# Patient Record
Sex: FEMALE | Race: BLACK OR AFRICAN AMERICAN | NOT HISPANIC OR LATINO | Employment: FULL TIME | URBAN - METROPOLITAN AREA
[De-identification: names, ages, dates, MRNs, and addresses within clinical notes are randomized per-mention and may not be internally consistent; named-entity substitution may affect disease eponyms.]

---

## 2017-01-13 ENCOUNTER — OFFICE VISIT (OUTPATIENT)
Dept: INTERNAL MEDICINE | Facility: CLINIC | Age: 52
End: 2017-01-13
Payer: COMMERCIAL

## 2017-01-13 VITALS
WEIGHT: 181 LBS | OXYGEN SATURATION: 99 % | BODY MASS INDEX: 34.17 KG/M2 | DIASTOLIC BLOOD PRESSURE: 74 MMHG | HEIGHT: 61 IN | SYSTOLIC BLOOD PRESSURE: 144 MMHG | TEMPERATURE: 98 F | HEART RATE: 69 BPM

## 2017-01-13 DIAGNOSIS — E55.9 UNSPECIFIED VITAMIN D DEFICIENCY: ICD-10-CM

## 2017-01-13 DIAGNOSIS — E78.5 HYPERLIPIDEMIA, UNSPECIFIED HYPERLIPIDEMIA TYPE: ICD-10-CM

## 2017-01-13 DIAGNOSIS — E11.9 TYPE 2 DIABETES MELLITUS WITHOUT COMPLICATION, WITHOUT LONG-TERM CURRENT USE OF INSULIN: Primary | Chronic | ICD-10-CM

## 2017-01-13 DIAGNOSIS — I10 ESSENTIAL HYPERTENSION: Chronic | ICD-10-CM

## 2017-01-13 DIAGNOSIS — E66.9 NON MORBID OBESITY, UNSPECIFIED OBESITY TYPE: ICD-10-CM

## 2017-01-13 PROCEDURE — 99214 OFFICE O/P EST MOD 30 MIN: CPT | Mod: S$GLB,,, | Performed by: INTERNAL MEDICINE

## 2017-01-13 PROCEDURE — 4010F ACE/ARB THERAPY RXD/TAKEN: CPT | Mod: S$GLB,,, | Performed by: INTERNAL MEDICINE

## 2017-01-13 PROCEDURE — 2022F DILAT RTA XM EVC RTNOPTHY: CPT | Mod: S$GLB,,, | Performed by: INTERNAL MEDICINE

## 2017-01-13 PROCEDURE — 3078F DIAST BP <80 MM HG: CPT | Mod: S$GLB,,, | Performed by: INTERNAL MEDICINE

## 2017-01-13 PROCEDURE — 3046F HEMOGLOBIN A1C LEVEL >9.0%: CPT | Mod: S$GLB,,, | Performed by: INTERNAL MEDICINE

## 2017-01-13 PROCEDURE — 3074F SYST BP LT 130 MM HG: CPT | Mod: S$GLB,,, | Performed by: INTERNAL MEDICINE

## 2017-01-13 PROCEDURE — 1159F MED LIST DOCD IN RCRD: CPT | Mod: S$GLB,,, | Performed by: INTERNAL MEDICINE

## 2017-01-13 PROCEDURE — 99999 PR PBB SHADOW E&M-EST. PATIENT-LVL III: CPT | Mod: PBBFAC,,, | Performed by: INTERNAL MEDICINE

## 2017-01-13 RX ORDER — METFORMIN HYDROCHLORIDE 500 MG/1
500 TABLET ORAL
Qty: 90 TABLET | Refills: 3 | Status: SHIPPED | OUTPATIENT
Start: 2017-01-13 | End: 2017-10-26 | Stop reason: DRUGHIGH

## 2017-01-13 RX ORDER — LOSARTAN POTASSIUM AND HYDROCHLOROTHIAZIDE 12.5; 1 MG/1; MG/1
1 TABLET ORAL DAILY
Qty: 90 TABLET | Refills: 3 | Status: SHIPPED | OUTPATIENT
Start: 2017-01-13 | End: 2019-02-15 | Stop reason: SDUPTHER

## 2017-01-13 RX ORDER — PRAVASTATIN SODIUM 40 MG/1
40 TABLET ORAL NIGHTLY
Qty: 90 TABLET | Refills: 3 | Status: SHIPPED | OUTPATIENT
Start: 2017-01-13 | End: 2019-02-15 | Stop reason: SDUPTHER

## 2017-01-18 NOTE — PROGRESS NOTES
Subjective:       Patient ID: Sharron Redmond is a 51 y.o. female.    Chief Complaint: Follow-up (4 month DM check up)    HPI   The patient presents for follow-up of hypertension and diabetes therapy.  She has been feeling well.  She relates that her diet is erratic.  Her fasting blood sugars have ranged from 109-115.  She denies experiencing any hypoglycemic episodes.  No lower extremity numbness or tingling is noted.  Her vision is stable.  She has vitamin D deficiency and is using liquid vitamin D3 and a dosage of a drops per day sublingually.  She has hypertension and her blood pressures have ranged from 135-144/84-88.    Review of Systems   Constitutional: Negative for activity change, appetite change and unexpected weight change.   Eyes: Negative for visual disturbance.   Respiratory: Negative for shortness of breath.    Cardiovascular: Negative for chest pain, palpitations and leg swelling.   Gastrointestinal: Negative for abdominal pain, blood in stool and diarrhea.   Genitourinary: Negative for dysuria, frequency, hematuria and urgency.   Neurological: Negative for weakness, numbness and headaches.   Psychiatric/Behavioral: Negative for sleep disturbance.       Objective:      Physical Exam   Constitutional: She is oriented to person, place, and time. She appears well-developed and well-nourished. No distress.   HENT:   Head: Normocephalic and atraumatic.   Eyes: Conjunctivae and EOM are normal. Pupils are equal, round, and reactive to light. No scleral icterus.   Neck: Normal range of motion. Neck supple. No JVD present. No thyromegaly present.   Cardiovascular: Normal rate, regular rhythm, normal heart sounds and intact distal pulses.  Exam reveals no gallop and no friction rub.    No murmur heard.  Pulmonary/Chest: Effort normal and breath sounds normal. No respiratory distress. She has no wheezes. She has no rales.   Abdominal: Soft. Bowel sounds are normal. She exhibits no mass. There is no tenderness.    Musculoskeletal: Normal range of motion. She exhibits no edema or tenderness.   Lymphadenopathy:     She has no cervical adenopathy.   Neurological: She is alert and oriented to person, place, and time. No cranial nerve deficit.   Sensory exam is intact in both feet on monofilament and vibration testing.   Skin: Skin is warm and dry. No rash noted.   No foot lesions are present.   Psychiatric: She has a normal mood and affect. Her behavior is normal.   Nursing note and vitals reviewed.      Results for orders placed or performed in visit on 09/06/16   Comprehensive metabolic panel   Result Value Ref Range    Glucose 119 (H) 65 - 99 mg/dL    BUN, Bld 13 7 - 25 mg/dL    Creatinine 0.75 0.50 - 1.05 mg/dL    eGFR if non  92 > OR = 60 mL/min/1.73m2    eGFR if African American 106 > OR = 60 mL/min/1.73m2    BUN/Creatinine Ratio NOT APPLICABLE 6 - 22 (calc)    Sodium 140 135 - 146 mmol/L    Potassium 4.2 3.5 - 5.3 mmol/L    Chloride 105 98 - 110 mmol/L    CO2 29 20 - 31 mmol/L    Calcium 9.1 8.6 - 10.4 mg/dL    Total Protein 6.6 6.1 - 8.1 g/dL    Albumin 3.7 3.6 - 5.1 g/dL    Globulin, Total 2.9 1.9 - 3.7 g/dL (calc)    Albumin/Globulin Ratio 1.3 1.0 - 2.5 (calc)    Total Bilirubin 0.5 0.2 - 1.2 mg/dL    Alkaline Phosphatase 96 33 - 130 U/L    AST 15 10 - 35 U/L    ALT 13 6 - 29 U/L   Lipid panel   Result Value Ref Range    Cholesterol 217 (H) 125 - 200 mg/dL    HDL 92 > OR = 46 mg/dL    Triglycerides 68 <150 mg/dL    LDL Cholesterol 111 <130 mg/dL (calc)    HDL/Chol Ratio 2.4 < OR = 5.0 (calc)    Non HDL Chol. (LDL+VLDL) 125 mg/dL (calc)   Hemoglobin A1c   Result Value Ref Range    A1c 7.2 (H) <5.7 % of total Hgb       Assessment:       1. Type 2 diabetes mellitus without complication, without long-term current use of insulin    2. Essential hypertension    3. Hyperlipidemia, unspecified hyperlipidemia type    4. Non morbid obesity, unspecified obesity type    5. Unspecified vitamin D deficiency         Plan:           Sharron was seen today for follow-up.  Diet therapy was discussed.  Blood tests and a follow-up visit will be obtained in 4 months.  Current medication will be continued.    Diagnoses and all orders for this visit:    Type 2 diabetes mellitus without complication, without long-term current use of insulin    Essential hypertension    Hyperlipidemia, unspecified hyperlipidemia type    Non morbid obesity, unspecified obesity type    Unspecified vitamin D deficiency  -     Vitamin D; Future    Other orders  -     losartan-hydrochlorothiazide 100-12.5 mg (HYZAAR) 100-12.5 mg Tab; Take 1 tablet by mouth once daily.  -     metformin (GLUCOPHAGE) 500 MG tablet; Take 1 tablet (500 mg total) by mouth before dinner.  -     pravastatin (PRAVACHOL) 40 MG tablet; Take 1 tablet (40 mg total) by mouth nightly. For cholesterol control.

## 2017-05-16 ENCOUNTER — TELEPHONE (OUTPATIENT)
Dept: INTERNAL MEDICINE | Facility: CLINIC | Age: 52
End: 2017-05-16

## 2017-05-16 DIAGNOSIS — E11.9 TYPE 2 DIABETES MELLITUS WITHOUT COMPLICATION, WITHOUT LONG-TERM CURRENT USE OF INSULIN: Primary | Chronic | ICD-10-CM

## 2017-05-16 DIAGNOSIS — E11.9 DIABETES MELLITUS WITHOUT COMPLICATION: Primary | ICD-10-CM

## 2017-05-16 DIAGNOSIS — E78.5 HYPERLIPIDEMIA, UNSPECIFIED HYPERLIPIDEMIA TYPE: ICD-10-CM

## 2017-05-16 DIAGNOSIS — I10 HYPERTENSION, ESSENTIAL: ICD-10-CM

## 2017-05-16 NOTE — TELEPHONE ENCOUNTER
----- Message from Nasreen Fox sent at 5/16/2017  1:21 PM CDT -----  Contact: call pt at [phone]  Doctor appointment and lab have been scheduled.  Please link lab orders to the lab appointment.  Date of doctor appointment:  05/19/2017  Physical or EP:  4 month follow up   Date of lab appointment:  Not scheduled   Comments:patient needs lab orders sent to [facility] in [facility]

## 2017-05-16 NOTE — TELEPHONE ENCOUNTER
----- Message from Nasreen Fox sent at 5/16/2017  1:21 PM CDT -----  Contact: call pt at [phone]  Doctor appointment and lab have been scheduled.  Please link lab orders to the lab appointment.  Date of doctor appointment:  05/19/2017  Physical or EP:  4 month follow up   Date of lab appointment:  Not scheduled   Comments:patient needs lab orders sent to [facility] in [facility]

## 2017-05-18 LAB
ALBUMIN SERPL-MCNC: 3.6 G/DL (ref 3.6–5.1)
ALBUMIN/GLOB SERPL: 1.3 (CALC) (ref 1–2.5)
ALP SERPL-CCNC: 93 U/L (ref 33–130)
ALT SERPL-CCNC: 12 U/L (ref 6–29)
AST SERPL-CCNC: 15 U/L (ref 10–35)
BASOPHILS # BLD AUTO: 18 CELLS/UL (ref 0–200)
BASOPHILS NFR BLD AUTO: 0.4 %
BILIRUB SERPL-MCNC: 0.4 MG/DL (ref 0.2–1.2)
BUN SERPL-MCNC: 11 MG/DL (ref 7–25)
BUN/CREAT SERPL: ABNORMAL (CALC) (ref 6–22)
CALCIUM SERPL-MCNC: 8.8 MG/DL (ref 8.6–10.4)
CHLORIDE SERPL-SCNC: 107 MMOL/L (ref 98–110)
CO2 SERPL-SCNC: 29 MMOL/L (ref 20–31)
CREAT SERPL-MCNC: 0.83 MG/DL (ref 0.5–1.05)
EOSINOPHIL # BLD AUTO: 162 CELLS/UL (ref 15–500)
EOSINOPHIL NFR BLD AUTO: 3.6 %
ERYTHROCYTE [DISTWIDTH] IN BLOOD BY AUTOMATED COUNT: 16.7 % (ref 11–15)
GFR SERPL CREATININE-BSD FRML MDRD: 81 ML/MIN/1.73M2
GLOBULIN SER CALC-MCNC: 2.8 G/DL (CALC) (ref 1.9–3.7)
GLUCOSE SERPL-MCNC: 102 MG/DL (ref 65–99)
HBA1C MFR BLD: 6.5 % OF TOTAL HGB
HCT VFR BLD AUTO: 35.2 % (ref 35–45)
HGB BLD-MCNC: 11.3 G/DL (ref 11.7–15.5)
LYMPHOCYTES # BLD AUTO: 2453 CELLS/UL (ref 850–3900)
LYMPHOCYTES NFR BLD AUTO: 54.5 %
MCH RBC QN AUTO: 26.4 PG (ref 27–33)
MCHC RBC AUTO-ENTMCNC: 32 G/DL (ref 32–36)
MCV RBC AUTO: 82.5 FL (ref 80–100)
MONOCYTES # BLD AUTO: 284 CELLS/UL (ref 200–950)
MONOCYTES NFR BLD AUTO: 6.3 %
NEUTROPHILS # BLD AUTO: 1584 CELLS/UL (ref 1500–7800)
NEUTROPHILS NFR BLD AUTO: 35.2 %
PLATELET # BLD AUTO: 297 THOUSAND/UL (ref 140–400)
PMV BLD REES-ECKER: 8.6 FL (ref 7.5–12.5)
POTASSIUM SERPL-SCNC: 3.9 MMOL/L (ref 3.5–5.3)
PROT SERPL-MCNC: 6.4 G/DL (ref 6.1–8.1)
RBC # BLD AUTO: 4.27 MILLION/UL (ref 3.8–5.1)
SODIUM SERPL-SCNC: 141 MMOL/L (ref 135–146)
WBC # BLD AUTO: 4.5 THOUSAND/UL (ref 3.8–10.8)

## 2017-05-19 ENCOUNTER — OFFICE VISIT (OUTPATIENT)
Dept: INTERNAL MEDICINE | Facility: CLINIC | Age: 52
End: 2017-05-19
Payer: COMMERCIAL

## 2017-05-19 VITALS
WEIGHT: 174.38 LBS | SYSTOLIC BLOOD PRESSURE: 132 MMHG | TEMPERATURE: 99 F | DIASTOLIC BLOOD PRESSURE: 78 MMHG | BODY MASS INDEX: 32.95 KG/M2 | HEART RATE: 60 BPM | RESPIRATION RATE: 16 BRPM

## 2017-05-19 DIAGNOSIS — E78.5 HYPERLIPIDEMIA, UNSPECIFIED HYPERLIPIDEMIA TYPE: ICD-10-CM

## 2017-05-19 DIAGNOSIS — I10 ESSENTIAL HYPERTENSION: Chronic | ICD-10-CM

## 2017-05-19 DIAGNOSIS — E11.9 TYPE 2 DIABETES MELLITUS WITHOUT COMPLICATION, WITHOUT LONG-TERM CURRENT USE OF INSULIN: Primary | Chronic | ICD-10-CM

## 2017-05-19 DIAGNOSIS — E66.9 NON MORBID OBESITY, UNSPECIFIED OBESITY TYPE: ICD-10-CM

## 2017-05-19 PROCEDURE — 99214 OFFICE O/P EST MOD 30 MIN: CPT | Mod: 25,S$GLB,, | Performed by: INTERNAL MEDICINE

## 2017-05-19 PROCEDURE — 3044F HG A1C LEVEL LT 7.0%: CPT | Mod: S$GLB,,, | Performed by: INTERNAL MEDICINE

## 2017-05-19 PROCEDURE — 99999 PR PBB SHADOW E&M-EST. PATIENT-LVL III: CPT | Mod: PBBFAC,,, | Performed by: INTERNAL MEDICINE

## 2017-05-19 PROCEDURE — 4010F ACE/ARB THERAPY RXD/TAKEN: CPT | Mod: S$GLB,,, | Performed by: INTERNAL MEDICINE

## 2017-05-19 NOTE — PROGRESS NOTES
Subjective:       Patient ID: Sharron Redmond is a 51 y.o. female.    Chief Complaint: Follow-up and Diabetes    HPI   Patient presents for follow-up of medical conditions.  She is exercising at least 3 days a week.  Her blood sugars have been stable.  Her vision is good.  She has not experienced any tingling or numbness in the lower extremities.  The patient also has hypertension and hyperlipidemia.  She is tolerating her medications well without experiencing any side effects.  Review of Systems   Constitutional: Negative for activity change, appetite change and unexpected weight change.   Eyes: Negative for visual disturbance.   Respiratory: Negative for shortness of breath.    Cardiovascular: Negative for chest pain, palpitations and leg swelling.   Gastrointestinal: Negative for abdominal pain, blood in stool and diarrhea.   Genitourinary: Negative for dysuria, frequency, hematuria and urgency.   Musculoskeletal: Positive for arthralgias (knee joint pain noted.).   Neurological: Negative for weakness, numbness and headaches.   Psychiatric/Behavioral: Negative for sleep disturbance.       Objective:      Physical Exam   Constitutional: She is oriented to person, place, and time. She appears well-developed and well-nourished. No distress.   HENT:   Head: Normocephalic and atraumatic.   Eyes: Conjunctivae and EOM are normal. Pupils are equal, round, and reactive to light. No scleral icterus.   Neck: Normal range of motion. Neck supple. No JVD present. No thyromegaly present.   Cardiovascular: Normal rate, regular rhythm, normal heart sounds and intact distal pulses.  Exam reveals no gallop and no friction rub.    No murmur heard.  Pulmonary/Chest: Effort normal and breath sounds normal. No respiratory distress. She has no wheezes. She has no rales.   Abdominal: Soft. Bowel sounds are normal. She exhibits no mass. There is no tenderness.   Musculoskeletal: Normal range of motion. She exhibits no edema or tenderness.    Lymphadenopathy:     She has no cervical adenopathy.   Neurological: She is alert and oriented to person, place, and time. No cranial nerve deficit.   Sensory exam is intact in both feet on monofilament and vibration testing.   Skin: Skin is warm and dry. No rash noted.   No foot lesions are present.   Psychiatric: She has a normal mood and affect. Her behavior is normal.   Nursing note and vitals reviewed.      Results for orders placed or performed in visit on 05/16/17   Comprehensive metabolic panel   Result Value Ref Range    Glucose 102 (H) 65 - 99 mg/dL    BUN, Bld 11 7 - 25 mg/dL    Creatinine 0.83 0.50 - 1.05 mg/dL    eGFR if non  81 > OR = 60 mL/min/1.73m2    eGFR if African American 94 > OR = 60 mL/min/1.73m2    BUN/Creatinine Ratio NOT APPLICABLE 6 - 22 (calc)    Sodium 141 135 - 146 mmol/L    Potassium 3.9 3.5 - 5.3 mmol/L    Chloride 107 98 - 110 mmol/L    CO2 29 20 - 31 mmol/L    Calcium 8.8 8.6 - 10.4 mg/dL    Total Protein 6.4 6.1 - 8.1 g/dL    Albumin 3.6 3.6 - 5.1 g/dL    Globulin, Total 2.8 1.9 - 3.7 g/dL (calc)    Albumin/Globulin Ratio 1.3 1.0 - 2.5 (calc)    Total Bilirubin 0.4 0.2 - 1.2 mg/dL    Alkaline Phosphatase 93 33 - 130 U/L    AST 15 10 - 35 U/L    ALT 12 6 - 29 U/L   Hemoglobin A1c   Result Value Ref Range    A1c 6.5 (H) <5.7 % of total Hgb   CBC auto differential   Result Value Ref Range    WBC 4.5 3.8 - 10.8 Thousand/uL    RBC 4.27 3.80 - 5.10 Million/uL    Hemoglobin 11.3 (L) 11.7 - 15.5 g/dL    Hematocrit 35.2 35.0 - 45.0 %    MCV 82.5 80.0 - 100.0 fL    MCH 26.4 (L) 27.0 - 33.0 pg    MCHC 32.0 32.0 - 36.0 g/dL    RDW 16.7 (H) 11.0 - 15.0 %    Platelets 297 140 - 400 Thousand/uL    MPV 8.6 7.5 - 12.5 fL    Neutrophils Absolute 1,584 1,500 - 7,800 cells/uL    Lymph # 2,453 850 - 3,900 cells/uL    Mono # 284 200 - 950 cells/uL    Eos # 162 15 - 500 cells/uL    Baso # 18 0 - 200 cells/uL    Neutrophils Relative 35.2 %    Lymph% 54.5 %    Mono% 6.3 %    Eosinophil%  3.6 %    Basophil% 0.4 %       Assessment:       1. Type 2 diabetes mellitus without complication, without long-term current use of insulin    2. Essential hypertension    3. Hyperlipidemia, unspecified hyperlipidemia type    4. Non morbid obesity, unspecified obesity type        Plan:       Sharron was seen today for follow-up and diabetes.  The patient has been encouraged to lose weight.  Laboratory studies will be repeated in 4 months at [facility] as requested.  The patient is to return to clinic in 4 months.  Current medications will be continued.    Diagnoses and all orders for this visit:    Type 2 diabetes mellitus without complication, without long-term current use of insulin    Essential hypertension    Hyperlipidemia, unspecified hyperlipidemia type    Non morbid obesity, unspecified obesity type

## 2017-10-15 ENCOUNTER — HOSPITAL ENCOUNTER (EMERGENCY)
Facility: HOSPITAL | Age: 52
Discharge: HOME | End: 2017-10-16
Attending: EMERGENCY MEDICINE
Payer: COMMERCIAL

## 2017-10-15 DIAGNOSIS — M79.606 LEG PAIN: ICD-10-CM

## 2017-10-15 DIAGNOSIS — M54.30 SCIATICA, UNSPECIFIED LATERALITY: Primary | ICD-10-CM

## 2017-10-15 PROCEDURE — 99284 EMERGENCY DEPT VISIT MOD MDM: CPT

## 2017-10-16 VITALS
SYSTOLIC BLOOD PRESSURE: 172 MMHG | DIASTOLIC BLOOD PRESSURE: 72 MMHG | BODY MASS INDEX: 33.13 KG/M2 | RESPIRATION RATE: 18 BRPM | OXYGEN SATURATION: 100 % | WEIGHT: 180 LBS | TEMPERATURE: 99 F | HEART RATE: 72 BPM | HEIGHT: 62 IN

## 2017-10-16 RX ORDER — PREDNISONE 20 MG/1
40 TABLET ORAL DAILY
Qty: 10 TABLET | Refills: 0 | Status: SHIPPED | OUTPATIENT
Start: 2017-10-16 | End: 2017-10-21

## 2017-10-16 NOTE — ED TRIAGE NOTES
Pt states tripped and fell one month ago has suffered with mild pain to the right leg, tripped and fell again recently and now the pain in her right leg is worse, reports a nerve pain radiating down her right leg

## 2017-10-16 NOTE — ED PROVIDER NOTES
"Encounter Date: 10/15/2017    SCRIBE #1 NOTE: I, Yannick Jacobochrystal, am scribing for, and in the presence of, María Villarrela NP. Other sections scribed: HPI, ROS.       History     Chief Complaint   Patient presents with    Leg Pain     fall in August 2017; lower back pain and pain radiates down right leg; in past week, worse now     CC: Back Pain, Leg Pain  HPI: This 52 y.o. obese female with Hx of HTN, HLD, DM II, gastric bypass, SUKUMAR BSO presents to the ED c/o R low back pain radiating down her R leg for the past 2-2.5 months. Pt states her pain is severe, intermittent, "achy", and seems to be worse at night. Pt states that she tripped, fell, and hurt her R knee a few weeks prior to onset of the rest of these pains. She reports limited relief with Tylenol. She denies numbness, weakness, leg swelling, fever, chest pain, SOB.      The history is provided by the patient.     Review of patient's allergies indicates:   Allergen Reactions    Metformin      Makes pt. Sleepy can not function     Past Medical History:   Diagnosis Date    Diabetes mellitus, type 2     Hyperlipidemia     Hypertension     Obesity      Past Surgical History:   Procedure Laterality Date    gastric bypass surgery  10/2007    HYSTERECTOMY      TAHBSO for ovarian tumors (benign)     Family History   Problem Relation Age of Onset    Hypertension Mother     Diabetes Father     Hypertension Father     Heart disease Father      CHF    Cancer Sister      kidney CA    Cancer Maternal Grandmother      breast and ovarian     Social History   Substance Use Topics    Smoking status: Never Smoker    Smokeless tobacco: Never Used    Alcohol use No     Review of Systems   Constitutional: Negative for chills and fever.   HENT: Negative for ear pain, rhinorrhea and sore throat.    Eyes: Negative for pain and visual disturbance.   Respiratory: Negative for cough and shortness of breath.    Cardiovascular: Negative for chest pain.   Gastrointestinal: " "Negative for abdominal pain, diarrhea, nausea and vomiting.   Genitourinary: Negative for dysuria and flank pain.   Musculoskeletal: Positive for arthralgias, back pain and myalgias.   Skin: Negative for rash and wound.   Neurological: Negative for weakness, numbness and headaches.       Physical Exam     Initial Vitals [10/15/17 2047]   BP Pulse Resp Temp SpO2   (!) 186/79 75 16 98.6 °F (37 °C) 100 %      MAP       114.67         Physical Exam    Nursing note and vitals reviewed.  Constitutional: She appears well-developed and well-nourished.   HENT:   Head: Normocephalic.   Eyes: Conjunctivae are normal.   Neck: Normal range of motion. Neck supple.   Musculoskeletal: Normal range of motion. She exhibits no edema or tenderness.   Neurological: She is alert and oriented to person, place, and time. She has normal strength and normal reflexes. She displays normal reflexes. No cranial nerve deficit.   Skin: Skin is warm and dry. Capillary refill takes less than 2 seconds.         ED Course   Procedures  Labs Reviewed - No data to display          Medical Decision Making:   Initial Assessment:   52-year-old female presents with right leg pain that she describes as a "dull constant ache"  Differential Diagnosis:   DVT  Radiculopathy  Sciatica  ED Management:  Diagnosis management comments: This is an urgent evaluation of a 52-year-old female that presented to the ER with c/o right leg pain. Pts exam was as above.     Ultrasound were reviewed and discussed with pt and     Based on exam today - I have low suspicion for medical, surgical or other life threatening condition.  The patient most likely has sciatica based on the distribution of her pain and the fact she is tender over the sciatic notch.  Patient is diabetic and states that her sugars are very well controlled.  I have given her a course of prednisone with the instructions to stop taking it if her blood sugars go over 200.  I believe pt is safe for " discharge and outpatient f/u.    Pt, and , verbalizes understanding of d/c instructions and will return for worsening condition.    Case discussed with attending who agrees with assessment and plan.               Scribe Attestation:   Scribe #1: I performed the above scribed service and the documentation accurately describes the services I performed. I attest to the accuracy of the note.    Attending Attestation:           Physician Attestation for Scribe:  Physician Attestation Statement for Scribe #1: I, María Velasquez NP, reviewed documentation, as scribed by Yannick Damian in my presence, and it is both accurate and complete.                 ED Course      Clinical Impression:   The primary encounter diagnosis was Sciatica, unspecified laterality. A diagnosis of Leg pain was also pertinent to this visit.    Disposition:   Disposition: Discharged  Condition: Stable                        María Villarreal NP  10/16/17 0127

## 2017-10-19 LAB
ALBUMIN SERPL-MCNC: 3.9 G/DL (ref 3.6–5.1)
ALBUMIN/GLOB SERPL: 1.2 (CALC) (ref 1–2.5)
ALP SERPL-CCNC: 104 U/L (ref 33–130)
ALT SERPL-CCNC: 11 U/L (ref 6–29)
AST SERPL-CCNC: 14 U/L (ref 10–35)
BILIRUB SERPL-MCNC: 0.5 MG/DL (ref 0.2–1.2)
BUN SERPL-MCNC: 14 MG/DL (ref 7–25)
BUN/CREAT SERPL: ABNORMAL (CALC) (ref 6–22)
CALCIUM SERPL-MCNC: 9.6 MG/DL (ref 8.6–10.4)
CHLORIDE SERPL-SCNC: 103 MMOL/L (ref 98–110)
CHOLEST SERPL-MCNC: 239 MG/DL
CHOLEST/HDLC SERPL: 2.5 (CALC)
CO2 SERPL-SCNC: 28 MMOL/L (ref 20–31)
CREAT SERPL-MCNC: 0.79 MG/DL (ref 0.5–1.05)
GFR SERPL CREATININE-BSD FRML MDRD: 85 ML/MIN/1.73M2
GLOBULIN SER CALC-MCNC: 3.2 G/DL (CALC) (ref 1.9–3.7)
GLUCOSE SERPL-MCNC: 130 MG/DL (ref 65–99)
HBA1C MFR BLD: 7.8 % OF TOTAL HGB
HDLC SERPL-MCNC: 97 MG/DL
LDLC SERPL CALC-MCNC: 120 MG/DL (CALC)
NONHDLC SERPL-MCNC: 142 MG/DL (CALC)
POTASSIUM SERPL-SCNC: 4.1 MMOL/L (ref 3.5–5.3)
PROT SERPL-MCNC: 7.1 G/DL (ref 6.1–8.1)
SODIUM SERPL-SCNC: 142 MMOL/L (ref 135–146)
TRIGL SERPL-MCNC: 111 MG/DL

## 2017-10-26 ENCOUNTER — OFFICE VISIT (OUTPATIENT)
Dept: INTERNAL MEDICINE | Facility: CLINIC | Age: 52
End: 2017-10-26
Payer: COMMERCIAL

## 2017-10-26 ENCOUNTER — TELEPHONE (OUTPATIENT)
Dept: INTERNAL MEDICINE | Facility: CLINIC | Age: 52
End: 2017-10-26

## 2017-10-26 ENCOUNTER — HOSPITAL ENCOUNTER (OUTPATIENT)
Dept: RADIOLOGY | Facility: HOSPITAL | Age: 52
Discharge: HOME | End: 2017-10-26
Attending: INTERNAL MEDICINE
Payer: COMMERCIAL

## 2017-10-26 VITALS
WEIGHT: 190.94 LBS | DIASTOLIC BLOOD PRESSURE: 90 MMHG | TEMPERATURE: 100 F | SYSTOLIC BLOOD PRESSURE: 142 MMHG | HEART RATE: 92 BPM | BODY MASS INDEX: 34.92 KG/M2 | RESPIRATION RATE: 16 BRPM

## 2017-10-26 DIAGNOSIS — S89.91XS KNEE INJURY, RIGHT, SEQUELA: ICD-10-CM

## 2017-10-26 DIAGNOSIS — E78.5 HYPERLIPIDEMIA, UNSPECIFIED HYPERLIPIDEMIA TYPE: ICD-10-CM

## 2017-10-26 DIAGNOSIS — I10 ESSENTIAL HYPERTENSION: Chronic | ICD-10-CM

## 2017-10-26 DIAGNOSIS — E11.9 TYPE 2 DIABETES MELLITUS WITHOUT COMPLICATION, WITHOUT LONG-TERM CURRENT USE OF INSULIN: Chronic | ICD-10-CM

## 2017-10-26 DIAGNOSIS — M54.31 SCIATICA OF RIGHT SIDE: Primary | ICD-10-CM

## 2017-10-26 DIAGNOSIS — M54.31 SCIATICA OF RIGHT SIDE: ICD-10-CM

## 2017-10-26 PROCEDURE — 72100 X-RAY EXAM L-S SPINE 2/3 VWS: CPT | Mod: TC,PO

## 2017-10-26 PROCEDURE — 73562 X-RAY EXAM OF KNEE 3: CPT | Mod: TC,PO,RT

## 2017-10-26 PROCEDURE — 72100 X-RAY EXAM L-S SPINE 2/3 VWS: CPT | Mod: 26,,, | Performed by: RADIOLOGY

## 2017-10-26 PROCEDURE — 99999 PR PBB SHADOW E&M-EST. PATIENT-LVL IV: CPT | Mod: PBBFAC,,, | Performed by: INTERNAL MEDICINE

## 2017-10-26 PROCEDURE — 99214 OFFICE O/P EST MOD 30 MIN: CPT | Mod: S$GLB,,, | Performed by: INTERNAL MEDICINE

## 2017-10-26 PROCEDURE — 73562 X-RAY EXAM OF KNEE 3: CPT | Mod: 26,RT,, | Performed by: RADIOLOGY

## 2017-10-26 RX ORDER — NITROFURANTOIN 25; 75 MG/1; MG/1
100 CAPSULE ORAL 2 TIMES DAILY
Qty: 14 CAPSULE | Refills: 0 | Status: SHIPPED | OUTPATIENT
Start: 2017-10-26 | End: 2017-11-08

## 2017-10-26 RX ORDER — METFORMIN HYDROCHLORIDE 750 MG/1
750 TABLET, EXTENDED RELEASE ORAL
Qty: 30 TABLET | Refills: 11 | Status: SHIPPED | OUTPATIENT
Start: 2017-10-26 | End: 2019-02-15 | Stop reason: SDUPTHER

## 2017-10-26 RX ORDER — METHOCARBAMOL 750 MG/1
750 TABLET, FILM COATED ORAL EVERY 8 HOURS PRN
Qty: 60 TABLET | Refills: 3 | Status: SHIPPED | OUTPATIENT
Start: 2017-10-26 | End: 2017-11-05

## 2017-10-26 RX ORDER — MELOXICAM 15 MG/1
15 TABLET ORAL DAILY
Qty: 30 TABLET | Refills: 2 | Status: SHIPPED | OUTPATIENT
Start: 2017-10-26 | End: 2017-11-25

## 2017-10-26 NOTE — TELEPHONE ENCOUNTER
Pt states that following her OV she noted a slight burning with urination.    She is requesting that medication is sent to her local pharmacy for UTI.    Thanks.

## 2017-10-26 NOTE — PROGRESS NOTES
Subjective:       Patient ID: Sharron Shafer is a 52 y.o. female.    Chief Complaint: Follow-up; Diabetes; Hypertension; and Sciatica    HPI   The patient presents for follow-up of persistent symptoms of right sided sciatica in addition to chronic medical conditions.  The patient states she has experienced several falls related to the onset of her sciatica.  She fell at work in August 2017 hurting her right knee.  She fell again in her living room in September 2017 again hurting the right knee and also initiating symptoms of right lower back pain.  The right lumbar pain radiates into the right buttock, hip, leg and into the right foot.  She denies having any tingling or numbness.  She has had intermittent weakness in the right leg.  Earlier this month on getting out of bed she fell onto her right side hitting her head in the process.  This occurred when her right knee gave way while she was getting out of the patient.  She states that this fall also exacerbated her back pain.  There was no loss of consciousness.  She was evaluated in the emergency room on 10/15/17.  Prednisone was prescribed after she was diagnosed to have sciatica.  A venous ultrasound was negative for evidence of DVT in the right lower extremity.  She has noted some improvement in her lumbar radicular pain since completing 5 days of the prednisone.  She did note a temporary elevation in her blood sugar levels from the steroid medication.    She reports blood sugar levels have been improving since she discontinued prednisone therapy.  Her vision is stable.  No lower extremity paresthesias are noted.  She is tolerating her blood pressure medication well.  She has noted unspecified weight gain over the past 3 months.  She admits to being noncompliant with diet since her marriage to a  in July 2017.  Review of Systems   Constitutional: Negative for activity change, appetite change, fatigue and unexpected weight change.   Eyes: Negative for  visual disturbance.   Respiratory: Negative for shortness of breath.    Cardiovascular: Negative for chest pain, palpitations and leg swelling.   Gastrointestinal: Negative for abdominal pain, blood in stool and diarrhea.   Endocrine: Positive for polydipsia. Negative for polyphagia and polyuria.   Genitourinary: Negative for dysuria, frequency, hematuria and urgency.   Musculoskeletal: Positive for arthralgias, back pain and joint swelling.   Skin: Negative for pallor.   Neurological: Negative for dizziness, tremors, seizures, speech difficulty, weakness, numbness and headaches.   Psychiatric/Behavioral: Negative for confusion and sleep disturbance. The patient is not nervous/anxious.        Objective:      Physical Exam   Constitutional: She is oriented to person, place, and time. She appears well-developed and well-nourished. No distress.   HENT:   Head: Normocephalic and atraumatic.   Eyes: Conjunctivae and EOM are normal. Pupils are equal, round, and reactive to light. No scleral icterus.   Neck: Normal range of motion. Neck supple. No JVD present. No thyromegaly present.   Cardiovascular: Normal rate, regular rhythm, normal heart sounds and intact distal pulses.  Exam reveals no gallop and no friction rub.    No murmur heard.  Pulmonary/Chest: Effort normal and breath sounds normal. No respiratory distress. She has no wheezes. She has no rales.   Abdominal: Soft. Bowel sounds are normal. She exhibits no mass. There is no tenderness.   Musculoskeletal: She exhibits tenderness.   The right knee is tender along the medial and lateral aspects of the joint on palpation.  The knee is slightly swollen.  Tenderness is present on range of motion testing.  Decreased flexion of the knee is noted.  Left knee range of motion is intact.  Hip range of motion is intact although abduction and external rotation of the right hip exacerbates her right lumbar pain.  There is right sciatic notch tenderness and right SI joint  tenderness present on palpation.  Positive straight leg raising test on the right at 60°; negative straight leg raising test on the left.  Anterior flexion of the spine to 90°; decreased posterior extension noted on testing.   Lymphadenopathy:     She has no cervical adenopathy.   Neurological: She is alert and oriented to person, place, and time. No cranial nerve deficit. Coordination normal.   Sensory exam is intact in both feet on monofilament and vibration testing.  A slight limp is noted on the right side during ambulation.  Lower extremity strength is 5/5 in the left lower extremity and 3+/5 in the right lower extremity on testing.   Skin: Skin is warm and dry. No rash noted.   No foot lesions are present.   Psychiatric: She has a normal mood and affect. Her behavior is normal. Thought content normal.   Nursing note and vitals reviewed.      Results for orders placed or performed in visit on 05/19/17   Comprehensive metabolic panel   Result Value Ref Range    Glucose 130 (H) 65 - 99 mg/dL    BUN, Bld 14 7 - 25 mg/dL    Creatinine 0.79 0.50 - 1.05 mg/dL    eGFR if non  85 > OR = 60 mL/min/1.73m2    eGFR if African American 99 > OR = 60 mL/min/1.73m2    BUN/Creatinine Ratio NOT APPLICABLE 6 - 22 (calc)    Sodium 142 135 - 146 mmol/L    Potassium 4.1 3.5 - 5.3 mmol/L    Chloride 103 98 - 110 mmol/L    CO2 28 20 - 31 mmol/L    Calcium 9.6 8.6 - 10.4 mg/dL    Total Protein 7.1 6.1 - 8.1 g/dL    Albumin 3.9 3.6 - 5.1 g/dL    Globulin, Total 3.2 1.9 - 3.7 g/dL (calc)    Albumin/Globulin Ratio 1.2 1.0 - 2.5 (calc)    Total Bilirubin 0.5 0.2 - 1.2 mg/dL    Alkaline Phosphatase 104 33 - 130 U/L    AST 14 10 - 35 U/L    ALT 11 6 - 29 U/L   Lipid panel   Result Value Ref Range    Cholesterol 239 (H) <200 mg/dL    HDL 97 >50 mg/dL    Triglycerides 111 <150 mg/dL    LDL Cholesterol 120 (H) mg/dL (calc)    HDL/Chol Ratio 2.5 <5.0 (calc)    Non HDL Chol. (LDL+VLDL) 142 (H) <130 mg/dL (calc)   Hemoglobin A1c    Result Value Ref Range    A1c 7.8 (H) <5.7 % of total Hgb       Assessment:       1. Sciatica of right side    2. Knee injury, right, sequela    3. Type 2 diabetes mellitus without complication, without long-term current use of insulin    4. Essential hypertension    5. Hyperlipidemia, unspecified hyperlipidemia type        Plan:         Sharron was seen today for follow-up, diabetes, hypertension and sciatica.  The dose of metformin will be changed to extended-release metformin 750 mg once daily.  Meloxicam and Robaxin will be ordered for treatment of back pain.  X-rays of the right knee will be obtained in view of persistent knee swelling and pain since her initial fall.  X-rays of the lumbar spine will be obtained.  An MRI scan of the lumbar spine has also been ordered in view of the history of multiple falls and persistent lumbar radicular symptoms.  Patient will also be referred to [facility] for follow-up evaluation for additional treatment.  Patient will see me again in one month.    Diagnoses and all orders for this visit:    Sciatica of right side  -     X-Ray Lumbar Spine Ap And Lateral; Future  -     MRI Lumbar Spine Without Contrast; Future  -     Ambulatory Consult to [facility]    Knee injury, right, sequela  -     X-Ray Knee 3 View Right; Future    Type 2 diabetes mellitus without complication, without long-term current use of insulin    Essential hypertension    Hyperlipidemia, unspecified hyperlipidemia type    Other orders  -     meloxicam (MOBIC) 15 MG tablet; Take 1 tablet (15 mg total) by mouth once daily.  -     methocarbamol (ROBAXIN) 750 MG Tab; Take 1 tablet (750 mg total) by mouth every 8 (eight) hours as needed (muscle spasm and pain).  -     metFORMIN (GLUCOPHAGE-XR) 750 MG 24 hr tablet; Take 1 tablet (750 mg total) by mouth daily with breakfast.

## 2017-10-27 NOTE — TELEPHONE ENCOUNTER
A prescription for Macrobid has been sent to the patient's pharmacy. (Routing comment)     Noted that MD sent in the above rx.

## 2017-10-31 ENCOUNTER — HOSPITAL ENCOUNTER (OUTPATIENT)
Dept: RADIOLOGY | Facility: HOSPITAL | Age: 52
Discharge: HOME | End: 2017-10-31
Attending: INTERNAL MEDICINE
Payer: COMMERCIAL

## 2017-10-31 DIAGNOSIS — M54.31 SCIATICA OF RIGHT SIDE: ICD-10-CM

## 2017-10-31 PROCEDURE — 72148 MRI LUMBAR SPINE W/O DYE: CPT | Mod: TC

## 2017-10-31 PROCEDURE — 72148 MRI LUMBAR SPINE W/O DYE: CPT | Mod: 26,,, | Performed by: RADIOLOGY

## 2017-11-01 ENCOUNTER — TELEPHONE (OUTPATIENT)
Dept: SPINE | Facility: CLINIC | Age: 52
End: 2017-11-01

## 2017-11-01 DIAGNOSIS — M54.50 LOW BACK PAIN WITHOUT SCIATICA, UNSPECIFIED BACK PAIN LATERALITY, UNSPECIFIED CHRONICITY: Primary | ICD-10-CM

## 2017-11-08 ENCOUNTER — HOSPITAL ENCOUNTER (OUTPATIENT)
Dept: RADIOLOGY | Facility: OTHER | Age: 52
Discharge: HOME | End: 2017-11-08
Attending: PHYSICIAN ASSISTANT
Payer: COMMERCIAL

## 2017-11-08 ENCOUNTER — OFFICE VISIT (OUTPATIENT)
Dept: SPINE | Facility: CLINIC | Age: 52
End: 2017-11-08
Payer: COMMERCIAL

## 2017-11-08 VITALS
HEIGHT: 62 IN | DIASTOLIC BLOOD PRESSURE: 70 MMHG | WEIGHT: 190 LBS | BODY MASS INDEX: 34.96 KG/M2 | HEART RATE: 71 BPM | SYSTOLIC BLOOD PRESSURE: 142 MMHG

## 2017-11-08 DIAGNOSIS — M43.10 ACQUIRED SPONDYLOLISTHESIS: ICD-10-CM

## 2017-11-08 DIAGNOSIS — M54.17 THORACIC AND LUMBOSACRAL NEURITIS: ICD-10-CM

## 2017-11-08 DIAGNOSIS — M47.819 SPONDYLOSIS WITHOUT MYELOPATHY: ICD-10-CM

## 2017-11-08 DIAGNOSIS — M54.41 ACUTE RIGHT-SIDED LOW BACK PAIN WITH RIGHT-SIDED SCIATICA: ICD-10-CM

## 2017-11-08 DIAGNOSIS — M54.50 LOW BACK PAIN WITHOUT SCIATICA, UNSPECIFIED BACK PAIN LATERALITY, UNSPECIFIED CHRONICITY: ICD-10-CM

## 2017-11-08 DIAGNOSIS — M54.14 THORACIC AND LUMBOSACRAL NEURITIS: ICD-10-CM

## 2017-11-08 DIAGNOSIS — M51.37 DDD (DEGENERATIVE DISC DISEASE), LUMBOSACRAL: ICD-10-CM

## 2017-11-08 PROCEDURE — 72100 X-RAY EXAM L-S SPINE 2/3 VWS: CPT | Mod: TC

## 2017-11-08 PROCEDURE — 99204 OFFICE O/P NEW MOD 45 MIN: CPT | Mod: S$GLB,,, | Performed by: PHYSICIAN ASSISTANT

## 2017-11-08 PROCEDURE — 72100 X-RAY EXAM L-S SPINE 2/3 VWS: CPT | Mod: 26,,, | Performed by: RADIOLOGY

## 2017-11-08 PROCEDURE — 99999 PR PBB SHADOW E&M-EST. PATIENT-LVL IV: CPT | Mod: PBBFAC,,, | Performed by: PHYSICIAN ASSISTANT

## 2017-11-08 RX ORDER — GABAPENTIN 300 MG/1
CAPSULE ORAL
Qty: 90 CAPSULE | Refills: 2 | Status: SHIPPED | OUTPATIENT
Start: 2017-11-08 | End: 2021-10-19

## 2017-11-08 NOTE — LETTER
November 8, 2017      Eric Najera MD  [address] [facility] [address]           [facility]  [address], [address]  [facility] [address]  Phone: [phone]  Fax: [phone]          Patient: Sharron Shafer   MR Number: [id number]   YOB: 1965   Date of Visit: 11/8/2017       Dear Dr. Eric Najera:    Thank you for referring Sharron Shafer to me for evaluation. Attached you will find relevant portions of my assessment and plan of care.    If you have questions, please do not hesitate to call me. I look forward to following Sharron Shafer along with you.    Sincerely,    Najma May PA-C    Enclosure  CC:  No Recipients    If you would like to receive this communication electronically, please contact [email or web address] or [phone] to request more information on [employer] Link access.    For providers and/or their staff who would like to refer a patient to [facility], please contact us through our one-stop-shop provider referral line, [facility], at [phone].    If you feel you have received this communication in error or would no longer like to receive these types of communications, please e-mail [email or web address]

## 2017-11-08 NOTE — PROGRESS NOTES
Subjective:     Patient ID:  Sharron Shafer is a 52 y.o. female.    Patient referred by Dr. Najera    Chief Complaint:  Acute right buttock and right leg pain    HPI    Sharron Shafer is a 52 y.o. female who presents with the above CC.  Patient fell on her right side at work in September and she started to have right knee pain at that time.  She tripped and fell in her house again on her right side and started to have right leg pain after that.  Pain starts in the right buttock and radiates down the right posterior leg to the ankle and numbness and tingling in her right toes. Right leg pain worse with sitting or laying or putting weight on her right side or with with walking and better with taking pressure off that side.  No left leg pain.  Buttock pain rated 5/10 and right leg pain rated 7/10.  She feels some weakness in her right leg.    Patient has not had PT or ESIs.  No spine surgery.  Patient is currently taking Mobic which helps some.  She had already tried the medrol pack which helped.    Patient denies any recent accidents or trauma, no saddle anesthesias, and no bowel or bladder incontinence.      Review of Systems:  Please refer to page three of the spine center intake form for a complete review of systems.    Past Medical History:   Diagnosis Date    Diabetes mellitus, type 2     Hyperlipidemia     Hypertension     Obesity      Past Surgical History:   Procedure Laterality Date    gastric bypass surgery  10/2007    HYSTERECTOMY      TAHBSO for ovarian tumors (benign)     Current Outpatient Prescriptions on File Prior to Visit   Medication Sig Dispense Refill    cyanocobalamin (VITAMIN B-12) 1000 MCG tablet Take one capsule weekly 30 tablet 11    estradiol (ESTRACE) 1 MG tablet       losartan-hydrochlorothiazide 100-12.5 mg (HYZAAR) 100-12.5 mg Tab Take 1 tablet by mouth once daily. 90 tablet 3    magnesium oxide-Mg AA chelate (MG-PLUS-PROTEIN) 133 mg Tab Take by mouth.      meloxicam  "(MOBIC) 15 MG tablet Take 1 tablet (15 mg total) by mouth once daily. 30 tablet 2    metFORMIN (GLUCOPHAGE-XR) 750 MG 24 hr tablet Take 1 tablet (750 mg total) by mouth daily with breakfast. 30 tablet 11    multivitamin capsule Take 1 capsule by mouth once daily.      pravastatin (PRAVACHOL) 40 MG tablet Take 1 tablet (40 mg total) by mouth nightly. For cholesterol control. 90 tablet 3    [DISCONTINUED] nitrofurantoin, macrocrystal-monohydrate, (MACROBID) 100 MG capsule Take 1 capsule (100 mg total) by mouth 2 (two) times daily. For urinary infection. 14 capsule 0     No current facility-administered medications on file prior to visit.      Review of patient's allergies indicates:   Allergen Reactions    Metformin      Makes pt. 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[employer] [facility] [employer]     Social History Main Topics    Smoking status: Never Smoker    Smokeless tobacco: Never Used    Alcohol use No    Drug use: No    Sexual activity: Not on file     Other Topics Concern    Not on file     Social History Narrative    No narrative on file     Family History   Problem Relation Age of Onset    Hypertension Mother     Diabetes Father     Hypertension Father     Heart disease Father      CHF    Cancer Sister      kidney CA    Cancer Maternal Grandmother      breast and ovarian       Objective:      Vitals:    11/08/17 0847   BP: (!) 142/70   Pulse: 71   Weight: 86.2 kg (190 lb)   Height: 5' 2" (1.575 m)   PainSc:   7   PainLoc: Leg         Physical Exam:    General:  Sharron Shafer is well-developed, well-nourished, appears stated age, in no acute distress, alert and oriented to person, place, and time.    Pulmonary/Chest:  Respiratory effort normal  Abdominal: Exhibits no distension  Psychiatric:  Normal mood and affect.  " Behavior is normal.  Judgement and thought content normal    Musculoskeletal:    Patient arises from a sitting to standing position without difficulty.  Patient walks to the door without evidence of limp, pain, or abnormality of gait. Patient is able to walk on heels without difficulty.  Patient cannot walk on her toes on the right side.    Lumbar ROM:   No pain in lumbar flexion, extension, right lateral bending, and left lateral bending.    Lumbar Spine Inspection:  Normal with no surgical scars and no visible rashes.    Lumbar Spine Palpation:  Mild tenderness to low back palpation.    SI Joint Palpation:  No tenderness to left SI Joint palpation.  Mild tenderness to right SI joint palpation.    Straight Leg Raise:  Negative right and left SLR.    Neurological: Alert and oriented to person, place, and time    Muscle strength against resistance:     Right Left   Hip flexion  5 / 5 5 / 5   Hip extension 5 / 5 5 / 5   Hip abduction 5 / 5 5 / 5   Hip adduction  5 / 5 5 / 5   Knee extension  5 / 5 5 / 5   Knee flexion 4 / 5 5 / 5   Dorsiflexion  4+ / 5 5 / 5   EHL  5 / 5 5 / 5   Plantar flexion  5 / 5 5 / 5   Inversion of the feet 5 / 5 5 / 5   Eversion of the feet  5 / 5 5 / 5     Reflexes:     Right Left   Patellar 1+ 2+   Achilles 2+ 2+     Clonus:  Negative bilaterally    On gross examination of the bilateral upper extremities, patient has full painfree ROM with no signs of clubbing, cyanosis, edema, or weakness.     XRAY/MRI Interpretation:     Lumbar spine ap/lateral/flexion/extension xrays were personally reviewed today.  No fractures.  No movement on flexion and extension.  Grade one spondylolisthesis at L4-5.    Lumbar spine MRI was personally reviewed today.  Mild DDD at L4-5 and L3-4.  Grade one spondylolisthesis at L4-5.  Mild bilateral recess stenosis at L5-S1.  No HNP.      Assessment:          1. Spondylosis without myelopathy    2. DDD (degenerative disc disease), lumbosacral    3. Thoracic and  lumbosacral neuritis    4. Acquired spondylolisthesis    5. Acute right-sided low back pain with right-sided sciatica            Plan:          Orders Placed This Encounter    Ambulatory referral to Physical Therapy - Lumbar    gabapentin (NEURONTIN) 300 MG capsule       Grade one spondylolisthesis at L4-5 and bilateral lateral recess stenosis at L5-S1.  Right lateral recess stenosis at L5-S1 most likely her pain generator    -Continue Mobic from another provider  -Neurontin now  -Start PT through [facility]  -FU with Katharine Ni PA-C in three months while I am out on leave  -Consider right L5 and right S1 TESI in the future if symptoms persist    Follow-Up:  Return in 3 months (on 2/8/2018). If there are any questions prior to this, the patient was instructed to contact the office.       CIARA Freeman, MEME  Neurosurgery  [facility]  [facility]

## 2017-11-17 ENCOUNTER — CLINICAL SUPPORT (OUTPATIENT)
Dept: REHABILITATION | Facility: HOSPITAL | Age: 52
End: 2017-11-17
Payer: COMMERCIAL

## 2017-11-17 DIAGNOSIS — R53.1 DECREASED STRENGTH: ICD-10-CM

## 2017-11-17 DIAGNOSIS — M62.89 MUSCLE TIGHTNESS: ICD-10-CM

## 2017-11-17 DIAGNOSIS — M54.16 LUMBAR BACK PAIN WITH RADICULOPATHY AFFECTING RIGHT LOWER EXTREMITY: ICD-10-CM

## 2017-11-17 PROCEDURE — 97140 MANUAL THERAPY 1/> REGIONS: CPT | Mod: PN

## 2017-11-17 PROCEDURE — 97162 PT EVAL MOD COMPLEX 30 MIN: CPT | Mod: PN

## 2017-11-17 PROCEDURE — 97110 THERAPEUTIC EXERCISES: CPT | Mod: PN

## 2017-11-17 NOTE — PLAN OF CARE
"  TIME RECORD    Date: 11/17/2017    Start Time:  3:00  Stop Time:  4:00  Total Timed Minutes:  60 min      OUTPATIENT PHYSICAL THERAPY   PATIENT EVALUATION  Onset Date: 09/2017  Primary Diagnosis:   1. Lumbar back pain with radiculopathy affecting right lower extremity     2. Decreased strength     3. Muscle tightness       Treatment Diagnosis: right low back pain with radiculitis, R knee pain, decreased ROM, flexibility, strength, difficulty walking  Past Medical History:   Diagnosis Date    Diabetes mellitus, type 2     Hyperlipidemia     Hypertension     Obesity      Precautions: DDD, spondylosis, acquired spondylolisthesis. DM Type II, Obesity (BMI 34.75 kg/m2). Fall risk. significant knee OA  Prior Therapy: none for c/c  Medications: Sharron Shafer has a current medication list which includes the following prescription(s): cyanocobalamin, estradiol, gabapentin, losartan-hydrochlorothiazide 100-12.5 mg, magnesium oxide-mg aa chelate, meloxicam, metformin, multivitamin, and pravastatin.    History of Present Illness: sudden onset following falls x2. Pt reports gradual improvement in sciatic pain since taking medication.  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 Recreation - walking.  Place of Residence (Steps/Adaptations): [facility] with . 3 stairs to front door of work office (2 story building, but she takes the elevator)  Functional Deficits Leading to Referral/Nature of Injury: pain and difficulty with ADLs, HHCs, work-related activities, driving  Patient Therapy Goals: "Be able to walk without pain, be able to climb stairs and sit normally without pain"    Subjective     Sharron Shafer states that she has been having right-sided pain since a fall in September 2017 while she was at work, where she landed on her right side and hurt the R knee. "My knee buckled underneath me." She reports that she fell a 2nd time shortly after that at " "home, again landing on the right side, which caused onset of R sided glute and leg pain. "It feels like sciatic nerve pain." Pt reports numbness and tingling from posterior leg to ankle and into toes. "It feels like my knee will lock up and then I trip over my own foot." reports that she has been having crepitus, tightness, and swelling in the right knee. Pt reports pain with prolonged standing, sitting. She must unweight her right glute while sitting to reduce her pain. Pain medication has allowed her to be able to sit longer before the pain returns. She was told taht the PA todl her that she has spine narrowing (mild to moderate) and that this is normal wear and tear for people her age. Hx of fall down stairs at age of 21, but no falls since then (except at onset). Denies saddle paresthesias, B/B changes since onset.    Pain:  Location: R low back, glute, LE/knee  Description: Variable and pressure, tension, sharp/shooting pain, Numbness/tingling  Activities Which Increase Pain: sitting (UA to sit upright), walking/standing <20 min, lying down on R side, weightbearing through RLE. Work-activities, driving (20-30 min), HHCs (mopping), lifting, squatting, carrying, stair climbing  Activities Which Decrease Pain: unweighting RLE, pain medication, rest, lying supine  Pain Scale: 5/10 at best 5/10 now  9/10 at worst    Objective       Palpation: significant TTP to R glutes, piriformis, TFL  Postural examination/scapula alignment: Rounded shoulder, Posterior pelvic tilt, Slouched posture, Trunk deviated left, Decreased lordosis and unweighting of R low back and hip during sitting  Sensory deficit: grossly intact  Reflexes:    Left Right   Patella Tendon 2+ 2+   Achilles Tendon 2+ 2+   Babinski  (--) (--)   Clonus (--) (--)       Thoracic/Lumbar AROM: Pain/Dysfunction with Movement:   Flexion Mod-Max limited, fingertips 3" below patellar apex, c/o pulling pain in R LB and hip   Extension Mod limited, 10 deg ext from LSP " "with mild increase in lumbar lordosis, no extension from hips   Right side bending Mod-Max howard, 2" above knee joint line, c/o pain   Left side bending Mod-max howard, 2" above knee joint line, increased R LB pain (worse than RSB)   Right rotation Standing 50 deg, sitting 50 deg, c/o pain   Left rotation Standing 75 deg, sitting 75 deg, c/o pain in R LB (worse than RR)      Hip ROM: extension = lacks 5, flexion = 100, IR = 10, ER = 40  Knee ROM: flex = 90 w/ crepitus/pain/guarding, ext = lacks 5    Lower Extremity Strength  Right LE  Left LE    Hip flexion: 3/5 Hip flexion: 3+/5   Hip extension:  3/5 Hip extension: 3+/5   Hip abduction: 4/5 Hip abduction: 4/5   Hip adduction:  5/5 Hip adduction:  5/5   Knee Flexion 5/5 Knee Flexion 5/5   Knee Extension 5/5 Knee Extension 5/5   Ankle dorsiflexion: 5/5 Ankle dorsiflexion: 5/5   Ankle plantarflexion: 5/5  Able but with pain Ankle plantarflexion: 5/5  Able but with pain   Trunk (Abs) poor Trunk (Abs) poor     Flexibility:   Ariel test   Hip flexors: poor   Quads: poor  Anita test   ITB: fair  Hamstring (SLR): poor-fair    Joint Mobility: NT 2* time constrints    Special Tests:   Test Name  Test Result   Prone Instability Test NT    Lumbar Quadrant test (+)   Straight Leg Raise (--)   Neural Tension Test (Slump) (--)   Crossed Straight Leg Raise (--)   Walking on toes (--)   Walking on heels  (--)   Clonus (--)   ROMEO (+)   FADIR (+)   SI Joint Provocation Test (compression / distraction) NT     Balance: SLS <10" before LOB, increased trunk lean to ipsilateral side  Gait: Without AD  Analysis: Assistance Independent, antalgic with increased trunk lean away from RLE, decreased WB'ing through RLE, decreased step length with RLE  Bed Mobility:Independent but with pain  Transfers: Assistance - BUE assist. painful.    Other: FAQ = 36%, Mod Oswestry for LBP = 23% disability, FOTO limitation = 44% disability  Examination time: 35 min    Treatment: LTR x 10, figure 4 piriformis " "stretch: 4 x 10" holds (used towel assist)  Manual therapy: 15 min x STM/MFR glutes, piriformis inhibition, manual hip IR/Er stretch  Patient education: Patient educated regarding pathogenesis, diagnosis, protocol, prognosis, POC, and HEP. Written Home Exercises Provided with written and verbal instructions for frequency and duration of the following exercises: LTR, figure 4 piriformis stretch. Pt educated on HEP and activity modifications to reduce c/o pain and improve overall function. Pt was educated in posture and body mechanics.  Consider recommending use of a lumbar roll next visit, with purchase information to be provided. Pt also educated on use of modalities prn to reduce c/o pain and dysfunction. Patient demo good understanding of the education provided. Patient demo good return demo of skill of exercises.        Assessment     Initial Assessment (Pertinent finding, problem list and factors affecting outcome): Patient presents with right low back pain with radiculitis down to the R lower leg, L5 and S1 dermatomal patterns, as well as R knee pain due to significant OA. Right glute pain presents with s/s associated with piriformis syndrome. Current impairments, including decreased ROM, flexibility, strength, and MF mobility, limits patient with all functional activities, particularly with ambulation. Patient requires skilled PT to address remaining deficits and return patient to OF. Pt has set realistic goals and has verbalized good understanding and agreement with reported diagnosis, prognosis and treatment. Pt demonstrates no additional cultural, spiritual or educational need and currently has no barriers to learning.     Rehab Potiential: good     Medical necessity is demonstrated by the following problem list.  Pt presents with the following impairments:     History  Co-morbidities and personal factors that may impact the plan of care Examination  Body Structures and Functions, activity limitations and " participation restrictions that may impact the plan of care Clinical Presentation   Decision Making/ Complexity Score   Co-morbidities:   DDD, spondylosis, acquired spondylolisthesis. DM Type II, Obesity (BMI 34.75 kg/m2). Fall risk. Significant knee OA              Personal Factors:    Body Regions: R back, LE, knee    Body Systems: Musculoskeletal (ROM, strength, symmetry, joint mobility, soft tissue or myofascial mobility, flexibility), Neuromuscular (postural alignment, body mechanics, balance, gait, transfers, motor control, motor learning), cardiovascular (endurance)    Activity limitations:   Learning and applying knowledge  no deficits    General Tasks and Commands  no deficits    Communication  no deficits    Mobility  lifting and carrying objects  walking  driving (bike, car, motorcycle)    Self care  washing oneself (bathing, drying, washing hands)  caring for body parts (brushing teeth, shaving, grooming)  toileting  dressing  looking after one's health    Domestic Life  shopping  cooking  doing house work (cleaning house, washing dishes, laundry)  assisting others    Interactions/Relationships  no deficits    Life Areas  no deficits    Community and Social Life  community life  recreation and leisure  Sikh and spirituality  human rights  political life and citizenship      Participation Restrictions:   sitting (UA to sit upright), walking/standing <20 min, lying down on R side, weightbearing through RLE. Work-activities, driving (20-30 min), HHCs (mopping), lifting, squatting, carrying, stair climbing         Evolving clinical presentation with changing clinical characteristics         Moderate          FAQ = 36%, Mod Oswestry for LBP = 23% disability, FOTO limitation = 44% disability     Short Term Goals (5 Weeks):  1. Pt able to stand >=30 minutes with ADLs with <4/10 pain.  2. Pt able to walk >=30 minutes with household chores with <4/10 pain.  3. Pt able to sit >=30 minutes with CPU work with  <4/10 pain.  4. Pt to demonstrate improved postural awareness and is able to self correct with min cuing.  5. Pt to demonstrate improved functional ability with FOTO limitation <=34% disability.    Long Term Goals (10 Weeks):  1. Pt able to stand >=1 hour with with ADLs with <3/10 pain.  2. Pt able to walk >=1 hour with household chores with <3/10 pain.  3.  Pt able to squat/lift 5# from floor to waist with <3/10 pain.  4. Pt able to sit >= 1 hour with CPU work with <3/10 pain.  5. Pt is independent with all bed mobility.  6. Pt will be independent with HEP and self management of symptoms.   7. Pt to demonstrate improved functional ability with FOTO limitation <=24% disability.      Plan     Certification Period: 11/17/17 to 2/16/18  Recommended Treatment Plan: 1-2 times per week for 10 weeks: Cervical/Lumbar Traction, Electrical Stimulation prn, Iontophoresis (with dexamethasone prn), Manual Therapy, Moist Heat/ Ice, Neuromuscular Re-ed, Patient Education, Self Care, Therapeutic Activites, Therapeutic Exercise and Other IASTYM, therapeutic taping, dry needling, aquatic therapy  Other Recommendations: Progress HEP towards D/C. Recommend F/U with MD if symptoms worsen or do not resolve. Patient may be seen by a PTA for treatment to carry out their plan of care.  Face-to-face conferences will be held.          Therapist: Мария Waddell, PT    I CERTIFY THE NEED FOR THESE SERVICES FURNISHED UNDER THIS PLAN OF TREATMENT AND WHILE UNDER MY CARE    Physician's comments: ________________________________________________________________________________________________________________________________________________      Physician's Name: ___________________________________

## 2017-12-04 ENCOUNTER — CLINICAL SUPPORT (OUTPATIENT)
Dept: REHABILITATION | Facility: HOSPITAL | Age: 52
End: 2017-12-04
Payer: COMMERCIAL

## 2017-12-04 DIAGNOSIS — M62.89 MUSCLE TIGHTNESS: ICD-10-CM

## 2017-12-04 DIAGNOSIS — R53.1 DECREASED STRENGTH: ICD-10-CM

## 2017-12-04 DIAGNOSIS — M54.16 LUMBAR BACK PAIN WITH RADICULOPATHY AFFECTING RIGHT LOWER EXTREMITY: ICD-10-CM

## 2017-12-04 PROCEDURE — 97140 MANUAL THERAPY 1/> REGIONS: CPT | Mod: PN

## 2017-12-04 PROCEDURE — 97110 THERAPEUTIC EXERCISES: CPT | Mod: PN

## 2017-12-04 NOTE — PROGRESS NOTES
"                                                    Physical Therapy Daily Note     Name: Sharron Shafer  Clinic Number: [id number]  Diagnosis:   Encounter Diagnoses   Name Primary?    Lumbar back pain with radiculopathy affecting right lower extremity     Decreased strength     Muscle tightness      Physician: Najma May, *  Precautions: acquired spondylolisthesis. DM Type II, Obesity (BMI 34.75 kg/m2). Fall risk. significant knee OA  Visit #: 2 of 20  PTA Visit #: 0  Time In: 2:00  Time Out: 3:00  Total treatment time: 60 min (1:1 30 min)    Subjective     Pt reports: that she felt very good after the initial visit, but reports increasing pain over the past few days. Pt reports some improvement with her some stretches.  Pain Scale: Sharron rates pain on a scale of 0-10 to be 6 currently.    Objective     Sharron received individual therapeutic exercises to develop strength, endurance, ROM, flexibility, posture and core stabilization for 40 minutes including:    LTR: 2 min  DKTC: 2 min  HS stretch w/ strap: 5 x 10"  ITB stretch w/ strap: 5 x 10"  Supine hip flexor stretch: add next visit  SLR: 1 x 15  HL Hip adduction 2 min  HL hip abduction: 2 min x RTB  Bridging: next visit    Sharron received the following manual therapy techniques: Manual lumbar traction were applied to the: LSP for 10 minutes     The patient received the following unsupervised modalities after being cleared for contradictions: 10 min x MHP    Written Home Exercises Provided: reviewed from IE.   Pt demo good understanding of the education provided. Sharron demonstrated good return demonstration of activities.     Education provided re:  Sharron verbalized good understanding of education provided.   No spiritual or educational barriers to learning provided    Assessment     Patient tolerated session fairly well today. Pt reported progressing knee pain towards the end of the session. Pt reports reduction of LBP following manual " lumbar traction.  This is a 52 y.o. female referred to outpatient physical therapy and presents with a medical diagnosis of back, hip, and knee pain and demonstrates limitations as described in the problem list. Pt prognosis is Good. Pt will continue to benefit from skilled outpatient physical therapy to address the deficits listed in the problem list, provide pt/family education and to maximize pt's level of independence in the home and community environment.     Goals as follows: See IE on 11/17/17 (PN due by 12/17/17)     Plan     Continue with established Plan of Care towards PT goals.    Therapist: Мария Waddell, PT  12/4/2017

## 2017-12-06 ENCOUNTER — CLINICAL SUPPORT (OUTPATIENT)
Dept: REHABILITATION | Facility: HOSPITAL | Age: 52
End: 2017-12-06
Payer: COMMERCIAL

## 2017-12-06 DIAGNOSIS — M62.89 MUSCLE TIGHTNESS: ICD-10-CM

## 2017-12-06 DIAGNOSIS — M54.16 LUMBAR BACK PAIN WITH RADICULOPATHY AFFECTING RIGHT LOWER EXTREMITY: ICD-10-CM

## 2017-12-06 DIAGNOSIS — R53.1 DECREASED STRENGTH: ICD-10-CM

## 2017-12-06 PROCEDURE — 97110 THERAPEUTIC EXERCISES: CPT | Mod: PN

## 2017-12-06 NOTE — PROGRESS NOTES
"                                                    Physical Therapy Daily Note     Name: Sharron Shafer  Clinic Number: [id number]  Diagnosis:   Encounter Diagnoses   Name Primary?    Lumbar back pain with radiculopathy affecting right lower extremity     Decreased strength     Muscle tightness      Physician: Najma May, *  Precautions: acquired spondylolisthesis. DM Type II, Obesity (BMI 34.75 kg/m2). Fall risk. significant knee OA  Visit #: 3 of 20  PTA Visit #: 1  Time In: 2:30  Time Out: 3:30  Total treatment time: 50 min (1:1 with PTA for 40 min)    Subjective     Pt reports: increased RLE pain that goes from her back to her Right ankle. Patient reports some compliance with her HEP  Pain Scale: Sharron rates pain on a scale of 0-10 to be 5 currently.    Objective     Sharron received individual therapeutic exercises to develop strength, endurance, ROM, flexibility, posture and core stabilization for 50 minutes including:     Exercises  12/4/17 12/6/17    LTR  2 minutes  2 minutes    DKTC on SB  2 minutes  2 minutes    HS stretch c strap  5 x 10" hold ea  5 x 10" hold ea    ITB stretch c strap  5 x 10" hold ea  5 x 10" hold ea    Supine hip flexor stretch  ---  4 x 20" hold ea    SLR  1 x 15 ea  2 x 10 ea    HL Hip Add ball squeeze  2 minutes  2 minutes     HL Hip Abduction  RTB x 2 minutes  RTB x 2 minutes    Bridges  ---  NP 2* to pain     Sharron received the following manual therapy techniques to the lumbar spine for 10 minutes   Right piriformis release   STM to Right piriformis and lumbar spine    The patient received the following unsupervised modalities after being cleared for contradictions: 10 min x MHP to lumbar spine post treatment.    Written Home Exercises Provided: reviewed from IE.   Pt demo good understanding of the education provided. Sharron demonstrated good return demonstration of activities.     Education provided re:  Sharron verbalized good understanding of education " provided.   No spiritual or educational barriers to learning provided    Assessment     Sharron tolerated treatment session well today. Good tolerance to therapeutic exercises. Patient with good response to manual care reporting centralization of radicular symptoms. Patient unable to perform bridges secondary to increased Right ankle and knee pain.   This is a 52 y.o. female referred to outpatient physical therapy and presents with a medical diagnosis of back, hip, and knee pain and demonstrates limitations as described in the problem list. Pt prognosis is Good. Pt will continue to benefit from skilled outpatient physical therapy to address the deficits listed in the problem list, provide pt/family education and to maximize pt's level of independence in the home and community environment.     Goals as follows: See IE on 11/17/17 (PN due by 12/17/17)     Plan     Continue with established Plan of Care towards PT goals.    Therapist: Tootie Becerra, PTA  12/6/2017

## 2017-12-11 ENCOUNTER — CLINICAL SUPPORT (OUTPATIENT)
Dept: REHABILITATION | Facility: HOSPITAL | Age: 52
End: 2017-12-11
Payer: COMMERCIAL

## 2017-12-11 DIAGNOSIS — M54.16 LUMBAR BACK PAIN WITH RADICULOPATHY AFFECTING RIGHT LOWER EXTREMITY: ICD-10-CM

## 2017-12-11 DIAGNOSIS — M62.89 MUSCLE TIGHTNESS: ICD-10-CM

## 2017-12-11 DIAGNOSIS — R53.1 DECREASED STRENGTH: ICD-10-CM

## 2017-12-11 PROCEDURE — 97140 MANUAL THERAPY 1/> REGIONS: CPT | Mod: PN

## 2017-12-11 PROCEDURE — 97110 THERAPEUTIC EXERCISES: CPT | Mod: PN

## 2017-12-11 NOTE — PROGRESS NOTES
"                                                    Physical Therapy Daily Note     Name: Sharron Shafer  Clinic Number: [id number]  Diagnosis:   Encounter Diagnoses   Name Primary?    Lumbar back pain with radiculopathy affecting right lower extremity     Decreased strength     Muscle tightness      Physician: Najma May, *  Precautions: acquired spondylolisthesis. DM Type II, Obesity (BMI 34.75 kg/m2). Fall risk. significant knee OA  Visit #: 4 of 20  PTA Visit #: 1  Time In: 8:30  Time Out: 9:40  Total treatment time: 60 min (1:1 with PT for 60 min)    Subjective     Pt reports: that her pain was worse the other day due to the cold weather. Her c/c today is bilateral low back pain, but she denies radiationg pain down the LE. She also reports increased achiness in the knees.   Pain Scale: Sharron rates pain on a scale of 0-10 to be 4 currently.    Objective     Sharron received individual therapeutic exercises to develop strength, endurance, ROM, flexibility, posture and core stabilization for 40 minutes including:     Exercises  12/4/17 12/6/17 12/11/17    LTR  2 minutes  2 minutes 2 min    DKTC on SB  2 minutes  2 minutes 2 min    HS stretch c strap  5 x 10" hold ea  5 x 10" hold ea 5 x 10"    ITB stretch c strap  5 x 10" hold ea  5 x 10" hold ea 5 x 10"    Supine hip flexor stretch  ---  4 x 20" hold ea 4 x 20"    SLR  1 x 15 ea  2 x 10 ea  2 x 10    HL Hip Add ball squeeze  2 minutes  2 minutes   2 min    HL Hip Abduction  RTB x 2 minutes  RTB x 2 minutes GTB x 2 min    Bridges  ---  NP 2* to pain ---     Therex: add next visit - narrow rows, SALPD, open books, PPT    Sharron received the following manual therapy techniques to the lumbar spine:   10 min x manual lumbar traction  10 minutes x Right piriformis release, STM to Right piriformis and lumbar spine  Attempt next visit - Kinesiotape for paraspinal/QL inhibition, lumbar roll for joint mobility    The patient received the following " unsupervised modalities after being cleared for contradictions: 10 min x MHP to lumbar spine post treatment.    Written Home Exercises Provided: reviewed from IE.   Pt demo good understanding of the education provided. Sharron demonstrated good return demonstration of activities.     Education provided re:  Sharron verbalized good understanding of education provided.   No spiritual or educational barriers to learning provided    Assessment     Sharron tolerated treatment session well today. Good tolerance to therapeutic exercises. Patient with good response to manual care reporting centralization of radicular symptoms.    This is a 52 y.o. female referred to outpatient physical therapy and presents with a medical diagnosis of back, hip, and knee pain and demonstrates limitations as described in the problem list. Pt prognosis is Good. Pt will continue to benefit from skilled outpatient physical therapy to address the deficits listed in the problem list, provide pt/family education and to maximize pt's level of independence in the home and community environment.     Goals as follows: See IE on 11/17/17 (PN due by 12/17/17)     Plan     Continue with established Plan of Care towards PT goals.    Therapist: Мария Waddell, PT  12/11/2017

## 2017-12-13 ENCOUNTER — CLINICAL SUPPORT (OUTPATIENT)
Dept: REHABILITATION | Facility: HOSPITAL | Age: 52
End: 2017-12-13
Payer: COMMERCIAL

## 2017-12-13 DIAGNOSIS — R53.1 DECREASED STRENGTH: ICD-10-CM

## 2017-12-13 DIAGNOSIS — M62.89 MUSCLE TIGHTNESS: ICD-10-CM

## 2017-12-13 DIAGNOSIS — M54.16 LUMBAR BACK PAIN WITH RADICULOPATHY AFFECTING RIGHT LOWER EXTREMITY: ICD-10-CM

## 2017-12-13 PROCEDURE — 97110 THERAPEUTIC EXERCISES: CPT | Mod: PN

## 2017-12-13 NOTE — PROGRESS NOTES
"                                                    Physical Therapy Daily Note     Name: Sharron Shafer  Clinic Number: [id number]  Diagnosis:   Encounter Diagnoses   Name Primary?    Lumbar back pain with radiculopathy affecting right lower extremity     Decreased strength     Muscle tightness      Physician: Najma May, *  Precautions: acquired spondylolisthesis. DM Type II, Obesity (BMI 34.75 kg/m2). Fall risk. significant knee OA  Visit #: 5 of 20  PTA Visit #: 2  Time In: 2:35  Time Out: 3:40  Total treatment time: 55 min (1:1 with PTA duration of treatment session)    Subjective     Pt reports: increased low back stiffness. Patient reports increased medial Right knee pain as well.   Pain Scale: Sharron rates pain on a scale of 0-10 to be 4 currently.    Objective     Sharron received individual therapeutic exercises to develop strength, endurance, ROM, flexibility, posture and core stabilization for 45 minutes including:     Exercises  12/4/17 12/6/17 12/11/17 12/13/17     LTR  2 minutes  2 min  2 min  2 min     DKTC on SB  2 minutes  2 min  2 min  2 min     HS stretch c strap  5 x 10" hold ea  5 x 10"   5 x 10"  5 x 10"     ITB stretch c strap  5 x 10" hold ea  5 x 10"   5 x 10"  5 x 10"     Supine hip flexor stretch  ---  4 x 20"   4 x 20"  4 x 20"     SLR  1 x 15 ea  2 x 10   2 x 10  2 x 10     HL Hip Add ball squeeze  2 minutes  2 min  2 min  2 min     HL Hip Abduction  RTB x 2 minutes  2 min  GTB x 2 min  GTB x 2 min     Bridges  ---  NP 2* to pain  ---  Glute squeezes 20 x 5"     Pelvic tilts  ---  ---  ---  20 x 3"      Open book  ---  ---  ---  10 x ea     Narrow rows  ---  ---  ---  RTB x 20     SALPD  ---  ---  ---  RTB x 20      Sharron received the following manual therapy techniques to the lumbar spine: 10 minutes  Manual lumbar traction - not today  Right piriformis release, STM to Right piriformis and lumbar spine  Kinesio tape applied to Right QL/paraspinals for muscle " inhibition. Patient received education regarding appropriate care and removal of Kinesiotape. Patient instructed in proper removal techniques if skin irritation occurs.     The patient received the following unsupervised modalities after being cleared for contradictions: 10 min x MHP to lumbar spine post treatment.    Written Home Exercises Provided: reviewed from IE.   Pt demo good understanding of the education provided. Sharron demonstrated good return demonstration of activities.     Education provided re:  Sharron verbalized good understanding of education provided.   No spiritual or educational barriers to learning provided    Assessment     Sharron tolerated treatment session well today. Good tolerance to progression of therapeutic exercises with no exacerbation of symptoms. Increased tone present in Right QL and lumbar paraspinals with good response from patient reporting decreased pain and stiffness. Kinesiotape applied to Right QL for muscle inhibition with patient education on removal of tape.    This is a 52 y.o. female referred to outpatient physical therapy and presents with a medical diagnosis of back, hip, and knee pain and demonstrates limitations as described in the problem list. Pt prognosis is Good. Pt will continue to benefit from skilled outpatient physical therapy to address the deficits listed in the problem list, provide pt/family education and to maximize pt's level of independence in the home and community environment.     Goals as follows: See IE on 11/17/17 (PN due by 12/17/17)     Plan     Continue with established Plan of Care towards PT goals.    Therapist: Tootie Becerra, PTA  12/13/2017

## 2017-12-18 ENCOUNTER — CLINICAL SUPPORT (OUTPATIENT)
Dept: REHABILITATION | Facility: HOSPITAL | Age: 52
End: 2017-12-18
Payer: COMMERCIAL

## 2017-12-18 DIAGNOSIS — R53.1 DECREASED STRENGTH: ICD-10-CM

## 2017-12-18 DIAGNOSIS — M62.89 MUSCLE TIGHTNESS: ICD-10-CM

## 2017-12-18 DIAGNOSIS — M54.16 LUMBAR BACK PAIN WITH RADICULOPATHY AFFECTING RIGHT LOWER EXTREMITY: ICD-10-CM

## 2017-12-18 PROCEDURE — 97110 THERAPEUTIC EXERCISES: CPT | Mod: PN

## 2017-12-27 ENCOUNTER — CLINICAL SUPPORT (OUTPATIENT)
Dept: REHABILITATION | Facility: HOSPITAL | Age: 52
End: 2017-12-27
Payer: COMMERCIAL

## 2017-12-27 DIAGNOSIS — M62.89 MUSCLE TIGHTNESS: ICD-10-CM

## 2017-12-27 DIAGNOSIS — M54.16 LUMBAR BACK PAIN WITH RADICULOPATHY AFFECTING RIGHT LOWER EXTREMITY: ICD-10-CM

## 2017-12-27 DIAGNOSIS — R53.1 DECREASED STRENGTH: ICD-10-CM

## 2017-12-27 PROCEDURE — 97110 THERAPEUTIC EXERCISES: CPT | Mod: PN

## 2017-12-27 NOTE — PROGRESS NOTES
"                                                    Physical Therapy Daily Note     Name: Sharron Shafer  Clinic Number: [id number]  Diagnosis:   Encounter Diagnoses   Name Primary?    Lumbar back pain with radiculopathy affecting right lower extremity     Decreased strength     Muscle tightness      Physician: Najma May, *  Precautions: acquired spondylolisthesis. DM Type II, Obesity (BMI 34.75 kg/m2). Fall risk. significant knee OA  Visit #: 7 of 20  PTA Visit #: 3  Time In: 3:00   Time Out: 4:05  Total treatment time: 65 min (1:1 with PTA duration of treatment session)    Subjective     Pt reports: Sharron reports having increased pain this afternoon reporting pain down her Right leg. Patient reports that today may be her last session secondary to insurance changes at the 1st of the year.  Pain Scale: Sharron rates pain on a scale of 0-10 to be 6 currently.    Objective     Thoracic/Lumbar AROM: Pain/Dysfunction with Movement:   Flexion Mod limited, fingertips 6" from floor without provocation of pain   Extension Mod limited, 10 deg ext from LSP with mild increase in lumbar lordosis, no extension from hips   Right side bending Mod-Max howard, 2" above knee joint line, c/o pain   Left side bending Mod-max howard, 2" above knee joint line   Right rotation Standing 50 deg, sitting 50 deg, c/o pain   Left rotation Standing 75 deg, sitting 75 deg, c/o pain in R LB (worse than RR)       Lower Extremity Strength  Right LE   Left LE     Hip flexion: 3*/5 Hip flexion: 4/5   Hip extension:  4-/5 Hip extension: 4/5   Hip abduction: 4*/5 Hip abduction: 4+/5   Hip adduction:  5*/5 Hip adduction:  5/5   Knee Flexion 5/5 Knee Flexion 5/5   Knee Extension 5*/5 Knee Extension 5/5   Ankle dorsiflexion: 5/5 Ankle dorsiflexion: 5/5   Ankle plantarflexion: 5/5  Able but with pain Ankle plantarflexion: 5/5  Able but with pain   Trunk (Abs) poor Trunk (Abs) poor      Flexibility:   Ariel test              Hip flexors: poor   " "           Quads: poor  Anita test              ITB: fair  Hamstring (SLR): poor-fair      Sharron received individual therapeutic exercises to develop strength, endurance, ROM, flexibility, posture and core stabilization for 65 minutes including:     Exercises  12/27/17 12/18/17 12/13/17 12/11/17 12/6/17 12/4/17    Visit  7  6  5 - FOTO done  4  3  2             Warm-up: Nustep  6 mins  6 mins  ---  ---  ---  ---    LTR  2 min  2 min  2 min  2 min  2 min  2 min    DKTC on SB  2 min  2 min  2 min  2 min  2 min  2 min    HS stretch c strap  4 x 20" ea  4 x 20" ea  5 x 10"  5 x 10" ea  5 x 10" ea  5 x 10" ea    ITB stretch c strap  4 x 20" ea  4 x 20" ea  5 x 10"  5 x 10" ea  5 x 10" ea  5 x 10" ea    Supine hip flexor stretch  4 x 20" ea  4 x 20" ea  4 x 20"  4 x 20"  4 x 20" ea  ---    SLR  2 x 10 ea  2 x 10 ea  2 x 10  2 x 10  2 x 10  1 x 15 ea    HL Hip Add ball squeeze  2 min  2 min  2 min  2 min  2 mins  2 minutes    HL Hip Abduction  2 min  2 min  2 min  GTB x 2 min  RTB x 2 min  RTB x 2 min    Glute squeezes  20 x 5"  20 x 5"   20 x 5"  ---  NP 2* to pain  ---    Pelvic tilts  ---  20 x 3"  20 x 3"  ---  ---  ---    Open book  ---  10 x ea  10 x ea  ---  ---  ---    Narrow rows  ---  RTB x 20  RTB x 20  ---  ---  ---    SALPD  ---  RTB x 20  RTB x 20  ---  ---  ---     Sharron received the following manual therapy techniques to the lumbar spine: 00 minutes  Manual lumbar traction - not today  Right piriformis release, STM to Right piriformis and lumbar spine  Kinesio tape applied to Right QL/paraspinals for muscle inhibition. Patient received education regarding appropriate care and removal of Kinesiotape. Patient instructed in proper removal techniques if skin irritation occurs.     The patient received the following unsupervised modalities after being cleared for contradictions: 00 min x MHP to lumbar spine post treatment.    Written Home Exercises Provided: patient was issued an updated I HEP " including exercises listed above in flow sheet - pt was issued a red and green theraband.   Pt demo good understanding of the education provided. Sharron demonstrated good return demonstration of activities.     Education provided re:  Sharron verbalized good understanding of education provided.   No spiritual or educational barriers to learning provided    Assessment     Sharron tolerated treatment session well today. Good tolerance to therapeutic exercises with no exacerbation of symptoms. Patient was issued an updated independent HEP this session. Monthly assessment performed today. Pt presenting with minimal improvements in lumbar spine ROM and BLE strength, with greatest provocation of pain in the R knee. Pt is in progress towards achieving her short and long term goals likely due to poor to fair compliance with attendance to therapy sessions.   This is a 52 y.o. female referred to outpatient physical therapy and presents with a medical diagnosis of back, hip, and knee pain and demonstrates limitations as described in the problem list. Pt prognosis is Good. Pt will continue to benefit from skilled outpatient physical therapy to address the deficits listed in the problem list, provide pt/family education and to maximize pt's level of independence in the home and community environment.     Goals as follows: See IE on 11/17/17 (PN due by 12/17/17)  Short Term Goals (5 Weeks):  1. Pt able to stand >=30 minutes with ADLs with <4/10 pain. - in progress  2. Pt able to walk >=30 minutes with household chores with <4/10 pain. - in progress  3. Pt able to sit >=30 minutes with CPU work with <4/10 pain. - in progress  4. Pt to demonstrate improved postural awareness and is able to self correct with min cuing. - met  5. Pt to demonstrate improved functional ability with FOTO limitation <=34% disability. - in progress     Plan     Continue with established Plan of Care towards PT goals.    Therapist: Tootie Becerra,  PTA, Светлана Stinson, PT    12/27/2017

## 2017-12-27 NOTE — PATIENT INSTRUCTIONS
Supine Knee-to-Chest, Bilateral        Lie on back, hands clasped behind both knees. Pull knees in toward chest until a comfortable stretch is felt in lower back and buttocks. Hold 5 seconds.  Repeat 10 times per session. Do 1 sessions per day.    Supine With Rotation        Lie, back flat, legs bent, feet together. Rotate knees to one side. Hold 3 seconds. Repeat to other side.  Repeat 15 times per session. Do 1 sessions per day.      PELVIC TILT: Posterior          Tighten abdominals, flatten low back. 20 reps per set, 1 sets per day, 1 days per week    Supine: Leg Stretch With Strap (Basic)        Lie on back with one knee bent, foot flat on floor. Hook strap around other foot. Straighten knee. Keep knee level with other knee. Hold 20 seconds. Relax leg completely down to floor.   Repeat 4 times per session. Do 1 sessions per day.      Strengthening: Resisted Row    Face anchor at waist height, medium to wide stance. Thumbs up, pull arms back, squeezing shoulder blades together. Do not shrug up. Slowly return to start.  Repeat 20 times each side per set. Do 1 sets per session. Do 1 sessions per day.      Strengthening: Resisted Extension    Hold Red elastic band in both hand, elbow straight and arm in front of body. Pull arm back, elbow straight, and squeeze shoulder blades back. Do not shrug.  Repeat 20 times each side per set. Do 1 sets per session. Do 1 sessions per day.    Straight Leg Raise     With right leg straight, other leg bent, raise straight leg until knees are even. Slowly lower. Roll on your side and repeat lifting top leg up, on other side, lifting bottom leg up, and on stomach kicking back (squeezing your rear end before you kick back).  Repeat 20 times each leg per set. Do 1 sets per session. Do 1 sessions per day.       Glute Squeeze, supine    Lie face up and squeeze your rear end. Do not tighten your abdominals or hold your breath. Hold 5 seconds. Relax.  Repeat 20 times per set. Do 1 sets  per session. Do 1 sessions per day.      Strengthening: Hip Adduction - Isometric    Sit back or lie down with ball or folded pillow between knees, and squeeze knees together. Hold 3 seconds.  Repeat 30 times per set. Do 1 sets per session. Do 1 sessions per day.      Strengthening: Hip Abductor - Resisted    Lie flat with band looped around both legs above knees, and push thighs apart, ensuring right and left move together.  Repeat 30 times per set. Do 1 sets per session. Do 1 sessions per day.        Copyright © [employer]. All rights reserved.

## 2018-01-08 DIAGNOSIS — Z12.31 SCREENING MAMMOGRAM, ENCOUNTER FOR: Primary | ICD-10-CM

## 2018-01-09 ENCOUNTER — HOSPITAL ENCOUNTER (OUTPATIENT)
Dept: RADIOLOGY | Facility: HOSPITAL | Age: 53
Discharge: HOME | End: 2018-01-09
Attending: OBSTETRICS & GYNECOLOGY
Payer: COMMERCIAL

## 2018-01-09 VITALS — WEIGHT: 190 LBS | HEIGHT: 62 IN | BODY MASS INDEX: 34.96 KG/M2

## 2018-01-09 DIAGNOSIS — Z12.31 SCREENING MAMMOGRAM, ENCOUNTER FOR: ICD-10-CM

## 2018-01-09 PROCEDURE — 77063 BREAST TOMOSYNTHESIS BI: CPT | Mod: 26,,, | Performed by: RADIOLOGY

## 2018-01-09 PROCEDURE — 77067 SCR MAMMO BI INCL CAD: CPT | Mod: TC

## 2018-01-09 PROCEDURE — 77067 SCR MAMMO BI INCL CAD: CPT | Mod: 26,,, | Performed by: RADIOLOGY

## 2018-01-26 DIAGNOSIS — E11.9 TYPE 2 DIABETES MELLITUS WITHOUT COMPLICATION: ICD-10-CM

## 2018-08-03 DIAGNOSIS — E11.9 TYPE 2 DIABETES MELLITUS WITHOUT COMPLICATION: ICD-10-CM

## 2019-01-03 ENCOUNTER — TELEPHONE (OUTPATIENT)
Dept: INTERNAL MEDICINE | Facility: CLINIC | Age: 54
End: 2019-01-03

## 2019-01-03 DIAGNOSIS — Z00.00 ROUTINE GENERAL MEDICAL EXAMINATION AT A HEALTH CARE FACILITY: Primary | ICD-10-CM

## 2019-01-03 NOTE — TELEPHONE ENCOUNTER
----- Message from Hue Decker sent at 1/3/2019 11:57 AM CST -----  Contact: [phone]  Doctor appointment and lab have been scheduled.  Please link lab orders to the lab appointment.  Date of doctor appointment:  02/15/19  Physical or EP:  EP  Date of lab appointment:  N/A  Comments: PT would like he lab orders sent to [facility].

## 2019-01-10 ENCOUNTER — OFFICE VISIT (OUTPATIENT)
Dept: OPTOMETRY | Facility: CLINIC | Age: 54
End: 2019-01-10
Payer: COMMERCIAL

## 2019-01-10 DIAGNOSIS — H52.7 REFRACTIVE ERROR: ICD-10-CM

## 2019-01-10 DIAGNOSIS — E11.9 TYPE 2 DIABETES MELLITUS WITHOUT RETINOPATHY: Primary | ICD-10-CM

## 2019-01-10 PROCEDURE — 92015 DETERMINE REFRACTIVE STATE: CPT | Mod: S$GLB,,, | Performed by: OPTOMETRIST

## 2019-01-10 PROCEDURE — 99999 PR PBB SHADOW E&M-EST. PATIENT-LVL II: ICD-10-PCS | Mod: PBBFAC,,, | Performed by: OPTOMETRIST

## 2019-01-10 PROCEDURE — 92015 PR REFRACTION: ICD-10-PCS | Mod: S$GLB,,, | Performed by: OPTOMETRIST

## 2019-01-10 PROCEDURE — 99999 PR PBB SHADOW E&M-EST. PATIENT-LVL II: CPT | Mod: PBBFAC,,, | Performed by: OPTOMETRIST

## 2019-01-10 PROCEDURE — 92004 COMPRE OPH EXAM NEW PT 1/>: CPT | Mod: S$GLB,,, | Performed by: OPTOMETRIST

## 2019-01-10 PROCEDURE — 92004 PR EYE EXAM, NEW PATIENT,COMPREHESV: ICD-10-PCS | Mod: S$GLB,,, | Performed by: OPTOMETRIST

## 2019-01-10 NOTE — PATIENT INSTRUCTIONS
No diabetic retinopathy. Maintain good blood sugar control. Return yearly for diabetic eye exam, sooner if needed.      Diabetic Retinopathy: Controlling Your Risk Factors     As often as possible, keep your blood sugar in the target range.     Diabetic retinopathy happens when diabetes harms blood vessels in the rear of the eye. This can lead to vision loss. You can help reduce your risk of vision loss by taking care of your health. Managing your diabetes and other health problems can make diabetic retinopathy less likely.    Manage your diabetes    You can help protect your vision. To do this, keep your blood sugar level in a healthy range, check your blood sugar often, follow your diabetes management plan, and work closely with your healthcare providers. This includes your endocrinologist (a healthcare provider who specializes in diabetes), primary care provider, or any other provider who helps to manage your diabetes. He or she can help if you are having trouble keeping your blood sugar in range.    Control your risk factors    There are other factors that damage blood vessels. These can make diabetic retinopathy worse. These factors include:  · High blood pressure  · Smoking  · High cholesterol  Work with your healthcare team to control these problems. This can help lower your risk of developing diabetic retinopathy. A diabetes educator can help you control blood pressure and high cholesterol. He or she can also talk to you about programs to help you quit smoking. Diabetic patients should also see an eye healthcare provider yearlyfor an eye exam.    To learn more    The resources below can help you learn more:  · American Diabetes Association   352.227.2911  www.diabetes.org  · [employer]   200.992.6405  www.lighthouse.org  · [employer] [facility] [place]   141.675.6921  www.nei.nih.gov   · [employer]   718.266.2600 www.hormone.org    Date Last Reviewed: 6/1/2016    © 2388-9256 [employer]  [employer], [employer]. [address] [place] [address], [place] [address]. All rights reserved. This information is not intended as a substitute for professional medical care. Always follow your healthcare professional's instructions.

## 2019-01-10 NOTE — PROGRESS NOTES
Subjective:       Patient ID: Sharron Shafer is a 53 y.o. female      Chief Complaint   Patient presents with    Diabetic Eye Exam     History of Present Illness  Dls: 2015 Dr. Gutiérrez     54 y/o female presents today for diabetic eye exam.  Pt states no changes in vision. Pt wears otc readers.      2 weeks ago, A1c = 6.5-7 per pt (about 6 months ago)     No tearing  No itching  No burning  No pain  No ha's  No floaters  No flashes    Eye meds  None    Hemoglobin A1C       Date                     Value               Ref Range           Status            09/16/2014               7.7 (H)             4.5 - 6.2 %         Final             08/06/2010               6.3 (H)             4.0 - 6.2 %         Final             12/18/2009               6.3 (H)             4.0 - 6.2 %         Final        ----------     Assessment/Plan:     1. Type 2 diabetes mellitus without retinopathy  No diabetic retinopathy. Discussed with pt the effects of diabetes on vision, importance of good blood sugar control, compliance with meds, and follow up care with PCP. Return in 1 year for dilated eye exam, sooner PRN.    2. Refractive error  Optional rx. Can use readers if preferred.    Eyeglass Final Rx     Eyeglass Final Rx       Sphere Cylinder Add    Right +0.25 Sphere +1.75    Left +0.50 Sphere +1.75    Expiration Date:  1/11/2020                  Follow-up in about 1 year (around 1/10/2020) for Diabetic Eye Exam.

## 2019-02-05 ENCOUNTER — HOSPITAL ENCOUNTER (OUTPATIENT)
Dept: RADIOLOGY | Facility: HOSPITAL | Age: 54
Discharge: HOME | End: 2019-02-05
Attending: OBSTETRICS & GYNECOLOGY
Payer: COMMERCIAL

## 2019-02-05 VITALS — HEIGHT: 62 IN | BODY MASS INDEX: 34.96 KG/M2 | WEIGHT: 190 LBS

## 2019-02-05 DIAGNOSIS — Z12.39 BREAST SCREENING: ICD-10-CM

## 2019-02-05 DIAGNOSIS — Z12.39 BREAST SCREENING: Primary | ICD-10-CM

## 2019-02-05 PROCEDURE — 77067 SCR MAMMO BI INCL CAD: CPT | Mod: 26,,, | Performed by: RADIOLOGY

## 2019-02-05 PROCEDURE — 77067 SCR MAMMO BI INCL CAD: CPT | Mod: TC,PO

## 2019-02-05 PROCEDURE — 77063 BREAST TOMOSYNTHESIS BI: CPT | Mod: 26,,, | Performed by: RADIOLOGY

## 2019-02-05 PROCEDURE — 77063 MAMMO DIGITAL SCREENING BILAT WITH TOMOSYNTHESIS_CAD: ICD-10-PCS | Mod: 26,,, | Performed by: RADIOLOGY

## 2019-02-05 PROCEDURE — 77067 MAMMO DIGITAL SCREENING BILAT WITH TOMOSYNTHESIS_CAD: ICD-10-PCS | Mod: 26,,, | Performed by: RADIOLOGY

## 2019-02-09 LAB
ALBUMIN SERPL-MCNC: 3.5 G/DL (ref 3.6–5.1)
ALBUMIN/CREAT UR: 3 MCG/MG CREAT
ALBUMIN/GLOB SERPL: 1 (CALC) (ref 1–2.5)
ALP SERPL-CCNC: 117 U/L (ref 33–130)
ALT SERPL-CCNC: 15 U/L (ref 6–29)
APPEARANCE UR: CLEAR
AST SERPL-CCNC: 15 U/L (ref 10–35)
BASOPHILS # BLD AUTO: 20 CELLS/UL (ref 0–200)
BASOPHILS NFR BLD AUTO: 0.4 %
BILIRUB SERPL-MCNC: 0.5 MG/DL (ref 0.2–1.2)
BILIRUB UR QL STRIP: NEGATIVE
BUN SERPL-MCNC: 8 MG/DL (ref 7–25)
BUN/CREAT SERPL: ABNORMAL (CALC) (ref 6–22)
CALCIUM SERPL-MCNC: 9 MG/DL (ref 8.6–10.4)
CHLORIDE SERPL-SCNC: 103 MMOL/L (ref 98–110)
CHOLEST SERPL-MCNC: 224 MG/DL
CHOLEST/HDLC SERPL: 3.2 (CALC)
CO2 SERPL-SCNC: 28 MMOL/L (ref 20–32)
COLOR UR: YELLOW
CREAT SERPL-MCNC: 0.75 MG/DL (ref 0.5–1.05)
CREAT UR-MCNC: 154 MG/DL (ref 20–275)
EOSINOPHIL # BLD AUTO: 211 CELLS/UL (ref 15–500)
EOSINOPHIL NFR BLD AUTO: 4.3 %
ERYTHROCYTE [DISTWIDTH] IN BLOOD BY AUTOMATED COUNT: 15 % (ref 11–15)
GFRSERPLBLD MDRD-ARVRAT: 90 ML/MIN/1.73M2
GLOBULIN SER CALC-MCNC: 3.5 G/DL (CALC) (ref 1.9–3.7)
GLUCOSE SERPL-MCNC: 173 MG/DL (ref 65–99)
GLUCOSE UR QL STRIP: NEGATIVE
HBA1C MFR BLD: 10 % OF TOTAL HGB
HCT VFR BLD AUTO: 33.4 % (ref 35–45)
HDLC SERPL-MCNC: 71 MG/DL
HGB BLD-MCNC: 10.8 G/DL (ref 11.7–15.5)
HGB UR QL STRIP: NEGATIVE
KETONES UR QL STRIP: NEGATIVE
LDLC SERPL CALC-MCNC: 130 MG/DL (CALC)
LEUKOCYTE ESTERASE UR QL STRIP: NEGATIVE
LYMPHOCYTES # BLD AUTO: 2161 CELLS/UL (ref 850–3900)
LYMPHOCYTES NFR BLD AUTO: 44.1 %
MCH RBC QN AUTO: 25.4 PG (ref 27–33)
MCHC RBC AUTO-ENTMCNC: 32.3 G/DL (ref 32–36)
MCV RBC AUTO: 78.6 FL (ref 80–100)
MICROALBUMIN UR-MCNC: 0.4 MG/DL
MONOCYTES # BLD AUTO: 372 CELLS/UL (ref 200–950)
MONOCYTES NFR BLD AUTO: 7.6 %
NEUTROPHILS # BLD AUTO: 2136 CELLS/UL (ref 1500–7800)
NEUTROPHILS NFR BLD AUTO: 43.6 %
NITRITE UR QL STRIP: POSITIVE
NONHDLC SERPL-MCNC: 153 MG/DL (CALC)
PH UR STRIP: 6.5 [PH] (ref 5–8)
PLATELET # BLD AUTO: 413 THOUSAND/UL (ref 140–400)
PMV BLD REES-ECKER: 10.4 FL (ref 7.5–12.5)
POTASSIUM SERPL-SCNC: 4.2 MMOL/L (ref 3.5–5.3)
PROT SERPL-MCNC: 7 G/DL (ref 6.1–8.1)
PROT UR QL STRIP: NEGATIVE
RBC # BLD AUTO: 4.25 MILLION/UL (ref 3.8–5.1)
SODIUM SERPL-SCNC: 140 MMOL/L (ref 135–146)
SP GR UR STRIP: 1.02 (ref 1–1.03)
TRIGL SERPL-MCNC: 118 MG/DL
TSH SERPL-ACNC: 1.2 MIU/L
WBC # BLD AUTO: 4.9 THOUSAND/UL (ref 3.8–10.8)

## 2019-02-15 ENCOUNTER — OFFICE VISIT (OUTPATIENT)
Dept: INTERNAL MEDICINE | Facility: CLINIC | Age: 54
End: 2019-02-15
Payer: COMMERCIAL

## 2019-02-15 VITALS
OXYGEN SATURATION: 97 % | HEIGHT: 62 IN | DIASTOLIC BLOOD PRESSURE: 70 MMHG | HEART RATE: 78 BPM | SYSTOLIC BLOOD PRESSURE: 145 MMHG | TEMPERATURE: 100 F | WEIGHT: 195.13 LBS | BODY MASS INDEX: 35.91 KG/M2

## 2019-02-15 DIAGNOSIS — E78.5 HYPERLIPIDEMIA, UNSPECIFIED HYPERLIPIDEMIA TYPE: ICD-10-CM

## 2019-02-15 DIAGNOSIS — I10 ESSENTIAL HYPERTENSION: Chronic | ICD-10-CM

## 2019-02-15 DIAGNOSIS — E66.01 CLASS 2 SEVERE OBESITY WITH SERIOUS COMORBIDITY AND BODY MASS INDEX (BMI) OF 35.0 TO 35.9 IN ADULT, UNSPECIFIED OBESITY TYPE: ICD-10-CM

## 2019-02-15 DIAGNOSIS — D64.9 ANEMIA, UNSPECIFIED TYPE: ICD-10-CM

## 2019-02-15 DIAGNOSIS — Z98.84 STATUS POST GASTRIC BYPASS FOR OBESITY: ICD-10-CM

## 2019-02-15 DIAGNOSIS — Z00.00 WELL ADULT EXAM: Primary | ICD-10-CM

## 2019-02-15 DIAGNOSIS — E11.9 TYPE 2 DIABETES MELLITUS WITHOUT COMPLICATION, WITHOUT LONG-TERM CURRENT USE OF INSULIN: Chronic | ICD-10-CM

## 2019-02-15 PROCEDURE — 3046F HEMOGLOBIN A1C LEVEL >9.0%: CPT | Mod: CPTII,S$GLB,, | Performed by: INTERNAL MEDICINE

## 2019-02-15 PROCEDURE — 99999 PR PBB SHADOW E&M-EST. PATIENT-LVL III: ICD-10-PCS | Mod: PBBFAC,,, | Performed by: INTERNAL MEDICINE

## 2019-02-15 PROCEDURE — 3046F PR MOST RECENT HEMOGLOBIN A1C LEVEL > 9.0%: ICD-10-PCS | Mod: CPTII,S$GLB,, | Performed by: INTERNAL MEDICINE

## 2019-02-15 PROCEDURE — 3077F PR MOST RECENT SYSTOLIC BLOOD PRESSURE >= 140 MM HG: ICD-10-PCS | Mod: CPTII,S$GLB,, | Performed by: INTERNAL MEDICINE

## 2019-02-15 PROCEDURE — 3078F PR MOST RECENT DIASTOLIC BLOOD PRESSURE < 80 MM HG: ICD-10-PCS | Mod: CPTII,S$GLB,, | Performed by: INTERNAL MEDICINE

## 2019-02-15 PROCEDURE — 99999 PR PBB SHADOW E&M-EST. PATIENT-LVL III: CPT | Mod: PBBFAC,,, | Performed by: INTERNAL MEDICINE

## 2019-02-15 PROCEDURE — 99396 PR PREVENTIVE VISIT,EST,40-64: ICD-10-PCS | Mod: S$GLB,,, | Performed by: INTERNAL MEDICINE

## 2019-02-15 PROCEDURE — 3078F DIAST BP <80 MM HG: CPT | Mod: CPTII,S$GLB,, | Performed by: INTERNAL MEDICINE

## 2019-02-15 PROCEDURE — 3077F SYST BP >= 140 MM HG: CPT | Mod: CPTII,S$GLB,, | Performed by: INTERNAL MEDICINE

## 2019-02-15 PROCEDURE — 99396 PREV VISIT EST AGE 40-64: CPT | Mod: S$GLB,,, | Performed by: INTERNAL MEDICINE

## 2019-02-15 RX ORDER — LANCETS
EACH MISCELLANEOUS
Qty: 100 EACH | Refills: 3 | Status: SHIPPED | OUTPATIENT
Start: 2019-02-15 | End: 2019-05-17

## 2019-02-15 RX ORDER — PRAVASTATIN SODIUM 40 MG/1
40 TABLET ORAL NIGHTLY
Qty: 30 TABLET | Refills: 11 | Status: SHIPPED | OUTPATIENT
Start: 2019-02-15 | End: 2019-11-19 | Stop reason: SDUPTHER

## 2019-02-15 RX ORDER — METFORMIN HYDROCHLORIDE 750 MG/1
750 TABLET, EXTENDED RELEASE ORAL
Qty: 30 TABLET | Refills: 11 | Status: SHIPPED | OUTPATIENT
Start: 2019-02-15 | End: 2019-05-17

## 2019-02-15 RX ORDER — LOSARTAN POTASSIUM AND HYDROCHLOROTHIAZIDE 12.5; 1 MG/1; MG/1
1 TABLET ORAL DAILY
Qty: 30 TABLET | Refills: 11 | Status: SHIPPED | OUTPATIENT
Start: 2019-02-15 | End: 2019-11-19 | Stop reason: SDUPTHER

## 2019-02-15 RX ORDER — INSULIN PUMP SYRINGE, 3 ML
EACH MISCELLANEOUS
Qty: 1 EACH | Refills: 0 | Status: SHIPPED | OUTPATIENT
Start: 2019-02-15 | End: 2019-05-17

## 2019-02-15 NOTE — PROGRESS NOTES
Subjective:       Patient ID: Sharron Shafer is a 53 y.o. female.    Chief Complaint: Follow-up (diabetes) and Annual Exam    HPI   The patient presents for follow-up of medical conditions which include type 2 diabetes mellitus, hypertension, hyperlipidemia, and osteoarthritis of the knees.  The patient is status post gastric bypass in 2007.  She has iron deficiency anemia.  The patient has been out of her medications for 8 months.  Her last office visit with me was in October 2017.    Recent symptoms of lower back pain with right-sided sciatica have resolved.  The patient still notes bilateral knee pain due to osteoarthritis.    No interval change in past medical history, family history, or social history since prior evaluations.  Family history is positive for diabetes mellitus.    The patient had a normal colonoscopy on 10/21/2014.  A follow-up screening examination in 10 years was recommended.  The patient had a normal eye exam on 1/10/2019.  She has received influenza vaccine through her work.    Review of Systems   Constitutional: Positive for activity change and fatigue. Negative for appetite change and unexpected weight change.   Eyes: Negative for visual disturbance.   Respiratory: Negative for shortness of breath.    Cardiovascular: Negative for chest pain, palpitations and leg swelling.   Gastrointestinal: Negative for abdominal pain, blood in stool and diarrhea.   Genitourinary: Negative for dysuria, frequency, hematuria and urgency.   Musculoskeletal: Positive for arthralgias and joint swelling.   Neurological: Negative for weakness, numbness and headaches.   Psychiatric/Behavioral: Negative for sleep disturbance.       Objective:      Physical Exam   Constitutional: She is oriented to person, place, and time. She appears well-developed and well-nourished. No distress.   The patient has gained 5 lb since 10/26/2017.   HENT:   Head: Normocephalic and atraumatic.   Eyes: Conjunctivae and EOM are normal.  Pupils are equal, round, and reactive to light. No scleral icterus.   Neck: Normal range of motion. Neck supple. No JVD present. No thyromegaly present.   Cardiovascular: Normal rate, regular rhythm, normal heart sounds and intact distal pulses. Exam reveals no gallop and no friction rub.   No murmur heard.  Pulmonary/Chest: Effort normal and breath sounds normal. No respiratory distress. She has no wheezes. She has no rales.   Abdominal: Soft. Bowel sounds are normal. She exhibits no mass. There is no tenderness.   Musculoskeletal: She exhibits tenderness. She exhibits no edema.   Knee joint tenderness is present bilaterally.   Lymphadenopathy:     She has no cervical adenopathy.   Neurological: She is alert and oriented to person, place, and time. No cranial nerve deficit.   Sensory exam is intact in both feet on monofilament and vibration testing.   Skin: Skin is warm and dry. No rash noted.   No foot lesions are present.   Psychiatric: She has a normal mood and affect. Her behavior is normal.   Nursing note and vitals reviewed.      Results for orders placed or performed in visit on 01/03/19   CBC auto differential   Result Value Ref Range    WBC 4.9 3.8 - 10.8 Thousand/uL    RBC 4.25 3.80 - 5.10 Million/uL    Hemoglobin 10.8 (L) 11.7 - 15.5 g/dL    Hematocrit 33.4 (L) 35.0 - 45.0 %    MCV 78.6 (L) 80.0 - 100.0 fL    MCH 25.4 (L) 27.0 - 33.0 pg    MCHC 32.3 32.0 - 36.0 g/dL    RDW 15.0 11.0 - 15.0 %    Platelets 413 (H) 140 - 400 Thousand/uL    MPV 10.4 7.5 - 12.5 fL    Neutrophils Absolute 2,136 1,500 - 7,800 cells/uL    Lymph # 2,161 850 - 3,900 cells/uL    Mono # 372 200 - 950 cells/uL    Eos # 211 15 - 500 cells/uL    Baso # 20 0 - 200 cells/uL    Neutrophils Relative 43.6 %    Lymph% 44.1 %    Mono% 7.6 %    Eosinophil% 4.3 %    Basophil% 0.4 %   Comprehensive metabolic panel   Result Value Ref Range    Glucose 173 (H) 65 - 99 mg/dL    BUN, Bld 8 7 - 25 mg/dL    Creatinine 0.75 0.50 - 1.05 mg/dL    eGFR if  non  90 > OR = 60 mL/min/1.73m2    eGFR if African American 105 > OR = 60 mL/min/1.73m2    BUN/Creatinine Ratio NOT APPLICABLE 6 - 22 (calc)    Sodium 140 135 - 146 mmol/L    Potassium 4.2 3.5 - 5.3 mmol/L    Chloride 103 98 - 110 mmol/L    CO2 28 20 - 32 mmol/L    Calcium 9.0 8.6 - 10.4 mg/dL    Total Protein 7.0 6.1 - 8.1 g/dL    Albumin 3.5 (L) 3.6 - 5.1 g/dL    Globulin, Total 3.5 1.9 - 3.7 g/dL (calc)    Albumin/Globulin Ratio 1.0 1.0 - 2.5 (calc)    Total Bilirubin 0.5 0.2 - 1.2 mg/dL    Alkaline Phosphatase 117 33 - 130 U/L    AST 15 10 - 35 U/L    ALT 15 6 - 29 U/L   Lipid panel   Result Value Ref Range    Cholesterol 224 (H) <200 mg/dL    HDL 71 >50 mg/dL    Triglycerides 118 <150 mg/dL    LDL Cholesterol 130 (H) mg/dL (calc)    HDL/Chol Ratio 3.2 <5.0 (calc)    Non HDL Chol. (LDL+VLDL) 153 (H) <130 mg/dL (calc)   TSH   Result Value Ref Range    TSH 1.20 mIU/L   Hemoglobin A1c   Result Value Ref Range    Hemoglobin A1C 10.0 (H) <5.7 % of total Hgb   Microalbumin/creatinine urine ratio   Result Value Ref Range    Creatinine, Random Ur 154 20 - 275 mg/dL    Microalb, Ur 0.4 See Note: mg/dL    Microalb Creat Ratio 3 <30 mcg/mg creat   Urinalysis   Result Value Ref Range    Color, UA YELLOW YELLOW    Appearance, UA CLEAR CLEAR    Specific Gravity, UA 1.020 1.001 - 1.035    pH, UA 6.5 5.0 - 8.0    Glucose, UA NEGATIVE NEGATIVE    Bilirubin, UA NEGATIVE NEGATIVE    Ketones, UA NEGATIVE NEGATIVE    Occult Blood UA NEGATIVE NEGATIVE    Protein, UA NEGATIVE NEGATIVE    Nitrite, UA POSITIVE (A) NEGATIVE    Leukocytes, UA NEGATIVE NEGATIVE     HbA1c 10%.  Assessment:       1. Well adult exam    2. Type 2 diabetes mellitus without complication, without long-term current use of insulin    3. Essential hypertension    4. Hyperlipidemia, unspecified hyperlipidemia type    5. Class 2 severe obesity with serious comorbidity and body mass index (BMI) of 35.0 to 35.9 in adult, unspecified obesity type    6.  Status post gastric bypass for obesity    7. Anemia, unspecified type        Plan:       Sharron was seen today for follow-up and annual exam.  Updated lab studies will be obtained at [facility] next week.  The patient's diabetes is poorly controlled.  She needs to be restarted on medications and blood glucose monitoring needs to be reinstituted.  A prescription for a glucometer kit was given to the patient.  Her blood pressure is uncontrolled.  Antihypertensive medication needs to be restarted.  There is worsening anemia.  Additional anemia evaluation will be ordered to check for iron and B12 deficiency.  This may be an effect from her previous gastric bypass surgery.  Additional vitamin supplementation may be indicated.  This may improve symptoms of fatigue.  Improvement in blood sugar control should also improve her symptoms of fatigue.  I anticipate the patient should be able to return to her full work duties within 4-6 weeks after resuming treatment for her diabetes and hypertension.  Current medications were renewed.  The patient is to return to clinic in 3 months.    Diagnoses and all orders for this visit:    Well adult exam    Type 2 diabetes mellitus without complication, without long-term current use of insulin    Essential hypertension    Hyperlipidemia, unspecified hyperlipidemia type    Class 2 severe obesity with serious comorbidity and body mass index (BMI) of 35.0 to 35.9 in adult, unspecified obesity type    Status post gastric bypass for obesity  -     Iron and TIBC; Future  -     Ferritin; Future  -     Vitamin D; Future  -     Vitamin B12; Future  -     Magnesium; Future  -     Iron and TIBC  -     Ferritin  -     Vitamin D  -     Vitamin B12  -     Magnesium    Anemia, unspecified type  -     Iron and TIBC; Future  -     Ferritin; Future  -     Vitamin D; Future  -     Vitamin B12; Future  -     Magnesium; Future  -     Iron and TIBC  -     Ferritin  -     Vitamin D  -     Vitamin B12  -      Magnesium    Other orders  -     losartan-hydrochlorothiazide 100-12.5 mg (HYZAAR) 100-12.5 mg Tab; Take 1 tablet by mouth once daily.  -     metFORMIN (GLUCOPHAGE-XR) 750 MG 24 hr tablet; Take 1 tablet (750 mg total) by mouth daily with breakfast.  -     pravastatin (PRAVACHOL) 40 MG tablet; Take 1 tablet (40 mg total) by mouth nightly. For cholesterol control.  -     blood-glucose meter kit; To check BG 2 times daily, to use with insurance preferred meter  -     lancets Misc; To check BG 2 times daily, to use with insurance preferred meter  -     blood sugar diagnostic Strp; To check BG 2 times daily, to use with insurance preferred meter

## 2019-03-04 ENCOUNTER — TELEPHONE (OUTPATIENT)
Dept: INTERNAL MEDICINE | Facility: CLINIC | Age: 54
End: 2019-03-04

## 2019-03-04 NOTE — TELEPHONE ENCOUNTER
----- Message from Piedad Plata sent at 3/4/2019  9:24 AM CST -----  Contact: [facility]/ [place] / [phone] ext [phone]  They received a note stating that this patient was out of work from 2/15 to 3/20/19 due to illness. They need a more specific diagnosis.

## 2019-03-04 NOTE — TELEPHONE ENCOUNTER
----- Message from Piedad Plata sent at 3/4/2019  9:24 AM CST -----  Contact: [facility]/ [place] / [phone] ext [phone]  They received a note stating that this patient was out of work from 2/15 to 3/20/19 due to illness. They need a more specific diagnosis.

## 2019-03-11 ENCOUNTER — TELEPHONE (OUTPATIENT)
Dept: INTERNAL MEDICINE | Facility: CLINIC | Age: 54
End: 2019-03-11

## 2019-03-11 NOTE — TELEPHONE ENCOUNTER
----- Message from Maria Munson sent at 3/11/2019  4:00 PM CDT -----  Contact:  [phone]  Patient was in on 02-. States Dr Najera put her off from work until 02- until 03-. Now she need something with the Diagnosis and Prognosis, due to her being on short term disability. Please call pt in regards to this.

## 2019-03-11 NOTE — TELEPHONE ENCOUNTER
----- Message from Maria Munson sent at 3/11/2019  4:00 PM CDT -----  Contact:  [phone]  Patient was in on 02-. States Dr Najera put her off from work until 02- until 03-. Now she need something with the Diagnosis and Prognosis, due to her being on short term disability. Please call pt in regards to this.

## 2019-03-13 ENCOUNTER — TELEPHONE (OUTPATIENT)
Dept: INTERNAL MEDICINE | Facility: CLINIC | Age: 54
End: 2019-03-13

## 2019-03-13 NOTE — TELEPHONE ENCOUNTER
----- Message from Hue Decker sent at 3/13/2019  3:59 PM CDT -----  Contact: -[phone]  Patient is requesting something with the Diagnosis and Prognosis, due to her being on short term disability. Please call pt in regards to this. Pt states this is an urgent matter. Please advise.

## 2019-03-13 NOTE — TELEPHONE ENCOUNTER
Paperwork was already sent for her and they need prognosis and diagnosis sent to them.  Please let her know tomorrow when this can be sent.    Thanks ashley

## 2019-03-13 NOTE — TELEPHONE ENCOUNTER
----- Message from Berenice Palacios sent at 3/13/2019  9:00 AM CDT -----  Contact: Pt [facility] [employer] [phone]   Patient is calling in regards to her wanting to  some prognosis and diagnosis paperwork. She would like a call back to find out if it's ready to be picked up.

## 2019-03-14 ENCOUNTER — TELEPHONE (OUTPATIENT)
Dept: INTERNAL MEDICINE | Facility: CLINIC | Age: 54
End: 2019-03-14

## 2019-03-14 NOTE — TELEPHONE ENCOUNTER
----- Message from Kristie Shafer sent at 3/14/2019 11:14 AM CDT -----  Contact: Patient [phone]  Pt states that she is calling to speak with Abby to verify as to facundoher or not she received a fax from [employer]. Please call back and advise.      thanks

## 2019-03-16 LAB
1,25(OH)2D SERPL-MCNC: 105 PG/ML (ref 18–72)
1,25(OH)2D2 SERPL-MCNC: <8 PG/ML
1,25(OH)2D3 SERPL-MCNC: 105 PG/ML
FERRITIN SERPL-MCNC: 8 NG/ML (ref 10–232)
IRON SATN MFR SERPL: 10 % (CALC) (ref 11–50)
IRON SERPL-MCNC: 45 MCG/DL (ref 45–160)
MAGNESIUM SERPL-MCNC: 2 MG/DL (ref 1.5–2.5)
TIBC SERPL-MCNC: 441 MCG/DL (CALC) (ref 250–450)
VIT B12 SERPL-MCNC: 380 PG/ML (ref 200–1100)

## 2019-03-18 ENCOUNTER — TELEPHONE (OUTPATIENT)
Dept: INTERNAL MEDICINE | Facility: CLINIC | Age: 54
End: 2019-03-18

## 2019-05-14 ENCOUNTER — TELEPHONE (OUTPATIENT)
Dept: INTERNAL MEDICINE | Facility: CLINIC | Age: 54
End: 2019-05-14

## 2019-05-14 DIAGNOSIS — Z00.00 ROUTINE GENERAL MEDICAL EXAMINATION AT A HEALTH CARE FACILITY: Primary | ICD-10-CM

## 2019-05-14 NOTE — TELEPHONE ENCOUNTER
----- Message from Berenice Palacios sent at 5/14/2019  3:11 PM CDT -----  Contact: Pt self Mobile/Home [phone]   Patient would like a call back in regards to her being scheduled on 05/17/2019 for a three month follow up. She said that she asked for her labs to be put in at [facility] and when she went to "[facility]" on Monday her lab orders were not in. Patient would like for you to put in some orders for her and have them sent to [facility] please.

## 2019-05-16 LAB
ALBUMIN SERPL-MCNC: 3.7 G/DL (ref 3.6–5.1)
ALBUMIN/CREAT UR: 2 MCG/MG CREAT
ALBUMIN/GLOB SERPL: 1.3 (CALC) (ref 1–2.5)
ALP SERPL-CCNC: 116 U/L (ref 33–130)
ALT SERPL-CCNC: 15 U/L (ref 6–29)
APPEARANCE UR: ABNORMAL
AST SERPL-CCNC: 14 U/L (ref 10–35)
BASOPHILS # BLD AUTO: 19 CELLS/UL (ref 0–200)
BASOPHILS NFR BLD AUTO: 0.4 %
BILIRUB SERPL-MCNC: 0.4 MG/DL (ref 0.2–1.2)
BILIRUB UR QL STRIP: NEGATIVE
BUN SERPL-MCNC: 11 MG/DL (ref 7–25)
BUN/CREAT SERPL: ABNORMAL (CALC) (ref 6–22)
CALCIUM SERPL-MCNC: 9 MG/DL (ref 8.6–10.4)
CHLORIDE SERPL-SCNC: 104 MMOL/L (ref 98–110)
CHOLEST SERPL-MCNC: 223 MG/DL
CHOLEST/HDLC SERPL: 3.5 (CALC)
CO2 SERPL-SCNC: 28 MMOL/L (ref 20–32)
COLOR UR: YELLOW
CREAT SERPL-MCNC: 0.76 MG/DL (ref 0.5–1.05)
CREAT UR-MCNC: 172 MG/DL (ref 20–275)
EOSINOPHIL # BLD AUTO: 202 CELLS/UL (ref 15–500)
EOSINOPHIL NFR BLD AUTO: 4.2 %
ERYTHROCYTE [DISTWIDTH] IN BLOOD BY AUTOMATED COUNT: 15.3 % (ref 11–15)
GFRSERPLBLD MDRD-ARVRAT: 89 ML/MIN/1.73M2
GLOBULIN SER CALC-MCNC: 2.9 G/DL (CALC) (ref 1.9–3.7)
GLUCOSE SERPL-MCNC: 192 MG/DL (ref 65–99)
GLUCOSE UR QL STRIP: NEGATIVE
HBA1C MFR BLD: 9.7 % OF TOTAL HGB
HCT VFR BLD AUTO: 33.9 % (ref 35–45)
HDLC SERPL-MCNC: 64 MG/DL
HGB BLD-MCNC: 10.7 G/DL (ref 11.7–15.5)
HGB UR QL STRIP: NEGATIVE
KETONES UR QL STRIP: ABNORMAL
LDLC SERPL CALC-MCNC: 139 MG/DL (CALC)
LEUKOCYTE ESTERASE UR QL STRIP: NEGATIVE
LYMPHOCYTES # BLD AUTO: 2410 CELLS/UL (ref 850–3900)
LYMPHOCYTES NFR BLD AUTO: 50.2 %
MCH RBC QN AUTO: 24.7 PG (ref 27–33)
MCHC RBC AUTO-ENTMCNC: 31.6 G/DL (ref 32–36)
MCV RBC AUTO: 78.3 FL (ref 80–100)
MICROALBUMIN UR-MCNC: 0.3 MG/DL
MONOCYTES # BLD AUTO: 302 CELLS/UL (ref 200–950)
MONOCYTES NFR BLD AUTO: 6.3 %
NEUTROPHILS # BLD AUTO: 1867 CELLS/UL (ref 1500–7800)
NEUTROPHILS NFR BLD AUTO: 38.9 %
NITRITE UR QL STRIP: POSITIVE
NONHDLC SERPL-MCNC: 159 MG/DL (CALC)
PH UR STRIP: ABNORMAL [PH] (ref 5–8)
PLATELET # BLD AUTO: 451 THOUSAND/UL (ref 140–400)
PMV BLD REES-ECKER: 9.9 FL (ref 7.5–12.5)
POTASSIUM SERPL-SCNC: 4.3 MMOL/L (ref 3.5–5.3)
PROT SERPL-MCNC: 6.6 G/DL (ref 6.1–8.1)
PROT UR QL STRIP: NEGATIVE
RBC # BLD AUTO: 4.33 MILLION/UL (ref 3.8–5.1)
SODIUM SERPL-SCNC: 140 MMOL/L (ref 135–146)
SP GR UR STRIP: 1.02 (ref 1–1.03)
TRIGL SERPL-MCNC: 92 MG/DL
TSH SERPL-ACNC: 1.65 MIU/L
WBC # BLD AUTO: 4.8 THOUSAND/UL (ref 3.8–10.8)

## 2019-05-17 ENCOUNTER — TELEPHONE (OUTPATIENT)
Dept: INTERNAL MEDICINE | Facility: CLINIC | Age: 54
End: 2019-05-17

## 2019-05-17 ENCOUNTER — OFFICE VISIT (OUTPATIENT)
Dept: INTERNAL MEDICINE | Facility: CLINIC | Age: 54
End: 2019-05-17
Payer: COMMERCIAL

## 2019-05-17 VITALS
DIASTOLIC BLOOD PRESSURE: 82 MMHG | HEART RATE: 78 BPM | TEMPERATURE: 99 F | RESPIRATION RATE: 18 BRPM | HEIGHT: 62 IN | BODY MASS INDEX: 35.41 KG/M2 | WEIGHT: 192.44 LBS | OXYGEN SATURATION: 98 % | SYSTOLIC BLOOD PRESSURE: 150 MMHG

## 2019-05-17 DIAGNOSIS — D64.9 ANEMIA, UNSPECIFIED TYPE: ICD-10-CM

## 2019-05-17 DIAGNOSIS — E11.9 TYPE 2 DIABETES MELLITUS WITHOUT COMPLICATION, WITHOUT LONG-TERM CURRENT USE OF INSULIN: Primary | Chronic | ICD-10-CM

## 2019-05-17 DIAGNOSIS — I10 ESSENTIAL HYPERTENSION: Chronic | ICD-10-CM

## 2019-05-17 DIAGNOSIS — E66.01 CLASS 2 SEVERE OBESITY WITH SERIOUS COMORBIDITY AND BODY MASS INDEX (BMI) OF 35.0 TO 35.9 IN ADULT, UNSPECIFIED OBESITY TYPE: ICD-10-CM

## 2019-05-17 DIAGNOSIS — E78.5 HYPERLIPIDEMIA, UNSPECIFIED HYPERLIPIDEMIA TYPE: ICD-10-CM

## 2019-05-17 PROCEDURE — 3046F HEMOGLOBIN A1C LEVEL >9.0%: CPT | Mod: CPTII,S$GLB,, | Performed by: INTERNAL MEDICINE

## 2019-05-17 PROCEDURE — 3079F DIAST BP 80-89 MM HG: CPT | Mod: CPTII,S$GLB,, | Performed by: INTERNAL MEDICINE

## 2019-05-17 PROCEDURE — 3046F PR MOST RECENT HEMOGLOBIN A1C LEVEL > 9.0%: ICD-10-PCS | Mod: CPTII,S$GLB,, | Performed by: INTERNAL MEDICINE

## 2019-05-17 PROCEDURE — 3008F BODY MASS INDEX DOCD: CPT | Mod: CPTII,S$GLB,, | Performed by: INTERNAL MEDICINE

## 2019-05-17 PROCEDURE — 3079F PR MOST RECENT DIASTOLIC BLOOD PRESSURE 80-89 MM HG: ICD-10-PCS | Mod: CPTII,S$GLB,, | Performed by: INTERNAL MEDICINE

## 2019-05-17 PROCEDURE — 3008F PR BODY MASS INDEX (BMI) DOCUMENTED: ICD-10-PCS | Mod: CPTII,S$GLB,, | Performed by: INTERNAL MEDICINE

## 2019-05-17 PROCEDURE — 99999 PR PBB SHADOW E&M-EST. PATIENT-LVL III: ICD-10-PCS | Mod: PBBFAC,,, | Performed by: INTERNAL MEDICINE

## 2019-05-17 PROCEDURE — 99999 PR PBB SHADOW E&M-EST. PATIENT-LVL III: CPT | Mod: PBBFAC,,, | Performed by: INTERNAL MEDICINE

## 2019-05-17 PROCEDURE — 3077F SYST BP >= 140 MM HG: CPT | Mod: CPTII,S$GLB,, | Performed by: INTERNAL MEDICINE

## 2019-05-17 PROCEDURE — 99214 OFFICE O/P EST MOD 30 MIN: CPT | Mod: S$GLB,,, | Performed by: INTERNAL MEDICINE

## 2019-05-17 PROCEDURE — 99214 PR OFFICE/OUTPT VISIT, EST, LEVL IV, 30-39 MIN: ICD-10-PCS | Mod: S$GLB,,, | Performed by: INTERNAL MEDICINE

## 2019-05-17 PROCEDURE — 3077F PR MOST RECENT SYSTOLIC BLOOD PRESSURE >= 140 MM HG: ICD-10-PCS | Mod: CPTII,S$GLB,, | Performed by: INTERNAL MEDICINE

## 2019-05-17 RX ORDER — AMLODIPINE BESYLATE 5 MG/1
5 TABLET ORAL DAILY
Qty: 30 TABLET | Refills: 6 | Status: SHIPPED | OUTPATIENT
Start: 2019-05-17 | End: 2019-11-19 | Stop reason: SDUPTHER

## 2019-05-17 RX ORDER — METFORMIN HYDROCHLORIDE 750 MG/1
1500 TABLET, EXTENDED RELEASE ORAL
Qty: 60 TABLET | Refills: 11 | Status: SHIPPED | OUTPATIENT
Start: 2019-05-17 | End: 2019-11-19 | Stop reason: SDUPTHER

## 2019-05-17 NOTE — PROGRESS NOTES
Subjective:       Patient ID: Sharron Shafer is a 53 y.o. female.    Chief Complaint: Follow-up (DM II)    HPI   The patient presents for follow-up of medical conditions which include type 2 diabetes mellitus, hypertension, iron deficiency anemia.  She is status post gastric bypass in 2007.  In addition she is being treated for hyperlipidemia and osteoarthritis of the knees.  Blood sugar levels have been in the 200+ range while fasting.  Postprandial blood sugar values range from 135-146.  No hypoglycemic symptoms have been noted.    Review of Systems   Constitutional: Negative for activity change, appetite change and unexpected weight change.   Eyes: Negative for visual disturbance.   Respiratory: Negative for shortness of breath.    Cardiovascular: Negative for chest pain, palpitations and leg swelling.   Gastrointestinal: Negative for abdominal pain, blood in stool and diarrhea.   Genitourinary: Negative for dysuria, frequency, hematuria and urgency.   Neurological: Negative for weakness, numbness and headaches.   Psychiatric/Behavioral: Negative for sleep disturbance.       Objective:      Physical Exam   Constitutional: She is oriented to person, place, and time. She appears well-developed and well-nourished. No distress.   The patient has lost 3 lb since 02/15/2019.   HENT:   Head: Normocephalic and atraumatic.   Eyes: Pupils are equal, round, and reactive to light. Conjunctivae and EOM are normal. No scleral icterus.   Neck: Normal range of motion. Neck supple. No JVD present. No thyromegaly present.   Cardiovascular: Normal rate, regular rhythm, normal heart sounds and intact distal pulses. Exam reveals no gallop and no friction rub.   No murmur heard.  Pulmonary/Chest: Effort normal and breath sounds normal. No respiratory distress. She has no wheezes. She has no rales.   Abdominal: Soft. Bowel sounds are normal. She exhibits no mass. There is no tenderness.   Musculoskeletal: Normal range of motion. She  exhibits no edema or tenderness.   Lymphadenopathy:     She has no cervical adenopathy.   Neurological: She is alert and oriented to person, place, and time. No cranial nerve deficit.   Sensory exam is intact in both feet on monofilament and vibration testing.   Skin: Skin is warm and dry. No rash noted.   No foot lesions are present.   Psychiatric: She has a normal mood and affect. Her behavior is normal.   Nursing note and vitals reviewed.      Results for orders placed or performed in visit on 05/14/19   CBC auto differential   Result Value Ref Range    WBC 4.8 3.8 - 10.8 Thousand/uL    RBC 4.33 3.80 - 5.10 Million/uL    Hemoglobin 10.7 (L) 11.7 - 15.5 g/dL    Hematocrit 33.9 (L) 35.0 - 45.0 %    Mean Corpuscular Volume 78.3 (L) 80.0 - 100.0 fL    Mean Corpuscular Hemoglobin 24.7 (L) 27.0 - 33.0 pg    Mean Corpuscular Hemoglobin Conc 31.6 (L) 32.0 - 36.0 g/dL    RDW 15.3 (H) 11.0 - 15.0 %    Platelets 451 (H) 140 - 400 Thousand/uL    MPV 9.9 7.5 - 12.5 fL    Neutrophils Absolute 1,867 1,500 - 7,800 cells/uL    Lymph # 2,410 850 - 3,900 cells/uL    Mono # 302 200 - 950 cells/uL    Eos # 202 15 - 500 cells/uL    Baso # 19 0 - 200 cells/uL    Neutrophils Relative 38.9 %    Lymph% 50.2 %    Mono% 6.3 %    Eosinophil% 4.2 %    Basophil% 0.4 %   Comprehensive metabolic panel   Result Value Ref Range    Glucose 192 (H) 65 - 99 mg/dL    BUN, Bld 11 7 - 25 mg/dL    Creatinine 0.76 0.50 - 1.05 mg/dL    eGFR if non  89 > OR = 60 mL/min/1.73m2    eGFR if African American 103 > OR = 60 mL/min/1.73m2    BUN/Creatinine Ratio NOT APPLICABLE 6 - 22 (calc)    Sodium 140 135 - 146 mmol/L    Potassium 4.3 3.5 - 5.3 mmol/L    Chloride 104 98 - 110 mmol/L    CO2 28 20 - 32 mmol/L    Calcium 9.0 8.6 - 10.4 mg/dL    Total Protein 6.6 6.1 - 8.1 g/dL    Albumin 3.7 3.6 - 5.1 g/dL    Globulin, Total 2.9 1.9 - 3.7 g/dL (calc)    Albumin/Globulin Ratio 1.3 1.0 - 2.5 (calc)    Total Bilirubin 0.4 0.2 - 1.2 mg/dL    Alkaline  Phosphatase 116 33 - 130 U/L    AST 14 10 - 35 U/L    ALT 15 6 - 29 U/L   Lipid panel   Result Value Ref Range    Cholesterol 223 (H) <200 mg/dL    HDL 64 >50 mg/dL    Triglycerides 92 <150 mg/dL    LDL Cholesterol 139 (H) mg/dL (calc)    Hdl/Cholesterol Ratio 3.5 <5.0 (calc)    Non HDL Chol. (LDL+VLDL) 159 (H) <130 mg/dL (calc)   TSH   Result Value Ref Range    TSH 1.65 mIU/L   Urinalysis   Result Value Ref Range    Color, UA YELLOW YELLOW    Appearance, UA CLOUDY (A) CLEAR    Specific Gravity, UA 1.020 1.001 - 1.035    pH, UA < OR = 5.0 5.0 - 8.0    Glucose, UA NEGATIVE NEGATIVE    Bilirubin, UA NEGATIVE NEGATIVE    Ketones, UA TRACE (A) NEGATIVE    Occult Blood UA NEGATIVE NEGATIVE    Protein, UA NEGATIVE NEGATIVE    Nitrite, UA POSITIVE (A) NEGATIVE    Leukocytes, UA NEGATIVE NEGATIVE   Hemoglobin A1c   Result Value Ref Range    Hemoglobin A1C 9.7 (H) <5.7 % of total Hgb   Microalbumin/creatinine urine ratio   Result Value Ref Range    Creatinine, Random Ur 172 20 - 275 mg/dL    Microalb, Ur 0.3 See Note: mg/dL    Microalb Creat Ratio 2 <30 mcg/mg creat       Assessment:       1. Type 2 diabetes mellitus without complication, without long-term current use of insulin    2. Class 2 severe obesity with serious comorbidity and body mass index (BMI) of 35.0 to 35.9 in adult, unspecified obesity type    3. Essential hypertension    4. Hyperlipidemia, unspecified hyperlipidemia type    5. Anemia, unspecified type        Plan:     Sharron was seen today for follow-up.  Blood tests will be obtained and [facility] labs in 3 months (hemoglobin A1c, CMP, CBC).  Fergon 325 mg daily is recommended for treatment of anemia.  Amlodipine will be added for better blood pressure control.  The dose of Glucophage- mg will be increased to 2 tablets every morning.  Diet therapy was discussed.  The patient is to return to clinic in 3 months.    Diagnoses and all orders for this visit:    Type 2 diabetes mellitus without complication,  without long-term current use of insulin  -     Cancel: Comprehensive metabolic panel; Future  -     Cancel: Hemoglobin A1c; Future  -     Cancel: CBC auto differential; Future  -     Comprehensive metabolic panel; Future  -     Hemoglobin A1c; Future  -     CBC auto differential; Future  -     Comprehensive metabolic panel  -     Hemoglobin A1c  -     CBC auto differential    Class 2 severe obesity with serious comorbidity and body mass index (BMI) of 35.0 to 35.9 in adult, unspecified obesity type    Essential hypertension  -     Cancel: Comprehensive metabolic panel; Future  -     Cancel: Hemoglobin A1c; Future  -     Cancel: CBC auto differential; Future  -     Comprehensive metabolic panel; Future  -     Hemoglobin A1c; Future  -     CBC auto differential; Future  -     Comprehensive metabolic panel  -     Hemoglobin A1c  -     CBC auto differential    Hyperlipidemia, unspecified hyperlipidemia type    Anemia, unspecified type  -     Cancel: Comprehensive metabolic panel; Future  -     Cancel: Hemoglobin A1c; Future  -     Cancel: CBC auto differential; Future  -     Comprehensive metabolic panel; Future  -     Hemoglobin A1c; Future  -     CBC auto differential; Future  -     Comprehensive metabolic panel  -     Hemoglobin A1c  -     CBC auto differential    Other orders  -     amLODIPine (NORVASC) 5 MG tablet; Take 1 tablet (5 mg total) by mouth once daily. For blood pressure  -     metFORMIN (GLUCOPHAGE-XR) 750 MG 24 hr tablet; Take 2 tablets (1,500 mg total) by mouth daily with breakfast.

## 2019-07-25 ENCOUNTER — TELEPHONE (OUTPATIENT)
Dept: INTERNAL MEDICINE | Facility: CLINIC | Age: 54
End: 2019-07-25

## 2019-07-25 NOTE — TELEPHONE ENCOUNTER
----- Message from Lizbeth Peralta sent at 7/25/2019  2:49 PM CDT -----  Contact: pt [phone]  Per pt missed work for physical therapy and requesting doctor's note to return.  Pt did not give dates that she missed. Please advise

## 2019-08-02 ENCOUNTER — TELEPHONE (OUTPATIENT)
Dept: INTERNAL MEDICINE | Facility: CLINIC | Age: 54
End: 2019-08-02

## 2019-08-02 NOTE — TELEPHONE ENCOUNTER
----- Message from Piedad Plata sent at 8/2/2019  8:14 AM CDT -----  Contact: patient [phone]  Patient called to say that she was at work but would not be allowed to work until the letter was sent from . Patient was advised that Abby would fax it as soon as  had it ready to send .

## 2019-08-14 LAB
ALBUMIN SERPL-MCNC: 3.8 G/DL (ref 3.6–5.1)
ALBUMIN/GLOB SERPL: 1.3 (CALC) (ref 1–2.5)
ALP SERPL-CCNC: 128 U/L (ref 33–130)
ALT SERPL-CCNC: 17 U/L (ref 6–29)
AST SERPL-CCNC: 15 U/L (ref 10–35)
BASOPHILS # BLD AUTO: 28 CELLS/UL (ref 0–200)
BASOPHILS NFR BLD AUTO: 0.6 %
BILIRUB SERPL-MCNC: 0.4 MG/DL (ref 0.2–1.2)
BUN SERPL-MCNC: 9 MG/DL (ref 7–25)
BUN/CREAT SERPL: ABNORMAL (CALC) (ref 6–22)
CALCIUM SERPL-MCNC: 9.3 MG/DL (ref 8.6–10.4)
CHLORIDE SERPL-SCNC: 107 MMOL/L (ref 98–110)
CO2 SERPL-SCNC: 29 MMOL/L (ref 20–32)
CREAT SERPL-MCNC: 0.87 MG/DL (ref 0.5–1.05)
EOSINOPHIL # BLD AUTO: 207 CELLS/UL (ref 15–500)
EOSINOPHIL NFR BLD AUTO: 4.4 %
ERYTHROCYTE [DISTWIDTH] IN BLOOD BY AUTOMATED COUNT: 15.8 % (ref 11–15)
GFRSERPLBLD MDRD-ARVRAT: 75 ML/MIN/1.73M2
GLOBULIN SER CALC-MCNC: 3 G/DL (CALC) (ref 1.9–3.7)
GLUCOSE SERPL-MCNC: 178 MG/DL (ref 65–99)
HBA1C MFR BLD: 9.5 % OF TOTAL HGB
HCT VFR BLD AUTO: 34.2 % (ref 35–45)
HGB BLD-MCNC: 10.5 G/DL (ref 11.7–15.5)
LYMPHOCYTES # BLD AUTO: 2092 CELLS/UL (ref 850–3900)
LYMPHOCYTES NFR BLD AUTO: 44.5 %
MCH RBC QN AUTO: 24.3 PG (ref 27–33)
MCHC RBC AUTO-ENTMCNC: 30.7 G/DL (ref 32–36)
MCV RBC AUTO: 79.2 FL (ref 80–100)
MONOCYTES # BLD AUTO: 287 CELLS/UL (ref 200–950)
MONOCYTES NFR BLD AUTO: 6.1 %
NEUTROPHILS # BLD AUTO: 2087 CELLS/UL (ref 1500–7800)
NEUTROPHILS NFR BLD AUTO: 44.4 %
PLATELET # BLD AUTO: 358 THOUSAND/UL (ref 140–400)
PMV BLD REES-ECKER: 9.7 FL (ref 7.5–12.5)
POTASSIUM SERPL-SCNC: 4.1 MMOL/L (ref 3.5–5.3)
PROT SERPL-MCNC: 6.8 G/DL (ref 6.1–8.1)
RBC # BLD AUTO: 4.32 MILLION/UL (ref 3.8–5.1)
SODIUM SERPL-SCNC: 142 MMOL/L (ref 135–146)
WBC # BLD AUTO: 4.7 THOUSAND/UL (ref 3.8–10.8)

## 2019-08-19 ENCOUNTER — OFFICE VISIT (OUTPATIENT)
Dept: INTERNAL MEDICINE | Facility: CLINIC | Age: 54
End: 2019-08-19
Payer: COMMERCIAL

## 2019-08-19 VITALS
RESPIRATION RATE: 15 BRPM | DIASTOLIC BLOOD PRESSURE: 76 MMHG | SYSTOLIC BLOOD PRESSURE: 116 MMHG | BODY MASS INDEX: 35.16 KG/M2 | WEIGHT: 192.25 LBS | TEMPERATURE: 99 F | OXYGEN SATURATION: 97 % | HEART RATE: 86 BPM

## 2019-08-19 DIAGNOSIS — I10 ESSENTIAL HYPERTENSION: Chronic | ICD-10-CM

## 2019-08-19 DIAGNOSIS — M17.0 PRIMARY OSTEOARTHRITIS OF BOTH KNEES: ICD-10-CM

## 2019-08-19 DIAGNOSIS — E11.9 TYPE 2 DIABETES MELLITUS WITHOUT COMPLICATION, WITHOUT LONG-TERM CURRENT USE OF INSULIN: Primary | Chronic | ICD-10-CM

## 2019-08-19 DIAGNOSIS — D50.9 IRON DEFICIENCY ANEMIA, UNSPECIFIED IRON DEFICIENCY ANEMIA TYPE: ICD-10-CM

## 2019-08-19 DIAGNOSIS — E78.5 HYPERLIPIDEMIA, UNSPECIFIED HYPERLIPIDEMIA TYPE: ICD-10-CM

## 2019-08-19 PROCEDURE — 3046F HEMOGLOBIN A1C LEVEL >9.0%: CPT | Mod: CPTII,S$GLB,, | Performed by: INTERNAL MEDICINE

## 2019-08-19 PROCEDURE — 99999 PR PBB SHADOW E&M-EST. PATIENT-LVL III: CPT | Mod: PBBFAC,,, | Performed by: INTERNAL MEDICINE

## 2019-08-19 PROCEDURE — 99214 PR OFFICE/OUTPT VISIT, EST, LEVL IV, 30-39 MIN: ICD-10-PCS | Mod: S$GLB,,, | Performed by: INTERNAL MEDICINE

## 2019-08-19 PROCEDURE — 99214 OFFICE O/P EST MOD 30 MIN: CPT | Mod: S$GLB,,, | Performed by: INTERNAL MEDICINE

## 2019-08-19 PROCEDURE — 3078F DIAST BP <80 MM HG: CPT | Mod: CPTII,S$GLB,, | Performed by: INTERNAL MEDICINE

## 2019-08-19 PROCEDURE — 3008F PR BODY MASS INDEX (BMI) DOCUMENTED: ICD-10-PCS | Mod: CPTII,S$GLB,, | Performed by: INTERNAL MEDICINE

## 2019-08-19 PROCEDURE — 3074F SYST BP LT 130 MM HG: CPT | Mod: CPTII,S$GLB,, | Performed by: INTERNAL MEDICINE

## 2019-08-19 PROCEDURE — 3074F PR MOST RECENT SYSTOLIC BLOOD PRESSURE < 130 MM HG: ICD-10-PCS | Mod: CPTII,S$GLB,, | Performed by: INTERNAL MEDICINE

## 2019-08-19 PROCEDURE — 3046F PR MOST RECENT HEMOGLOBIN A1C LEVEL > 9.0%: ICD-10-PCS | Mod: CPTII,S$GLB,, | Performed by: INTERNAL MEDICINE

## 2019-08-19 PROCEDURE — 3008F BODY MASS INDEX DOCD: CPT | Mod: CPTII,S$GLB,, | Performed by: INTERNAL MEDICINE

## 2019-08-19 PROCEDURE — 3078F PR MOST RECENT DIASTOLIC BLOOD PRESSURE < 80 MM HG: ICD-10-PCS | Mod: CPTII,S$GLB,, | Performed by: INTERNAL MEDICINE

## 2019-08-19 PROCEDURE — 99999 PR PBB SHADOW E&M-EST. PATIENT-LVL III: ICD-10-PCS | Mod: PBBFAC,,, | Performed by: INTERNAL MEDICINE

## 2019-08-19 NOTE — PROGRESS NOTES
Subjective:       Patient ID: Sharron Shafer is a 54 y.o. female.    Chief Complaint: Follow-up and Diabetes    HPI   The patient presents for follow-up of medical conditions which include type 2 diabetes mellitus, hypertension, hyperlipidemia, and iron deficiency anemia.  She also has osteoarthritis of the knees.  He has been trying to increase her activity level.  The dose of Glucophage-XR was increased to 1500 mg daily at her last office visit.  Amlodipine was also added for better blood pressure control.  She states blood sugar levels have decreased from > 200 down to 153-175.  She has also decreased her carbohydrate intake.  She has not experienced any adverse effects from the Glucophage.    She is tolerating her blood pressure medication well without side effects.    Review of Systems   Constitutional: Negative for activity change, appetite change and unexpected weight change.   Eyes: Negative for visual disturbance.   Respiratory: Negative for shortness of breath.    Cardiovascular: Negative for chest pain, palpitations and leg swelling.   Gastrointestinal: Negative for abdominal pain, blood in stool and diarrhea.   Genitourinary: Negative for dysuria, frequency, hematuria and urgency.   Musculoskeletal: Positive for arthralgias.   Neurological: Negative for weakness, numbness and headaches.   Psychiatric/Behavioral: Negative for sleep disturbance.       Objective:      Physical Exam   Constitutional: She is oriented to person, place, and time. She appears well-developed and well-nourished. No distress.   HENT:   Head: Normocephalic and atraumatic.   Eyes: Pupils are equal, round, and reactive to light. Conjunctivae and EOM are normal. No scleral icterus.   Neck: Normal range of motion. Neck supple. No JVD present. No thyromegaly present.   Cardiovascular: Normal rate, regular rhythm, normal heart sounds and intact distal pulses. Exam reveals no gallop and no friction rub.   No murmur  heard.  Pulmonary/Chest: Effort normal and breath sounds normal. No respiratory distress. She has no wheezes. She has no rales.   Abdominal: Soft. Bowel sounds are normal. She exhibits no mass. There is no tenderness.   Musculoskeletal: Normal range of motion. She exhibits no edema or tenderness.   Bilateral knee crepitus is present.  Decreased flexion is noted bilaterally. No effusions are appreciated.   Lymphadenopathy:     She has no cervical adenopathy.   Neurological: She is alert and oriented to person, place, and time. No cranial nerve deficit.   Sensory exam is intact in both feet on monofilament and vibration testing.   Skin: Skin is warm and dry. No rash noted.   No foot lesions are present.   Psychiatric: She has a normal mood and affect. Her behavior is normal.   Nursing note and vitals reviewed.      Results for orders placed or performed in visit on 05/17/19   Comprehensive metabolic panel   Result Value Ref Range    Glucose 178 (H) 65 - 99 mg/dL    BUN, Bld 9 7 - 25 mg/dL    Creatinine 0.87 0.50 - 1.05 mg/dL    eGFR if non  75 > OR = 60 mL/min/1.73m2    eGFR if African American 87 > OR = 60 mL/min/1.73m2    BUN/Creatinine Ratio NOT APPLICABLE 6 - 22 (calc)    Sodium 142 135 - 146 mmol/L    Potassium 4.1 3.5 - 5.3 mmol/L    Chloride 107 98 - 110 mmol/L    CO2 29 20 - 32 mmol/L    Calcium 9.3 8.6 - 10.4 mg/dL    Total Protein 6.8 6.1 - 8.1 g/dL    Albumin 3.8 3.6 - 5.1 g/dL    Globulin, Total 3.0 1.9 - 3.7 g/dL (calc)    Albumin/Globulin Ratio 1.3 1.0 - 2.5 (calc)    Total Bilirubin 0.4 0.2 - 1.2 mg/dL    Alkaline Phosphatase 128 33 - 130 U/L    AST 15 10 - 35 U/L    ALT 17 6 - 29 U/L   Hemoglobin A1c   Result Value Ref Range    Hemoglobin A1C 9.5 (H) <5.7 % of total Hgb   CBC auto differential   Result Value Ref Range    WBC 4.7 3.8 - 10.8 Thousand/uL    RBC 4.32 3.80 - 5.10 Million/uL    Hemoglobin 10.5 (L) 11.7 - 15.5 g/dL    Hematocrit 34.2 (L) 35.0 - 45.0 %    Mean Corpuscular  Volume 79.2 (L) 80.0 - 100.0 fL    Mean Corpuscular Hemoglobin 24.3 (L) 27.0 - 33.0 pg    Mean Corpuscular Hemoglobin Conc 30.7 (L) 32.0 - 36.0 g/dL    RDW 15.8 (H) 11.0 - 15.0 %    Platelets 358 140 - 400 Thousand/uL    MPV 9.7 7.5 - 12.5 fL    Neutrophils Absolute 2,087 1,500 - 7,800 cells/uL    Lymph # 2,092 850 - 3,900 cells/uL    Mono # 287 200 - 950 cells/uL    Eos # 207 15 - 500 cells/uL    Baso # 28 0 - 200 cells/uL    Neutrophils Relative 44.4 %    Lymph% 44.5 %    Mono% 6.1 %    Eosinophil% 4.4 %    Basophil% 0.6 %       Assessment:       1. Type 2 diabetes mellitus without complication, without long-term current use of insulin    2. Essential hypertension    3. Hyperlipidemia, unspecified hyperlipidemia type    4. Primary osteoarthritis of both knees    5. Iron deficiency anemia, unspecified iron deficiency anemia type        Plan:       Sharron was seen today for follow-up and diabetes.  Current blood pressure medications will be continued.  Januvia will be added for better blood sugar control.  The patient should continue metformin therapy.  Blood tests and follow-up visit in 3 months will be obtained.    Diagnoses and all orders for this visit:    Type 2 diabetes mellitus without complication, without long-term current use of insulin  -     Hemoglobin A1c; Future    Essential hypertension  -     CBC auto differential; Future    Hyperlipidemia, unspecified hyperlipidemia type  -     Comprehensive metabolic panel; Future  -     Lipid panel; Future    Primary osteoarthritis of both knees    Iron deficiency anemia, unspecified iron deficiency anemia type    Other orders  -     SITagliptin (JANUVIA) 50 MG Tab; Take 1 tablet (50 mg total) by mouth once daily. For diabetes control

## 2019-11-15 ENCOUNTER — LAB VISIT (OUTPATIENT)
Dept: LAB | Facility: HOSPITAL | Age: 54
End: 2019-11-15
Attending: INTERNAL MEDICINE
Payer: MEDICAID

## 2019-11-15 DIAGNOSIS — E11.9 TYPE 2 DIABETES MELLITUS WITHOUT COMPLICATION, WITHOUT LONG-TERM CURRENT USE OF INSULIN: Chronic | ICD-10-CM

## 2019-11-15 DIAGNOSIS — I10 ESSENTIAL HYPERTENSION: Chronic | ICD-10-CM

## 2019-11-15 DIAGNOSIS — E78.5 HYPERLIPIDEMIA, UNSPECIFIED HYPERLIPIDEMIA TYPE: ICD-10-CM

## 2019-11-15 LAB
ALBUMIN SERPL BCP-MCNC: 3.3 G/DL (ref 3.5–5.2)
ALP SERPL-CCNC: 128 U/L (ref 55–135)
ALT SERPL W/O P-5'-P-CCNC: 15 U/L (ref 10–44)
ANION GAP SERPL CALC-SCNC: 9 MMOL/L (ref 8–16)
AST SERPL-CCNC: 15 U/L (ref 10–40)
BASOPHILS # BLD AUTO: 0.03 K/UL (ref 0–0.2)
BASOPHILS NFR BLD: 0.6 % (ref 0–1.9)
BILIRUB SERPL-MCNC: 0.4 MG/DL (ref 0.1–1)
BUN SERPL-MCNC: 12 MG/DL (ref 6–20)
CALCIUM SERPL-MCNC: 9 MG/DL (ref 8.7–10.5)
CHLORIDE SERPL-SCNC: 103 MMOL/L (ref 95–110)
CHOLEST SERPL-MCNC: 239 MG/DL (ref 120–199)
CHOLEST/HDLC SERPL: 3.9 {RATIO} (ref 2–5)
CO2 SERPL-SCNC: 26 MMOL/L (ref 23–29)
CREAT SERPL-MCNC: 0.9 MG/DL (ref 0.5–1.4)
DIFFERENTIAL METHOD: ABNORMAL
EOSINOPHIL # BLD AUTO: 0.2 K/UL (ref 0–0.5)
EOSINOPHIL NFR BLD: 4.2 % (ref 0–8)
ERYTHROCYTE [DISTWIDTH] IN BLOOD BY AUTOMATED COUNT: 16.5 % (ref 11.5–14.5)
EST. GFR  (AFRICAN AMERICAN): >60 ML/MIN/1.73 M^2
EST. GFR  (NON AFRICAN AMERICAN): >60 ML/MIN/1.73 M^2
ESTIMATED AVG GLUCOSE: 240 MG/DL (ref 68–131)
GLUCOSE SERPL-MCNC: 237 MG/DL (ref 70–110)
HBA1C MFR BLD HPLC: 10 % (ref 4–5.6)
HCT VFR BLD AUTO: 34.7 % (ref 37–48.5)
HDLC SERPL-MCNC: 61 MG/DL (ref 40–75)
HDLC SERPL: 25.5 % (ref 20–50)
HGB BLD-MCNC: 10.5 G/DL (ref 12–16)
IMM GRANULOCYTES # BLD AUTO: 0.01 K/UL (ref 0–0.04)
IMM GRANULOCYTES NFR BLD AUTO: 0.2 % (ref 0–0.5)
LDLC SERPL CALC-MCNC: 152.6 MG/DL (ref 63–159)
LYMPHOCYTES # BLD AUTO: 2.3 K/UL (ref 1–4.8)
LYMPHOCYTES NFR BLD: 43.7 % (ref 18–48)
MCH RBC QN AUTO: 24.9 PG (ref 27–31)
MCHC RBC AUTO-ENTMCNC: 30.3 G/DL (ref 32–36)
MCV RBC AUTO: 82 FL (ref 82–98)
MONOCYTES # BLD AUTO: 0.4 K/UL (ref 0.3–1)
MONOCYTES NFR BLD: 7.8 % (ref 4–15)
NEUTROPHILS # BLD AUTO: 2.3 K/UL (ref 1.8–7.7)
NEUTROPHILS NFR BLD: 43.5 % (ref 38–73)
NONHDLC SERPL-MCNC: 178 MG/DL
NRBC BLD-RTO: 0 /100 WBC
PLATELET # BLD AUTO: 381 K/UL (ref 150–350)
PMV BLD AUTO: 9.9 FL (ref 9.2–12.9)
POTASSIUM SERPL-SCNC: 4.1 MMOL/L (ref 3.5–5.1)
PROT SERPL-MCNC: 6.9 G/DL (ref 6–8.4)
RBC # BLD AUTO: 4.21 M/UL (ref 4–5.4)
SODIUM SERPL-SCNC: 138 MMOL/L (ref 136–145)
TRIGL SERPL-MCNC: 127 MG/DL (ref 30–150)
WBC # BLD AUTO: 5.26 K/UL (ref 3.9–12.7)

## 2019-11-15 PROCEDURE — 36415 COLL VENOUS BLD VENIPUNCTURE: CPT | Mod: PO

## 2019-11-15 PROCEDURE — 85025 COMPLETE CBC W/AUTO DIFF WBC: CPT

## 2019-11-15 PROCEDURE — 83036 HEMOGLOBIN GLYCOSYLATED A1C: CPT

## 2019-11-15 PROCEDURE — 80061 LIPID PANEL: CPT

## 2019-11-15 PROCEDURE — 80053 COMPREHEN METABOLIC PANEL: CPT

## 2019-11-19 ENCOUNTER — OFFICE VISIT (OUTPATIENT)
Dept: INTERNAL MEDICINE | Facility: CLINIC | Age: 54
End: 2019-11-19
Payer: MEDICAID

## 2019-11-19 VITALS
WEIGHT: 190.25 LBS | TEMPERATURE: 99 F | HEIGHT: 62 IN | DIASTOLIC BLOOD PRESSURE: 70 MMHG | HEART RATE: 100 BPM | BODY MASS INDEX: 35.01 KG/M2 | SYSTOLIC BLOOD PRESSURE: 138 MMHG | RESPIRATION RATE: 18 BRPM

## 2019-11-19 DIAGNOSIS — E11.9 TYPE 2 DIABETES MELLITUS WITHOUT COMPLICATION, WITHOUT LONG-TERM CURRENT USE OF INSULIN: Primary | Chronic | ICD-10-CM

## 2019-11-19 DIAGNOSIS — E78.49 OTHER HYPERLIPIDEMIA: ICD-10-CM

## 2019-11-19 DIAGNOSIS — I10 ESSENTIAL HYPERTENSION: Chronic | ICD-10-CM

## 2019-11-19 PROCEDURE — 90686 IIV4 VACC NO PRSV 0.5 ML IM: CPT | Mod: PBBFAC,PO

## 2019-11-19 PROCEDURE — 99214 OFFICE O/P EST MOD 30 MIN: CPT | Mod: PBBFAC,PO | Performed by: INTERNAL MEDICINE

## 2019-11-19 PROCEDURE — 99214 OFFICE O/P EST MOD 30 MIN: CPT | Mod: S$PBB,,, | Performed by: INTERNAL MEDICINE

## 2019-11-19 PROCEDURE — 99214 PR OFFICE/OUTPT VISIT, EST, LEVL IV, 30-39 MIN: ICD-10-PCS | Mod: S$PBB,,, | Performed by: INTERNAL MEDICINE

## 2019-11-19 PROCEDURE — 99999 PR PBB SHADOW E&M-EST. PATIENT-LVL IV: ICD-10-PCS | Mod: PBBFAC,,, | Performed by: INTERNAL MEDICINE

## 2019-11-19 PROCEDURE — 99999 PR PBB SHADOW E&M-EST. PATIENT-LVL IV: CPT | Mod: PBBFAC,,, | Performed by: INTERNAL MEDICINE

## 2019-11-19 RX ORDER — METFORMIN HYDROCHLORIDE 750 MG/1
1500 TABLET, EXTENDED RELEASE ORAL
Qty: 60 TABLET | Refills: 11 | Status: SHIPPED | OUTPATIENT
Start: 2019-11-19 | End: 2022-03-30

## 2019-11-19 RX ORDER — PRAVASTATIN SODIUM 40 MG/1
40 TABLET ORAL NIGHTLY
Qty: 30 TABLET | Refills: 11 | Status: SHIPPED | OUTPATIENT
Start: 2019-11-19 | End: 2020-02-20

## 2019-11-19 RX ORDER — LOSARTAN POTASSIUM AND HYDROCHLOROTHIAZIDE 12.5; 1 MG/1; MG/1
1 TABLET ORAL DAILY
Qty: 30 TABLET | Refills: 11 | Status: SHIPPED | OUTPATIENT
Start: 2019-11-19 | End: 2021-11-24

## 2019-11-19 RX ORDER — AMLODIPINE BESYLATE 5 MG/1
5 TABLET ORAL DAILY
Qty: 30 TABLET | Refills: 11 | Status: SHIPPED | OUTPATIENT
Start: 2019-11-19 | End: 2021-11-24

## 2019-11-19 NOTE — PROGRESS NOTES
Subjective:       Patient ID: Sharron Shafer is a 54 y.o. female.    Chief Complaint: Follow-up (4 Month)    HPI   The patient presents for follow-up of medical conditions which include type 2 diabetes mellitus, hypertension, and hyperlipidemia.  The patient states she has been doing well her diet is variable lately however.  She retired in September of this year.  She is gradually becoming adjusted to her new lifestyle.  She has not been on her medications.  Her insurance has changed.  She recently started walking treadmill for exercise.  She relates blood sugar levels have been higher than normal recently      Review of Systems   Constitutional: Negative for activity change, appetite change and unexpected weight change.   Eyes: Negative for visual disturbance.   Respiratory: Negative for shortness of breath.    Cardiovascular: Negative for palpitations and leg swelling.   Gastrointestinal: Negative for abdominal pain, blood in stool and diarrhea.   Genitourinary: Negative for dysuria, frequency, hematuria and urgency.   Neurological: Negative for numbness and headaches.   Psychiatric/Behavioral: Negative for sleep disturbance.       Objective:      Physical Exam   Constitutional: She is oriented to person, place, and time. She appears well-developed and well-nourished. No distress.   HENT:   Head: Normocephalic and atraumatic.   Eyes: Pupils are equal, round, and reactive to light. Conjunctivae and EOM are normal. No scleral icterus.   Neck: Normal range of motion. Neck supple. No JVD present. No thyromegaly present.   Cardiovascular: Normal rate, regular rhythm, normal heart sounds and intact distal pulses. Exam reveals no gallop and no friction rub.   No murmur heard.  Pulmonary/Chest: Effort normal and breath sounds normal. No respiratory distress. She has no wheezes. She has no rales.   Abdominal: Soft. Bowel sounds are normal. She exhibits no mass. There is no tenderness.   Musculoskeletal: Normal range of  motion. She exhibits no edema or tenderness.   Lymphadenopathy:     She has no cervical adenopathy.   Neurological: She is alert and oriented to person, place, and time. No cranial nerve deficit.   Sensory exam is intact in both feet on monofilament testing.   Skin: Skin is warm and dry. No rash noted.   No foot lesions are present.   Psychiatric: She has a normal mood and affect. Her behavior is normal.   Nursing note and vitals reviewed.      Results for orders placed or performed in visit on 11/15/19   CBC auto differential   Result Value Ref Range    WBC 5.26 3.90 - 12.70 K/uL    RBC 4.21 4.00 - 5.40 M/uL    Hemoglobin 10.5 (L) 12.0 - 16.0 g/dL    Hematocrit 34.7 (L) 37.0 - 48.5 %    Mean Corpuscular Volume 82 82 - 98 fL    Mean Corpuscular Hemoglobin 24.9 (L) 27.0 - 31.0 pg    Mean Corpuscular Hemoglobin Conc 30.3 (L) 32.0 - 36.0 g/dL    RDW 16.5 (H) 11.5 - 14.5 %    Platelets 381 (H) 150 - 350 K/uL    MPV 9.9 9.2 - 12.9 fL    Immature Granulocytes 0.2 0.0 - 0.5 %    Gran # (ANC) 2.3 1.8 - 7.7 K/uL    Immature Grans (Abs) 0.01 0.00 - 0.04 K/uL    Lymph # 2.3 1.0 - 4.8 K/uL    Mono # 0.4 0.3 - 1.0 K/uL    Eos # 0.2 0.0 - 0.5 K/uL    Baso # 0.03 0.00 - 0.20 K/uL    nRBC 0 0 /100 WBC    Gran% 43.5 38.0 - 73.0 %    Lymph% 43.7 18.0 - 48.0 %    Mono% 7.8 4.0 - 15.0 %    Eosinophil% 4.2 0.0 - 8.0 %    Basophil% 0.6 0.0 - 1.9 %    Differential Method Automated    Comprehensive metabolic panel   Result Value Ref Range    Sodium 138 136 - 145 mmol/L    Potassium 4.1 3.5 - 5.1 mmol/L    Chloride 103 95 - 110 mmol/L    CO2 26 23 - 29 mmol/L    Glucose 237 (H) 70 - 110 mg/dL    BUN, Bld 12 6 - 20 mg/dL    Creatinine 0.9 0.5 - 1.4 mg/dL    Calcium 9.0 8.7 - 10.5 mg/dL    Total Protein 6.9 6.0 - 8.4 g/dL    Albumin 3.3 (L) 3.5 - 5.2 g/dL    Total Bilirubin 0.4 0.1 - 1.0 mg/dL    Alkaline Phosphatase 128 55 - 135 U/L    AST 15 10 - 40 U/L    ALT 15 10 - 44 U/L    Anion Gap 9 8 - 16 mmol/L    eGFR if African American >60.0 >60  mL/min/1.73 m^2    eGFR if non African American >60.0 >60 mL/min/1.73 m^2   Lipid panel   Result Value Ref Range    Cholesterol 239 (H) 120 - 199 mg/dL    Triglycerides 127 30 - 150 mg/dL    HDL 61 40 - 75 mg/dL    LDL Cholesterol 152.6 63.0 - 159.0 mg/dL    Hdl/Cholesterol Ratio 25.5 20.0 - 50.0 %    Total Cholesterol/HDL Ratio 3.9 2.0 - 5.0    Non-HDL Cholesterol 178 mg/dL   Hemoglobin A1c   Result Value Ref Range    Hemoglobin A1C 10.0 (H) 4.0 - 5.6 %    Estimated Avg Glucose 240 (H) 68 - 131 mg/dL       Assessment:       1. Type 2 diabetes mellitus without complication, without long-term current use of insulin    2. Essential hypertension    3. Other hyperlipidemia        Plan:     Sharron was seen today for follow-up.  Diet therapy was discussed.  The patient should resume prescription medications.  Blood tests will be obtained in 3 months along with a follow-up visit.  Prescriptions were given for the Tdap and shingles vaccines for the patient to take to her pharmacy.  Influenza vaccine will be given today.    Diagnoses and all orders for this visit:    Type 2 diabetes mellitus without complication, without long-term current use of insulin  -     Comprehensive metabolic panel; Future  -     CBC auto differential; Future  -     Lipid panel; Future  -     Hemoglobin A1c; Future    Essential hypertension  -     Comprehensive metabolic panel; Future  -     CBC auto differential; Future  -     Lipid panel; Future  -     Hemoglobin A1c; Future    Other hyperlipidemia  -     Comprehensive metabolic panel; Future  -     CBC auto differential; Future  -     Lipid panel; Future  -     Hemoglobin A1c; Future    Other orders  -     amLODIPine (NORVASC) 5 MG tablet; Take 1 tablet (5 mg total) by mouth once daily. For blood pressure  -     losartan-hydrochlorothiazide 100-12.5 mg (HYZAAR) 100-12.5 mg Tab; Take 1 tablet by mouth once daily.  -     metFORMIN (GLUCOPHAGE-XR) 750 MG 24 hr tablet; Take 2 tablets (1,500 mg  total) by mouth daily with breakfast.  -     pravastatin (PRAVACHOL) 40 MG tablet; Take 1 tablet (40 mg total) by mouth nightly. For cholesterol control.  -     SITagliptin (JANUVIA) 50 MG Tab; Take 1 tablet (50 mg total) by mouth once daily. For diabetes control  -     Discontinue: ONETOUCH DELICA LANCETS 33 gauge Misc; U TO CHECK BLOOD SUGAR BID  -     blood sugar diagnostic Strp; To check BG 2 times daily, to use with insurance preferred meter  -     DIPH,PERTUSS,ACEL,,TET VAC,PF,, ADULT, (ADACEL,TDAP ADOLESN/ADULT,,PF,) 2 Lf-(2.5-5-3-5 mcg)-5Lf/0.5 mL injection; Inject 0.5 mLs into the muscle once. (To Pharmacist) for 1 dose  -     varicella-zoster [id number], PF, (SHINGRIX, PF,) 50 mcg/0.5 mL injection; Inject 0.5 mLs into the muscle once. for 1 dose  -     Influenza - Quadrivalent (PF)

## 2019-11-22 RX ORDER — LANCETS
EACH MISCELLANEOUS
Qty: 100 EACH | Refills: 3 | Status: SHIPPED | OUTPATIENT
Start: 2019-11-22 | End: 2020-06-30 | Stop reason: SDUPTHER

## 2019-11-22 RX ORDER — INSULIN PUMP SYRINGE, 3 ML
EACH MISCELLANEOUS
Qty: 1 EACH | Refills: 0 | Status: SHIPPED | OUTPATIENT
Start: 2019-11-22 | End: 2020-02-20

## 2020-01-22 DIAGNOSIS — Z12.31 BREAST CANCER SCREENING BY MAMMOGRAM: Primary | ICD-10-CM

## 2020-02-06 ENCOUNTER — HOSPITAL ENCOUNTER (OUTPATIENT)
Dept: RADIOLOGY | Facility: HOSPITAL | Age: 55
Discharge: HOME | End: 2020-02-06
Payer: MEDICAID

## 2020-02-06 ENCOUNTER — PATIENT OUTREACH (OUTPATIENT)
Dept: ADMINISTRATIVE | Facility: HOSPITAL | Age: 55
End: 2020-02-06

## 2020-02-06 DIAGNOSIS — Z12.31 BREAST CANCER SCREENING BY MAMMOGRAM: ICD-10-CM

## 2020-02-06 PROCEDURE — 77067 MAMMO DIGITAL SCREENING BILAT WITH TOMOSYNTHESIS_CAD: ICD-10-PCS | Mod: 26,,, | Performed by: RADIOLOGY

## 2020-02-06 PROCEDURE — 77063 MAMMO DIGITAL SCREENING BILAT WITH TOMOSYNTHESIS_CAD: ICD-10-PCS | Mod: 26,,, | Performed by: RADIOLOGY

## 2020-02-06 PROCEDURE — 77067 SCR MAMMO BI INCL CAD: CPT | Mod: 26,,, | Performed by: RADIOLOGY

## 2020-02-06 PROCEDURE — 77067 SCR MAMMO BI INCL CAD: CPT | Mod: TC,PO

## 2020-02-06 PROCEDURE — 77063 BREAST TOMOSYNTHESIS BI: CPT | Mod: 26,,, | Performed by: RADIOLOGY

## 2020-02-13 ENCOUNTER — LAB VISIT (OUTPATIENT)
Dept: LAB | Facility: HOSPITAL | Age: 55
End: 2020-02-13
Attending: INTERNAL MEDICINE
Payer: MEDICAID

## 2020-02-13 DIAGNOSIS — E78.49 OTHER HYPERLIPIDEMIA: ICD-10-CM

## 2020-02-13 DIAGNOSIS — E11.9 TYPE 2 DIABETES MELLITUS WITHOUT COMPLICATION, WITHOUT LONG-TERM CURRENT USE OF INSULIN: Chronic | ICD-10-CM

## 2020-02-13 DIAGNOSIS — I10 ESSENTIAL HYPERTENSION: Chronic | ICD-10-CM

## 2020-02-13 LAB
ALBUMIN SERPL BCP-MCNC: 3.5 G/DL (ref 3.5–5.2)
ALP SERPL-CCNC: 134 U/L (ref 55–135)
ALT SERPL W/O P-5'-P-CCNC: 14 U/L (ref 10–44)
ANION GAP SERPL CALC-SCNC: 9 MMOL/L (ref 8–16)
AST SERPL-CCNC: 17 U/L (ref 10–40)
BASOPHILS # BLD AUTO: 0.02 K/UL (ref 0–0.2)
BASOPHILS NFR BLD: 0.4 % (ref 0–1.9)
BILIRUB SERPL-MCNC: 0.5 MG/DL (ref 0.1–1)
BUN SERPL-MCNC: 12 MG/DL (ref 6–20)
CALCIUM SERPL-MCNC: 9 MG/DL (ref 8.7–10.5)
CHLORIDE SERPL-SCNC: 106 MMOL/L (ref 95–110)
CHOLEST SERPL-MCNC: 234 MG/DL (ref 120–199)
CHOLEST/HDLC SERPL: 4.1 {RATIO} (ref 2–5)
CO2 SERPL-SCNC: 23 MMOL/L (ref 23–29)
CREAT SERPL-MCNC: 0.9 MG/DL (ref 0.5–1.4)
DIFFERENTIAL METHOD: ABNORMAL
EOSINOPHIL # BLD AUTO: 0.2 K/UL (ref 0–0.5)
EOSINOPHIL NFR BLD: 4.6 % (ref 0–8)
ERYTHROCYTE [DISTWIDTH] IN BLOOD BY AUTOMATED COUNT: 17.4 % (ref 11.5–14.5)
EST. GFR  (AFRICAN AMERICAN): >60 ML/MIN/1.73 M^2
EST. GFR  (NON AFRICAN AMERICAN): >60 ML/MIN/1.73 M^2
GLUCOSE SERPL-MCNC: 207 MG/DL (ref 70–110)
HCT VFR BLD AUTO: 35.8 % (ref 37–48.5)
HDLC SERPL-MCNC: 57 MG/DL (ref 40–75)
HDLC SERPL: 24.4 % (ref 20–50)
HGB BLD-MCNC: 10.6 G/DL (ref 12–16)
IMM GRANULOCYTES # BLD AUTO: 0 K/UL (ref 0–0.04)
IMM GRANULOCYTES NFR BLD AUTO: 0 % (ref 0–0.5)
LDLC SERPL CALC-MCNC: 153.6 MG/DL (ref 63–159)
LYMPHOCYTES # BLD AUTO: 2.4 K/UL (ref 1–4.8)
LYMPHOCYTES NFR BLD: 46.3 % (ref 18–48)
MCH RBC QN AUTO: 24.7 PG (ref 27–31)
MCHC RBC AUTO-ENTMCNC: 29.6 G/DL (ref 32–36)
MCV RBC AUTO: 83 FL (ref 82–98)
MONOCYTES # BLD AUTO: 0.4 K/UL (ref 0.3–1)
MONOCYTES NFR BLD: 6.9 % (ref 4–15)
NEUTROPHILS # BLD AUTO: 2.2 K/UL (ref 1.8–7.7)
NEUTROPHILS NFR BLD: 41.8 % (ref 38–73)
NONHDLC SERPL-MCNC: 177 MG/DL
NRBC BLD-RTO: 0 /100 WBC
PLATELET # BLD AUTO: 435 K/UL (ref 150–350)
PMV BLD AUTO: 10.2 FL (ref 9.2–12.9)
POTASSIUM SERPL-SCNC: 4.2 MMOL/L (ref 3.5–5.1)
PROT SERPL-MCNC: 7.1 G/DL (ref 6–8.4)
RBC # BLD AUTO: 4.29 M/UL (ref 4–5.4)
SODIUM SERPL-SCNC: 138 MMOL/L (ref 136–145)
TRIGL SERPL-MCNC: 117 MG/DL (ref 30–150)
WBC # BLD AUTO: 5.18 K/UL (ref 3.9–12.7)

## 2020-02-13 PROCEDURE — 80053 COMPREHEN METABOLIC PANEL: CPT

## 2020-02-13 PROCEDURE — 36415 COLL VENOUS BLD VENIPUNCTURE: CPT | Mod: PO

## 2020-02-13 PROCEDURE — 83036 HEMOGLOBIN GLYCOSYLATED A1C: CPT

## 2020-02-13 PROCEDURE — 85025 COMPLETE CBC W/AUTO DIFF WBC: CPT

## 2020-02-13 PROCEDURE — 80061 LIPID PANEL: CPT

## 2020-02-14 LAB
ESTIMATED AVG GLUCOSE: 243 MG/DL (ref 68–131)
HBA1C MFR BLD HPLC: 10.1 % (ref 4–5.6)

## 2020-02-20 ENCOUNTER — OFFICE VISIT (OUTPATIENT)
Dept: INTERNAL MEDICINE | Facility: CLINIC | Age: 55
End: 2020-02-20
Payer: MEDICAID

## 2020-02-20 VITALS
DIASTOLIC BLOOD PRESSURE: 80 MMHG | BODY MASS INDEX: 34.48 KG/M2 | HEART RATE: 76 BPM | WEIGHT: 187.38 LBS | TEMPERATURE: 98 F | SYSTOLIC BLOOD PRESSURE: 122 MMHG | HEIGHT: 62 IN

## 2020-02-20 DIAGNOSIS — I10 ESSENTIAL HYPERTENSION: ICD-10-CM

## 2020-02-20 DIAGNOSIS — E11.9 TYPE 2 DIABETES MELLITUS WITHOUT COMPLICATION, WITHOUT LONG-TERM CURRENT USE OF INSULIN: Primary | ICD-10-CM

## 2020-02-20 DIAGNOSIS — M17.0 PRIMARY OSTEOARTHRITIS OF BOTH KNEES: ICD-10-CM

## 2020-02-20 DIAGNOSIS — Z98.84 STATUS POST GASTRIC BYPASS FOR OBESITY: ICD-10-CM

## 2020-02-20 DIAGNOSIS — E78.49 OTHER HYPERLIPIDEMIA: ICD-10-CM

## 2020-02-20 PROCEDURE — 99214 PR OFFICE/OUTPT VISIT, EST, LEVL IV, 30-39 MIN: ICD-10-PCS | Mod: S$PBB,,, | Performed by: INTERNAL MEDICINE

## 2020-02-20 PROCEDURE — 99999 PR PBB SHADOW E&M-EST. PATIENT-LVL III: CPT | Mod: PBBFAC,,, | Performed by: INTERNAL MEDICINE

## 2020-02-20 PROCEDURE — 99214 OFFICE O/P EST MOD 30 MIN: CPT | Mod: S$PBB,,, | Performed by: INTERNAL MEDICINE

## 2020-02-20 PROCEDURE — 99213 OFFICE O/P EST LOW 20 MIN: CPT | Mod: PBBFAC,PO | Performed by: INTERNAL MEDICINE

## 2020-02-20 PROCEDURE — 99999 PR PBB SHADOW E&M-EST. PATIENT-LVL III: ICD-10-PCS | Mod: PBBFAC,,, | Performed by: INTERNAL MEDICINE

## 2020-02-20 RX ORDER — ATORVASTATIN CALCIUM 40 MG/1
40 TABLET, FILM COATED ORAL DAILY
Qty: 30 TABLET | Refills: 10 | Status: SHIPPED | OUTPATIENT
Start: 2020-02-20 | End: 2020-07-10 | Stop reason: SDUPTHER

## 2020-02-20 RX ORDER — BLOOD-GLUCOSE METER
EACH MISCELLANEOUS
COMMUNITY
Start: 2019-11-22

## 2020-02-20 NOTE — PROGRESS NOTES
Subjective:       Patient ID: Sharron Shafer is a 54 y.o. female.    Chief Complaint: Follow-up    HPI   The patient presents for follow-up of diabetes, hypertension, and hyperlipidemia.  The patient reports she is doing well.  She uses the treadmill daily for exercise.  Blood sugar levels have been variable.  No hypoglycemia has been noted.    Review of Systems   Constitutional: Negative for activity change, appetite change and unexpected weight change.   Eyes: Negative for visual disturbance.   Respiratory: Negative for shortness of breath.    Cardiovascular: Negative for chest pain, palpitations and leg swelling.   Gastrointestinal: Negative for abdominal pain, blood in stool and diarrhea.   Endocrine: Negative for polyphagia and polyuria.   Genitourinary: Negative for dysuria, frequency, hematuria and urgency.   Skin: Negative for pallor.   Neurological: Negative for dizziness, seizures, speech difficulty, weakness, numbness and headaches.   Psychiatric/Behavioral: Negative for confusion and sleep disturbance.       Objective:      Physical Exam   Constitutional: She is oriented to person, place, and time. She appears well-developed and well-nourished. No distress.   HENT:   Head: Normocephalic and atraumatic.   Eyes: Pupils are equal, round, and reactive to light. Conjunctivae and EOM are normal. No scleral icterus.   Neck: Normal range of motion. Neck supple. No JVD present. No thyromegaly present.   Cardiovascular: Normal rate, regular rhythm, normal heart sounds and intact distal pulses. Exam reveals no gallop and no friction rub.   No murmur heard.  Pulmonary/Chest: Effort normal and breath sounds normal. No respiratory distress. She has no wheezes. She has no rales.   Abdominal: Soft. Bowel sounds are normal. She exhibits no mass. There is no tenderness.   Musculoskeletal: Normal range of motion. She exhibits no edema or tenderness.   Lymphadenopathy:     She has no cervical adenopathy.   Neurological:  She is alert and oriented to person, place, and time. No cranial nerve deficit.   Sensory exam is intact in both feet on monofilament testing.   Skin: Skin is warm and dry. No rash noted.   No foot lesions are present.   Psychiatric: She has a normal mood and affect. Her behavior is normal.   Nursing note and vitals reviewed.      Results for orders placed or performed in visit on 02/13/20   Comprehensive metabolic panel   Result Value Ref Range    Sodium 138 136 - 145 mmol/L    Potassium 4.2 3.5 - 5.1 mmol/L    Chloride 106 95 - 110 mmol/L    CO2 23 23 - 29 mmol/L    Glucose 207 (H) 70 - 110 mg/dL    BUN, Bld 12 6 - 20 mg/dL    Creatinine 0.9 0.5 - 1.4 mg/dL    Calcium 9.0 8.7 - 10.5 mg/dL    Total Protein 7.1 6.0 - 8.4 g/dL    Albumin 3.5 3.5 - 5.2 g/dL    Total Bilirubin 0.5 0.1 - 1.0 mg/dL    Alkaline Phosphatase 134 55 - 135 U/L    AST 17 10 - 40 U/L    ALT 14 10 - 44 U/L    Anion Gap 9 8 - 16 mmol/L    eGFR if African American >60.0 >60 mL/min/1.73 m^2    eGFR if non African American >60.0 >60 mL/min/1.73 m^2   CBC auto differential   Result Value Ref Range    WBC 5.18 3.90 - 12.70 K/uL    RBC 4.29 4.00 - 5.40 M/uL    Hemoglobin 10.6 (L) 12.0 - 16.0 g/dL    Hematocrit 35.8 (L) 37.0 - 48.5 %    Mean Corpuscular Volume 83 82 - 98 fL    Mean Corpuscular Hemoglobin 24.7 (L) 27.0 - 31.0 pg    Mean Corpuscular Hemoglobin Conc 29.6 (L) 32.0 - 36.0 g/dL    RDW 17.4 (H) 11.5 - 14.5 %    Platelets 435 (H) 150 - 350 K/uL    MPV 10.2 9.2 - 12.9 fL    Immature Granulocytes 0.0 0.0 - 0.5 %    Gran # (ANC) 2.2 1.8 - 7.7 K/uL    Immature Grans (Abs) 0.00 0.00 - 0.04 K/uL    Lymph # 2.4 1.0 - 4.8 K/uL    Mono # 0.4 0.3 - 1.0 K/uL    Eos # 0.2 0.0 - 0.5 K/uL    Baso # 0.02 0.00 - 0.20 K/uL    nRBC 0 0 /100 WBC    Gran% 41.8 38.0 - 73.0 %    Lymph% 46.3 18.0 - 48.0 %    Mono% 6.9 4.0 - 15.0 %    Eosinophil% 4.6 0.0 - 8.0 %    Basophil% 0.4 0.0 - 1.9 %    Differential Method Automated    Lipid panel   Result Value Ref Range     Cholesterol 234 (H) 120 - 199 mg/dL    Triglycerides 117 30 - 150 mg/dL    HDL 57 40 - 75 mg/dL    LDL Cholesterol 153.6 63.0 - 159.0 mg/dL    Hdl/Cholesterol Ratio 24.4 20.0 - 50.0 %    Total Cholesterol/HDL Ratio 4.1 2.0 - 5.0    Non-HDL Cholesterol 177 mg/dL   Hemoglobin A1c   Result Value Ref Range    Hemoglobin A1C 10.1 (H) 4.0 - 5.6 %    Estimated Avg Glucose 243 (H) 68 - 131 mg/dL       Assessment:       1. Type 2 diabetes mellitus without complication, without long-term current use of insulin    2. Essential hypertension    3. Other hyperlipidemia    4. Primary osteoarthritis of both knees    5. Status post gastric bypass for obesity        Plan:     Sharron was seen today for follow-up.  Pravastatin will be discontinued and atorvastatin will be ordered for better lipid control.  The dose of Januvia will be increased to 100 mg daily.  Blood tests and a follow-up visit in 4 months will be obtained.    Diagnoses and all orders for this visit:    Type 2 diabetes mellitus without complication, without long-term current use of insulin  -     SITagliptin (JANUVIA) 100 MG Tab; Take 1 tablet (100 mg total) by mouth once daily. For diabetes control  -     Comprehensive metabolic panel; Future  -     Lipid panel; Future  -     Hemoglobin A1c; Future    Essential hypertension    Other hyperlipidemia    Primary osteoarthritis of both knees    Status post gastric bypass for obesity    Other orders  -     atorvastatin (LIPITOR) 40 MG tablet; Take 1 tablet (40 mg total) by mouth once daily.

## 2020-03-11 ENCOUNTER — TELEPHONE (OUTPATIENT)
Dept: INTERNAL MEDICINE | Facility: CLINIC | Age: 55
End: 2020-03-11

## 2020-03-11 NOTE — TELEPHONE ENCOUNTER
----- Message from Eric Najera MD sent at 3/11/2020  2:27 PM CDT -----  Please link lab orders for follow-up in 06/2020.

## 2020-05-27 ENCOUNTER — PATIENT OUTREACH (OUTPATIENT)
Dept: ADMINISTRATIVE | Facility: OTHER | Age: 55
End: 2020-05-27

## 2020-05-29 ENCOUNTER — OFFICE VISIT (OUTPATIENT)
Dept: OPTOMETRY | Facility: CLINIC | Age: 55
End: 2020-05-29
Payer: MEDICAID

## 2020-05-29 DIAGNOSIS — H52.7 REFRACTIVE ERROR: ICD-10-CM

## 2020-05-29 DIAGNOSIS — E11.9 TYPE 2 DIABETES MELLITUS WITHOUT RETINOPATHY: Primary | ICD-10-CM

## 2020-05-29 PROCEDURE — 92014 COMPRE OPH EXAM EST PT 1/>: CPT | Mod: S$PBB,,, | Performed by: OPTOMETRIST

## 2020-05-29 PROCEDURE — 92015 DETERMINE REFRACTIVE STATE: CPT | Mod: ,,, | Performed by: OPTOMETRIST

## 2020-05-29 PROCEDURE — 92014 PR EYE EXAM, EST PATIENT,COMPREHESV: ICD-10-PCS | Mod: S$PBB,,, | Performed by: OPTOMETRIST

## 2020-05-29 PROCEDURE — 99211 OFF/OP EST MAY X REQ PHY/QHP: CPT | Mod: PBBFAC,PO | Performed by: OPTOMETRIST

## 2020-05-29 PROCEDURE — 92015 PR REFRACTION: ICD-10-PCS | Mod: ,,, | Performed by: OPTOMETRIST

## 2020-05-29 PROCEDURE — 99999 PR PBB SHADOW E&M-EST. PATIENT-LVL I: CPT | Mod: PBBFAC,,, | Performed by: OPTOMETRIST

## 2020-05-29 PROCEDURE — 99999 PR PBB SHADOW E&M-EST. PATIENT-LVL I: ICD-10-PCS | Mod: PBBFAC,,, | Performed by: OPTOMETRIST

## 2020-05-29 NOTE — PROGRESS NOTES
Subjective:       Patient ID: Sharron Shafer is a 54 y.o. female      Chief Complaint   Patient presents with    Concerns About Ocular Health    Diabetic Eye Exam     History of Present Illness  Dls: 1/10/19 Dr. Adams     53 y/o female presents today for diabetic eye exam.   Pt states no changes in vision. Pt wears single vision glasses for   reading.      x1 day     No tearing  No itching   No burning  No pain  No ha's  No floaters  No flashes    Eye meds  None    Hemoglobin A1C       Date                     Value               Ref Range           Status                02/13/2020               10.1 (H)            4.0 - 5.6 %         Final              11/15/2019               10.0 (H)            4.0 - 5.6 %         Final          08/13/2019               9.5 (H)             <5.7 % of tota*     Final              Assessment/Plan:     1. Type 2 diabetes mellitus without retinopathy  No diabetic retinopathy. Discussed with pt the effects of diabetes on vision, importance of good blood sugar control, compliance with meds, and follow up care with PCP. Return in 1 year for dilated eye exam, sooner PRN.    2. Refractive error  Educated patient on refractive error and discussed lens options. Dispensed updated spectacle Rx. Educated about adaptation period to new specs.    Eyeglass Final Rx     Eyeglass Final Rx       Sphere Cylinder    Right +2.25 Sphere    Left +2.00 Sphere    Type:  SVL near    Expiration Date:  5/30/2021                  Follow up in about 1 year (around 5/29/2021) for Diabetic Eye Exam.

## 2020-06-19 ENCOUNTER — LAB VISIT (OUTPATIENT)
Dept: LAB | Facility: HOSPITAL | Age: 55
End: 2020-06-19
Attending: INTERNAL MEDICINE
Payer: MEDICAID

## 2020-06-19 DIAGNOSIS — E11.9 TYPE 2 DIABETES MELLITUS WITHOUT COMPLICATION, WITHOUT LONG-TERM CURRENT USE OF INSULIN: ICD-10-CM

## 2020-06-19 DIAGNOSIS — I10 ESSENTIAL HYPERTENSION: ICD-10-CM

## 2020-06-19 LAB
ALBUMIN SERPL BCP-MCNC: 3.3 G/DL (ref 3.5–5.2)
ALP SERPL-CCNC: 127 U/L (ref 55–135)
ALT SERPL W/O P-5'-P-CCNC: 17 U/L (ref 10–44)
ANION GAP SERPL CALC-SCNC: 8 MMOL/L (ref 8–16)
AST SERPL-CCNC: 18 U/L (ref 10–40)
BILIRUB SERPL-MCNC: 0.5 MG/DL (ref 0.1–1)
BUN SERPL-MCNC: 8 MG/DL (ref 6–20)
CALCIUM SERPL-MCNC: 8.9 MG/DL (ref 8.7–10.5)
CHLORIDE SERPL-SCNC: 106 MMOL/L (ref 95–110)
CHOLEST SERPL-MCNC: 252 MG/DL (ref 120–199)
CHOLEST/HDLC SERPL: 5.1 {RATIO} (ref 2–5)
CO2 SERPL-SCNC: 26 MMOL/L (ref 23–29)
CREAT SERPL-MCNC: 1 MG/DL (ref 0.5–1.4)
EST. GFR  (AFRICAN AMERICAN): >60 ML/MIN/1.73 M^2
EST. GFR  (NON AFRICAN AMERICAN): >60 ML/MIN/1.73 M^2
ESTIMATED AVG GLUCOSE: 252 MG/DL (ref 68–131)
GLUCOSE SERPL-MCNC: 190 MG/DL (ref 70–110)
HBA1C MFR BLD HPLC: 10.4 % (ref 4–5.6)
HDLC SERPL-MCNC: 49 MG/DL (ref 40–75)
HDLC SERPL: 19.4 % (ref 20–50)
LDLC SERPL CALC-MCNC: 175.8 MG/DL (ref 63–159)
NONHDLC SERPL-MCNC: 203 MG/DL
POTASSIUM SERPL-SCNC: 3.7 MMOL/L (ref 3.5–5.1)
PROT SERPL-MCNC: 7 G/DL (ref 6–8.4)
SODIUM SERPL-SCNC: 140 MMOL/L (ref 136–145)
TRIGL SERPL-MCNC: 136 MG/DL (ref 30–150)

## 2020-06-19 PROCEDURE — 80053 COMPREHEN METABOLIC PANEL: CPT

## 2020-06-19 PROCEDURE — 80061 LIPID PANEL: CPT

## 2020-06-19 PROCEDURE — 36415 COLL VENOUS BLD VENIPUNCTURE: CPT | Mod: PO

## 2020-06-19 PROCEDURE — 83036 HEMOGLOBIN GLYCOSYLATED A1C: CPT

## 2020-06-30 ENCOUNTER — TELEPHONE (OUTPATIENT)
Dept: INTERNAL MEDICINE | Facility: CLINIC | Age: 55
End: 2020-06-30

## 2020-06-30 ENCOUNTER — OFFICE VISIT (OUTPATIENT)
Dept: INTERNAL MEDICINE | Facility: CLINIC | Age: 55
End: 2020-06-30
Payer: MEDICAID

## 2020-06-30 VITALS
HEIGHT: 62 IN | WEIGHT: 184.31 LBS | BODY MASS INDEX: 33.92 KG/M2 | TEMPERATURE: 98 F | SYSTOLIC BLOOD PRESSURE: 126 MMHG | DIASTOLIC BLOOD PRESSURE: 84 MMHG | HEART RATE: 66 BPM | RESPIRATION RATE: 18 BRPM | OXYGEN SATURATION: 99 %

## 2020-06-30 DIAGNOSIS — I10 ESSENTIAL HYPERTENSION: ICD-10-CM

## 2020-06-30 DIAGNOSIS — E11.9 TYPE 2 DIABETES MELLITUS WITHOUT COMPLICATION, WITHOUT LONG-TERM CURRENT USE OF INSULIN: Primary | ICD-10-CM

## 2020-06-30 DIAGNOSIS — E78.49 OTHER HYPERLIPIDEMIA: ICD-10-CM

## 2020-06-30 PROCEDURE — 99214 PR OFFICE/OUTPT VISIT, EST, LEVL IV, 30-39 MIN: ICD-10-PCS | Mod: S$PBB,,, | Performed by: INTERNAL MEDICINE

## 2020-06-30 PROCEDURE — 99214 OFFICE O/P EST MOD 30 MIN: CPT | Mod: PBBFAC,PO | Performed by: INTERNAL MEDICINE

## 2020-06-30 PROCEDURE — 99999 PR PBB SHADOW E&M-EST. PATIENT-LVL IV: ICD-10-PCS | Mod: PBBFAC,,, | Performed by: INTERNAL MEDICINE

## 2020-06-30 PROCEDURE — 99214 OFFICE O/P EST MOD 30 MIN: CPT | Mod: S$PBB,,, | Performed by: INTERNAL MEDICINE

## 2020-06-30 PROCEDURE — 99999 PR PBB SHADOW E&M-EST. PATIENT-LVL IV: CPT | Mod: PBBFAC,,, | Performed by: INTERNAL MEDICINE

## 2020-06-30 RX ORDER — DULAGLUTIDE 0.75 MG/.5ML
0.75 INJECTION, SOLUTION SUBCUTANEOUS
Qty: 4 SYRINGE | Refills: 4 | Status: SHIPPED | OUTPATIENT
Start: 2020-06-30 | End: 2020-07-10 | Stop reason: SDUPTHER

## 2020-06-30 RX ORDER — LANCETS 33 GAUGE
EACH MISCELLANEOUS
COMMUNITY
Start: 2020-06-05

## 2020-06-30 NOTE — TELEPHONE ENCOUNTER
----- Message from Delmar Pena sent at 6/30/2020 12:17 PM CDT -----  Doctor appointment and lab have been scheduled.  Please link lab orders to the lab appointment.  Date of doctor appointment:  10.26.20  Date of lab appointment:  10.19.20  Physical or EP: EP  Comments:

## 2020-06-30 NOTE — PROGRESS NOTES
Subjective:       Patient ID: Sharron Shafer is a 54 y.o. female.    Chief Complaint: Follow-up (4mo ) and Diabetes (Type 2 )    HPI   The patient presents for follow-up of medical conditions which include type 2 diabetes mellitus hypertension.  She does have joint pain knees.  Symptoms are.  Occasional lower back pain is.  Pain is nonradiating.  Vision is stable.  She reports blood sugar readings often greater than 200 fasting.  No hypoglycemia has been noted.  She does not monitor blood pressure readings.    Review of Systems   Constitutional: Negative for activity change, appetite change and unexpected weight change.   Eyes: Negative for visual disturbance.   Respiratory: Negative for shortness of breath.    Cardiovascular: Negative for chest pain, palpitations and leg swelling.   Gastrointestinal: Negative for abdominal pain, blood in stool and diarrhea.   Genitourinary: Negative for dysuria, frequency, hematuria and urgency.   Musculoskeletal: Positive for arthralgias.   Neurological: Negative for weakness, numbness and headaches.   Psychiatric/Behavioral: Negative for sleep disturbance.         Objective:      Physical Exam  Vitals signs and nursing note reviewed.   Constitutional:       General: She is not in acute distress.     Appearance: She is well-developed.   HENT:      Head: Normocephalic and atraumatic.   Eyes:      General: No scleral icterus.     Conjunctiva/sclera: Conjunctivae normal.      Pupils: Pupils are equal, round, and reactive to light.   Neck:      Musculoskeletal: Normal range of motion and neck supple.      Thyroid: No thyromegaly.      Vascular: No JVD.   Cardiovascular:      Rate and Rhythm: Normal rate and regular rhythm.      Heart sounds: Normal heart sounds. No murmur. No friction rub. No gallop.    Pulmonary:      Effort: Pulmonary effort is normal. No respiratory distress.      Breath sounds: Normal breath sounds. No wheezing or rales.   Abdominal:      General: Bowel sounds  are normal.      Palpations: Abdomen is soft. There is no mass.      Tenderness: There is no abdominal tenderness.   Musculoskeletal: Normal range of motion.         General: No tenderness.   Lymphadenopathy:      Cervical: No cervical adenopathy.   Skin:     General: Skin is warm and dry.      Findings: No rash.      Comments: No foot lesions are present.   Neurological:      Mental Status: She is alert and oriented to person, place, and time.      Cranial Nerves: No cranial nerve deficit.      Comments: Sensory exam is intact in both feet on monofilament and vibration testing.   Psychiatric:         Behavior: Behavior normal.         Results for orders placed or performed in visit on 06/19/20   Lipid panel   Result Value Ref Range    Cholesterol 252 (H) 120 - 199 mg/dL    Triglycerides 136 30 - 150 mg/dL    HDL 49 40 - 75 mg/dL    LDL Cholesterol 175.8 (H) 63.0 - 159.0 mg/dL    Hdl/Cholesterol Ratio 19.4 (L) 20.0 - 50.0 %    Total Cholesterol/HDL Ratio 5.1 (H) 2.0 - 5.0    Non-HDL Cholesterol 203 mg/dL   Comprehensive metabolic panel   Result Value Ref Range    Sodium 140 136 - 145 mmol/L    Potassium 3.7 3.5 - 5.1 mmol/L    Chloride 106 95 - 110 mmol/L    CO2 26 23 - 29 mmol/L    Glucose 190 (H) 70 - 110 mg/dL    BUN, Bld 8 6 - 20 mg/dL    Creatinine 1.0 0.5 - 1.4 mg/dL    Calcium 8.9 8.7 - 10.5 mg/dL    Total Protein 7.0 6.0 - 8.4 g/dL    Albumin 3.3 (L) 3.5 - 5.2 g/dL    Total Bilirubin 0.5 0.1 - 1.0 mg/dL    Alkaline Phosphatase 127 55 - 135 U/L    AST 18 10 - 40 U/L    ALT 17 10 - 44 U/L    Anion Gap 8 8 - 16 mmol/L    eGFR if African American >60.0 >60 mL/min/1.73 m^2    eGFR if non African American >60.0 >60 mL/min/1.73 m^2   Hemoglobin A1c   Result Value Ref Range    Hemoglobin A1C 10.4 (H) 4.0 - 5.6 %    Estimated Avg Glucose 252 (H) 68 - 131 mg/dL       Assessment:       1. Type 2 diabetes mellitus without complication, without long-term current use of insulin    2. Essential hypertension    3. Other  hyperlipidemia        Plan:     Sharron was seen today for follow-up and diabetes.  We discussed starting Trulicity injections once a week.  Januvia will be discontinued.  The patient will be referred to the diabetes nurse practitioner prior to starting Trulicity therapy and to review overall diabetes management.  The patient will check on her extended release metformin to see if it is involved in the recall.  A follow-up visit in 4 months is recommended.  Laboratory studies will be repeated in 4 months.    Diagnoses and all orders for this visit:    Type 2 diabetes mellitus without complication, without long-term current use of insulin    Essential hypertension    Other hyperlipidemia    Other orders  -     dulaglutide (TRULICITY) 0.75 mg/0.5 mL PnIj; Inject 0.5 mLs (0.75 mg total) into the skin every 7 days.

## 2020-07-10 ENCOUNTER — TELEPHONE (OUTPATIENT)
Dept: PHARMACY | Facility: CLINIC | Age: 55
End: 2020-07-10

## 2020-07-10 ENCOUNTER — OFFICE VISIT (OUTPATIENT)
Dept: INTERNAL MEDICINE | Facility: CLINIC | Age: 55
End: 2020-07-10
Payer: MEDICAID

## 2020-07-10 VITALS
HEART RATE: 84 BPM | BODY MASS INDEX: 34.12 KG/M2 | SYSTOLIC BLOOD PRESSURE: 138 MMHG | RESPIRATION RATE: 18 BRPM | WEIGHT: 185.44 LBS | DIASTOLIC BLOOD PRESSURE: 70 MMHG | TEMPERATURE: 97 F | HEIGHT: 62 IN

## 2020-07-10 DIAGNOSIS — I10 ESSENTIAL HYPERTENSION: Chronic | ICD-10-CM

## 2020-07-10 DIAGNOSIS — E11.9 TYPE 2 DIABETES MELLITUS WITHOUT COMPLICATION, WITHOUT LONG-TERM CURRENT USE OF INSULIN: Primary | Chronic | ICD-10-CM

## 2020-07-10 DIAGNOSIS — E78.49 OTHER HYPERLIPIDEMIA: ICD-10-CM

## 2020-07-10 DIAGNOSIS — E66.01 CLASS 2 SEVERE OBESITY WITH SERIOUS COMORBIDITY AND BODY MASS INDEX (BMI) OF 35.0 TO 35.9 IN ADULT, UNSPECIFIED OBESITY TYPE: ICD-10-CM

## 2020-07-10 PROCEDURE — 99214 OFFICE O/P EST MOD 30 MIN: CPT | Mod: S$PBB,,, | Performed by: NURSE PRACTITIONER

## 2020-07-10 PROCEDURE — 99215 OFFICE O/P EST HI 40 MIN: CPT | Mod: PBBFAC,PO | Performed by: NURSE PRACTITIONER

## 2020-07-10 PROCEDURE — 99999 PR PBB SHADOW E&M-EST. PATIENT-LVL V: ICD-10-PCS | Mod: PBBFAC,,, | Performed by: NURSE PRACTITIONER

## 2020-07-10 PROCEDURE — 99214 PR OFFICE/OUTPT VISIT, EST, LEVL IV, 30-39 MIN: ICD-10-PCS | Mod: S$PBB,,, | Performed by: NURSE PRACTITIONER

## 2020-07-10 PROCEDURE — 99999 PR PBB SHADOW E&M-EST. PATIENT-LVL V: CPT | Mod: PBBFAC,,, | Performed by: NURSE PRACTITIONER

## 2020-07-10 RX ORDER — ATORVASTATIN CALCIUM 40 MG/1
80 TABLET, FILM COATED ORAL NIGHTLY
Qty: 30 TABLET | Refills: 10 | Status: SHIPPED | OUTPATIENT
Start: 2020-07-10 | End: 2022-01-18

## 2020-07-10 RX ORDER — DULAGLUTIDE 0.75 MG/.5ML
0.75 INJECTION, SOLUTION SUBCUTANEOUS WEEKLY
Qty: 4 PEN | Refills: 6 | Status: SHIPPED | OUTPATIENT
Start: 2020-07-10 | End: 2020-07-10 | Stop reason: ALTCHOICE

## 2020-07-10 NOTE — PATIENT INSTRUCTIONS
"Stop januvia.   Continue metformin tablet 2x/daily.   Start Bydureon injection weekly - preferred on your plan over trulicity.  Increase cholesterol medication from 40 to 80 mg every night.     Follow ADA diet -   Will list tips below.   Follow up with dr. Najera and me with new labs in 3 months.   Call me with any issues or concerns.   Check fasting blood sugar in morning and normal for you should be 100 - 150.     Low Carb Snacks & Diabetes friendly foods   Snacks can be an important part of a balanced, healthy meal plan.   They allow you to eat more frequently, feeling full and satisfied throughout the day.   Also, they allow you to spread carbohydrates evenly, which may stabilize blood sugars.  Plus, snacks are enjoyable!     The amount of carbohydrate needed at snacks varies. Generally, about 15-30 grams of carbohydrate per snack is recommended.    Below you will find some tasty treats.       0-5 gm carb   Crystal Light flavoring (I like fruit punch flavor!)   Vitamin Water Zero   Anisa antioxidant drinks.    Herbal tea, unsweetened   2 tsp peanut butter on celery or carrots   1/2 cup sugar-free jell-o   1 sugar-free popsicle   ¼ cup blueberries or strawberries   8oz [place] unsweetened almond milk (or there is sugar free vanilla flavored as well).   5 baby carrots & celery sticks, cucumbers, bell peppers dipped in ¼ cup salsa, 2Tbsp light ranch dressing or 2Tbsp plain Greek yogurt   10 Goldfish crackers   ½ oz low-fat cheese or string cheese   1 closed handful of nuts, unsalted (example-->almonds, pistachios, cashews, peanuts, etc).   1 Tbsp of sunflower seeds, unsalted   1 cup Smart Pop popcorn   1 whole grain brown rice cake    "Think Thin" protein bars - my personal favorite is peanut butter, chocolate brownie, and oreo   Quest bars (my favorite is birthday cake, and also cinnamon roll)      Premier protein shakes - sold at [employer], or other brand alternatives - usually 1 - 2 grams of " "carbs (strawberry, vanilla, chocolate flavors)   Scrambled eggs! Or a fried egg or boiled eggs - add sliced tomatoes, cilantro or some chopped green onions.        15 gm carb   1 small piece of fruit or ½ banana or 1/2 cup lite canned fruit   3 lilibeth cracker squares   3 cups Smart Pop popcorn, top spray butter, Madrigal lite salt or cinnamon and Truvia   5 Vanilla Wafers   ½ cup low fat, no added sugar ice cream or frozen yogurt (Blue bell, Blue Bunny, Weight Watchers, Skinny Cow)   ½ turkey, ham, or chicken sandwich   ½ c fruit with ½ c Cottage cheese   4-6 unsalted wheat crackers with 1 oz low fat cheese or 1 tbsp peanut butter    30-45 goldfish crackers (depending on flavor)    7-8 Sabianist mini brown rice cakes (caramel, apple cinnamon, chocolate)    12 Sabianist mini brown rice cakes (cheddar, bbq, ranch)    1/3 cup hummus dip with raw veg   1/2 whole wheat dylan, 1Tbsp hummus   Mini Pizza (1/2 whole wheat English muffin, low-fat  cheese, tomato sauce)   100 calorie snack pack (Oreo, Chips Ahoy, Ritz Mix, Baked Cheetos)   4-6 oz. light or Greek Style yogurt (Chobani, Yoplait, Okios, Stoneyfield)   ½ cup sugar-free pudding     6 in. wheat tortilla or dylan oven toasted chips (topped with spray butter flavoring, cinnamon, Truvia OR spray butter, garlic powder, chili powder)    18 BBQ Popchips (available at Target, Whole Foods, Fresh Market)   Mini bagel (small size) toasted - add fat free cream cheese or avocado and sliced tomato on top - yum!    1/2 cup Halo top icecream - birthday cake is my go-to flavor.     Tips and Tricks:   My favorite "secret" seasoning to make things extra yummy is cinnamon for sweet taste or adding   "everything but the bagel" seasoning (you can get on amazon.com or at  joes. I have seen at local groceries such as [employer] too!).     Dark colored fruits are your friend -- blueberries, strawberries, raspberries, blackberries. They have a lower sugar content. So will be " "the best choice for you.   Light colored fruits are NOT your friend - they tend to be higher in sugar and are not the best options for an ADA diet - examples are oranges, bananas, peaches, watermelon, -- you can eat these, but carefully and in moderation.     Other snack choices    Celery with peanut butter   Celery with tuna salad   Dill pickles and cheddar cheese (no kidding, it's a great combo)   Nuts (keep raw ones in the freezer if you think you'll overeat them)   Sunflower seeds (get them in the shell so it will take longer to eat them)   Other seeds (How to Toast Pumpkin or Squash Seeds)    Pistachios or almonds - will fill you up and are tasty!    Low-Carb Trail Mix   Jerky (beef or turkey -- try to find low-sugar varieties)   Salami slices (you can find in the deli section)   Cheese sticks, such as string cheese   Sugar-free Jello, alone or with cottage cheese and a sprinkling of nuts. Make sugar-free lime Jello with part coconut milk -- For a large package, dissolve the powder in a cup of boiling water, add a can of coconut milk, and then add the rest of the water. Stir well.   Pepperoni "chips" -- Zap the slices in the microwave   Cheese with a few apple slices   4-ounce plain or sugar-free yogurt with berries and flax seed meal   Smoked salmon and cream cheese on cucumber slices   Lettuce Roll-ups -- Roll luncheon meat, egg salad, tuna or other filling and veggies in lettuce leaves   Lunch Meat Roll-ups -- Roll cheese or veggies in lunch meat (read the labels for carbs on the lunch meat)   Spread bean dip, spinach dip, or other low-carb dip or spread on the lunch meat or lettuce and then roll it up   Raw veggies and spinach dip, or other low-carb dip   Pork rinds (Chicharrón), with or without dip   Ricotta cheese with fruit and/or nuts and/or flax seed meal   Mushrooms with cheese spread inside (or other spreads or dips)   Low-carb snack bars (watch out for sugar alcohols, " especially maltitol)   Product Review: Atkins Advantage Bars   Pepperoni Chips -- Microwave pepperoni slices until crisp. Great with cheeses and dips   Garlic Parmesan Flax Seed Crackers   Parmesan Crisps -- Good when you want a crunchy snack.   Peanut Butter Protein Balls

## 2020-07-10 NOTE — TELEPHONE ENCOUNTER
Spoke to pt about med change. [facility] is sending the Rx of Bydureon which is a once weekly insulin. Pt verbalized understanding

## 2020-07-10 NOTE — PROGRESS NOTES
HPI: Sharron Shafer is a 55 y.o.  female c/I for visit to address Diabetes Type 2   This is the first time I am seeing this patient, she generally follows with Dr. Najera for primary care needs.     She was diagnosed with T2DM in 2007.   Her father also has T2DM.   Has never been hospitalized r/t DM.  a1c has trended up from 7.8% in 2017 to now 10.4% in 2020.   Reports her sugars were controlled more-so several years ago with a1c near 6% when she was on a plant based diet.   She is taking metformin 750XR 2 tablets daily and  januvia 100mg tablet daily  Dr. Najera recommended for her to add Trulicity 0.75mg sc weekly at last visit 2 weeks ago, but she has yet to receive it from the pharmacy.   She is willing to try, and she is also motivated to change her diet.   Denies missing doses of DM medication.       Past medical History:   Past Medical History:   Diagnosis Date    Diabetes mellitus, type 2     Hyperlipidemia     Hypertension     Obesity       Family hx:   Family History   Problem Relation Age of Onset    Hypertension Mother     Glaucoma Mother     Diabetes Father     Hypertension Father     Heart disease Father         CHF    Cataracts Father     Cancer Sister         kidney CA    Cancer Maternal Grandmother         breast and ovarian    Breast cancer Maternal Grandmother     Ovarian cancer Maternal Grandmother     No Known Problems Brother     No Known Problems Maternal Aunt     No Known Problems Maternal Uncle     No Known Problems Paternal Aunt     No Known Problems Paternal Uncle     No Known Problems Maternal Grandfather     No Known Problems Paternal Grandmother     No Known Problems Paternal Grandfather     Amblyopia Neg Hx     Blindness Neg Hx     Macular degeneration Neg Hx     Retinal detachment Neg Hx     Strabismus Neg Hx     Stroke Neg Hx     Thyroid disease Neg Hx       Current meds:   Current Outpatient Medications:     amLODIPine (NORVASC) 5 MG tablet, Take 1  tablet (5 mg total) by mouth once daily. For blood pressure, Disp: 30 tablet, Rfl: 11    atorvastatin (LIPITOR) 40 MG tablet, Take 1 tablet (40 mg total) by mouth once daily., Disp: 30 tablet, Rfl: 10    blood sugar diagnostic Strp, To check BG 2 times daily, to use with insurance preferred meter, Disp: 100 strip, Rfl: 3    blood sugar diagnostic Strp, To check BG 1 time daily, to use with insurance preferred meter, Disp: 100 strip, Rfl: 3    cyanocobalamin (VITAMIN B-12) 1000 MCG tablet, Take one capsule weekly, Disp: 30 tablet, Rfl: 11    estradiol (ESTRACE) 1 MG tablet, , Disp: , Rfl:     gabapentin (NEURONTIN) 300 MG capsule, Take one cap PO QHS for 7 days, then one cap PO BID for the next 7 days, and then take one cap PO TID and continue, Disp: 90 capsule, Rfl: 2    losartan-hydrochlorothiazide 100-12.5 mg (HYZAAR) 100-12.5 mg Tab, Take 1 tablet by mouth once daily., Disp: 30 tablet, Rfl: 11    magnesium oxide-Mg AA chelate (MG-PLUS-PROTEIN) 133 mg Tab, Take by mouth., Disp: , Rfl:     metFORMIN (GLUCOPHAGE-XR) 750 MG 24 hr tablet, Take 2 tablets (1,500 mg total) by mouth daily with breakfast., Disp: 60 tablet, Rfl: 11    multivitamin capsule, Take 1 capsule by mouth once daily., Disp: , Rfl:     TRUE METRIX GLUCOSE METER Misc, TEST BLOOD GLUCOSE ONCE D, Disp: , Rfl:     TRUEPLUS LANCETS 33 gauge Misc, , Disp: , Rfl:     dulaglutide (TRULICITY) 0.75 mg/0.5 mL pen injector, Inject 0.75 mg into the skin once a week., Disp: 4 pen, Rfl: 6     Social:   Lives at home with her daughter who is 20 years old.   Life changes/stressors currently:   Diet: not always following ADA diet - 3 meals per day. Small snack.   Breakfast - cereal, a vegetable salad, egg and toast.   Lunch - usually small snack if anything - smoothie, apple or piece of fruit.  Dinner - home cooked, red beans and rice, baked chicken, meat-sauce.   Snacks - small at lunch time if anything  Exercise: none.   Activities: she had been retired  "but just got a new job with [employer] doing [employer].     Current Diabetes medications:   Metformin XR  januvia     Medications Tried and Failed:   Metformin immediate release - GI side effects.   januvia 50    Glucose Monitoring:   Checks in a.m. daily and occasionally in evening before bed if she has time.   In am 180 - 200  In pm - 200 - 250's.   Glucometer : True metrix test strips and lancets.    Vital Signs  /70   Pulse 84   Temp 97.1 °F (36.2 °C) (Oral)   Resp 18   Ht 5' 2" (1.575 m)   Wt 84.1 kg (185 lb 6.5 oz)   BMI 33.91 kg/m²     Pertinent Labs:   Hgba1c   Lab Results   Component Value Date    LABA1C 7.8 (H) 10/18/2017    HGBA1C 10.4 (H) 06/19/2020     Lipid panel   Lab Results   Component Value Date    CHOL 252 (H) 06/19/2020    CHOL 234 (H) 02/13/2020    CHOL 239 (H) 11/15/2019     Lab Results   Component Value Date    HDL 49 06/19/2020    HDL 57 02/13/2020    HDL 61 11/15/2019     Lab Results   Component Value Date    LDLCALC 175.8 (H) 06/19/2020    LDLCALC 153.6 02/13/2020    LDLCALC 152.6 11/15/2019     Lab Results   Component Value Date    TRIG 136 06/19/2020    TRIG 117 02/13/2020    TRIG 127 11/15/2019     Lab Results   Component Value Date    CHOLHDL 19.4 (L) 06/19/2020    CHOLHDL 24.4 02/13/2020    CHOLHDL 25.5 11/15/2019      CMP  Glucose   Date Value Ref Range Status   06/19/2020 190 (H) 70 - 110 mg/dL Final     BUN, Bld   Date Value Ref Range Status   06/19/2020 8 6 - 20 mg/dL Final     Creatinine   Date Value Ref Range Status   06/19/2020 1.0 0.5 - 1.4 mg/dL Final     eGFR if    Date Value Ref Range Status   06/19/2020 >60.0 >60 mL/min/1.73 m^2 Final     eGFR if non    Date Value Ref Range Status   06/19/2020 >60.0 >60 mL/min/1.73 m^2 Final     Comment:     Calculation used to obtain the estimated glomerular filtration  rate (eGFR) is the CKD-EPI equation.         Microalbumin creatinine ratio:   Lab Results   Component Value Date "    MICALBCREAT 2 05/15/2019       Review of Pertinent co-morbidities/risk factors:   CV: Denies history of MI nor stroke.   CAD: denies  Does not take aspirin.   BP: has history of HTN  Statin: Taking  ACE/ARB: Taking    Social History     Tobacco Use   Smoking Status Never Smoker   Smokeless Tobacco Never Used        Standards of care:   Eyes: .: 05/29/2020  Foot exam: Most Recent Foot Exam Date: Not Found   Diabetes education: none     Review Of Systems:   Gen: Appetite good, no weight gain or loss, denies fatigue and weakness.  Skin: Skin is intact and heals well, no rashes, no hair changes  Eyes: Denies visual disturbances, no blurred vision.   Resp: no SOB or Dyspnea on exertion, denies cough.   Cardiac: Denies chest pain, palpitations, or swelling.   GI: Denies abdominal pain, nausea or vomiting, diarrhea, constipation.   /GYN: No nocturia, burning or pain.   MS/Neuro: Denies numbness/ tingling in BLE; Gait steady, speech clear  Psych: Denies drug/ETOH abuse, no hx of depression.  Other systems: negative.    Physical Exam:   GENERAL: Well developed, well nourished in appearance.   PSYCH: AAOx3, appropriate mood and affect, pleasant expression, conversant, appears relaxed, well groomed.   EYES: PERRL, Conjunctiva and corneas clear  NECK: Soft and Supple, trachea midline   CHEST: Even, regular, and unlabored respirations  ABDOMEN: Soft, non-tender, non-distended   VASCULAR: pedal pulses palpable bilaterally, no edema.  NEURO:  cranial nerves II - XII intact   MUSCULOSKELETAL: Good ROM, equal strength against resistance to bilateral lower extremities, steady gait.   SKIN: Skin warm, dry, and intact - no acanthosis nigracans.  FEET: Protective Sensation (w/ 10 gram monofilament):  +2 dorsalis pedis and posterior pulses noted.  No foot deformity, corns or callus formation,   No Onychomycosis, nails in good condition and well trimmed, no interspace maceration or ulceration noted.    Decreased hair growth present  over toes/feet.  Positive vibratory response to 128 Hz tuning fork bilaterally.      Assessment and Plan of Care:     Sharron was seen today for diabetes.    Diagnoses and all orders for this visit:    Type 2 diabetes mellitus without complication, without long-term current use of insulin  -     dulaglutide (TRULICITY) 0.75 mg/0.5 mL pen injector; Inject 0.75 mg into the skin once a week.    Class 2 severe obesity with serious comorbidity and body mass index (BMI) of 35.0 to 35.9 in adult, unspecified obesity type    Essential hypertension    Other hyperlipidemia      1. T2DM with hyperglycemia- Hgba1c goal is 7.5% or less - 7.8% --> 10.4%.   Continue metformin 750XR 2 tablets daily.   Discontinue januvia - interferes with GLP1 pathway.   Start GLP1 - trulicity not preferred on her plan. States she must try bydureon, byetta or victoza. Will send in rx for bydureon 2mg sc weekly to try first.   discussed DM, progression of disease, long term complications, CV risk factors and tx options.   Advise compliance with ADA diet and encourage exercise   Instructed to monitor Blood glucose 1x daily in a.m. - fasting glucose goal is 100 - 150.   Write down in log to bring to clinic to review for next visit.   Eyes - saw optometry, dr. gordon 5/29/2020 - no diabetic retinopathy. Will advise annual follow-ups at this point.   Reviewed foot care.     2. HTN- controlled for most part - on arb/hctz - continue meds as previously prescribed and monitor.   Recheck cmp and microalbumin creat ratio in 3 months.     3. HLP - LDL goal < 100.   Currently on statin therapy- not at goal. Increase from 40 to 80mg every Hs.   LFTs WNL    4. Weight - BMI Body mass index is 33.91 kg/m².   Encourage Ada diet and exercise.      Follow up in 3 - 4 months with OV and labs prior        7/10/2020 - addendum - sent in rx for trulicity but preferred on plan is bydureon, byetta or victoza. Will send in rx for bydureon instead. - RS

## 2020-07-16 ENCOUNTER — TELEPHONE (OUTPATIENT)
Dept: INTERNAL MEDICINE | Facility: CLINIC | Age: 55
End: 2020-07-16

## 2020-07-16 NOTE — TELEPHONE ENCOUNTER
----- Message from Betsy Pineda sent at 7/16/2020  1:58 PM CDT -----  Contact: Hannah nguyễn/ [email or web address]  or   Hannah was calling to confirm fax sent. Ref # [id number]. Please advise

## 2020-08-05 NOTE — TELEPHONE ENCOUNTER
""The medication, Trulicity, requires a prior authorization. The rejection states patient MUST try a preferred GLP-1 medication prior to treatment.   These medications include Victoza, Bydureon and Byetta. I do not see where patient has tried these medications.   Do you want to change the medication or   provide documentation that I can send to the insurance company on why she cannot take them?   "    I left [facility] at [facility] [phone]- patient is trying bydureon first because pa will not be approved unless tries and fails use of this first.          "

## 2020-08-12 ENCOUNTER — TELEPHONE (OUTPATIENT)
Dept: INTERNAL MEDICINE | Facility: CLINIC | Age: 55
End: 2020-08-12

## 2020-08-12 NOTE — TELEPHONE ENCOUNTER
Fax with cover my meds key for trulicity.  Key [id number]    She has not started trulicity yet.   Insurance prefers bydureon and she is on that and metformin at this time.    I left msg on 7/16 with same info at [facility] [phone].  I called again and said cancel trulicity rx.  Patient cannot use this yet.  Has to fail bydureon rx.

## 2020-10-05 ENCOUNTER — PATIENT MESSAGE (OUTPATIENT)
Dept: ADMINISTRATIVE | Facility: HOSPITAL | Age: 55
End: 2020-10-05

## 2020-10-12 ENCOUNTER — TELEPHONE (OUTPATIENT)
Dept: INTERNAL MEDICINE | Facility: CLINIC | Age: 55
End: 2020-10-12

## 2020-10-12 DIAGNOSIS — I10 ESSENTIAL HYPERTENSION: ICD-10-CM

## 2020-10-12 DIAGNOSIS — E11.9 TYPE 2 DIABETES MELLITUS WITHOUT COMPLICATION, WITHOUT LONG-TERM CURRENT USE OF INSULIN: Primary | ICD-10-CM

## 2020-10-12 DIAGNOSIS — E78.49 OTHER HYPERLIPIDEMIA: ICD-10-CM

## 2020-10-12 NOTE — TELEPHONE ENCOUNTER
----- Message from Betsy Pineda sent at 10/12/2020  3:43 PM CDT -----  Contact: Self   Pt was calling to reschedule appt. No dates available during scheduling. Please advise

## 2020-11-24 ENCOUNTER — TELEPHONE (OUTPATIENT)
Dept: INTERNAL MEDICINE | Facility: CLINIC | Age: 55
End: 2020-11-24

## 2020-11-24 NOTE — TELEPHONE ENCOUNTER
----- Message from Kaylyn Shafer MA sent at 11/24/2020  2:08 PM CST -----  Contact: [phone]  pt  Patient is returning a phone call.  Who left a message for the patient: Marylou Aaron MA   Does patient know what this is regarding:    Comments: appt
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
## 2020-11-25 ENCOUNTER — OFFICE VISIT (OUTPATIENT)
Dept: INTERNAL MEDICINE | Facility: CLINIC | Age: 55
End: 2020-11-25
Payer: COMMERCIAL

## 2020-11-25 VITALS
BODY MASS INDEX: 34.32 KG/M2 | RESPIRATION RATE: 15 BRPM | TEMPERATURE: 98 F | HEART RATE: 87 BPM | WEIGHT: 186.5 LBS | HEIGHT: 62 IN | OXYGEN SATURATION: 98 % | DIASTOLIC BLOOD PRESSURE: 62 MMHG | SYSTOLIC BLOOD PRESSURE: 136 MMHG

## 2020-11-25 DIAGNOSIS — S83.91XA SPRAIN OF RIGHT KNEE, UNSPECIFIED LIGAMENT, INITIAL ENCOUNTER: ICD-10-CM
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
## 2020-11-25 DIAGNOSIS — S13.4XXA WHIPLASH INJURY SYNDROME, INITIAL ENCOUNTER: ICD-10-CM

## 2020-11-25 DIAGNOSIS — S43.401A SPRAIN OF RIGHT SHOULDER, UNSPECIFIED SHOULDER SPRAIN TYPE, INITIAL ENCOUNTER: ICD-10-CM

## 2020-11-25 PROCEDURE — 99214 OFFICE O/P EST MOD 30 MIN: CPT | Mod: S$GLB,,, | Performed by: INTERNAL MEDICINE

## 2020-11-25 PROCEDURE — 99999 PR PBB SHADOW E&M-EST. PATIENT-LVL IV: ICD-10-PCS | Mod: PBBFAC,,, | Performed by: INTERNAL MEDICINE

## 2020-11-25 PROCEDURE — 99214 OFFICE O/P EST MOD 30 MIN: CPT | Mod: PBBFAC,PO | Performed by: INTERNAL MEDICINE

## 2020-11-25 PROCEDURE — 99999 PR PBB SHADOW E&M-EST. PATIENT-LVL IV: CPT | Mod: PBBFAC,,, | Performed by: INTERNAL MEDICINE

## 2020-11-25 PROCEDURE — 99214 PR OFFICE/OUTPT VISIT, EST, LEVL IV, 30-39 MIN: ICD-10-PCS | Mod: S$GLB,,, | Performed by: INTERNAL MEDICINE

## 2020-11-25 RX ORDER — CYCLOBENZAPRINE HCL 10 MG
10 TABLET ORAL 3 TIMES DAILY PRN
Qty: 60 TABLET | Refills: 1 | Status: SHIPPED | OUTPATIENT
Start: 2020-11-25 | End: 2020-12-25

## 2020-11-25 RX ORDER — NABUMETONE 750 MG/1
750 TABLET, FILM COATED ORAL 2 TIMES DAILY
Qty: 60 TABLET | Refills: 1 | Status: SHIPPED | OUTPATIENT
Start: 2020-11-25 | End: 2021-10-19

## 2020-11-25 RX ORDER — NAPROXEN 375 MG/1
TABLET ORAL
COMMUNITY
Start: 2020-11-21 | End: 2020-11-25
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
## 2020-12-09 ENCOUNTER — TELEPHONE (OUTPATIENT)
Dept: INTERNAL MEDICINE | Facility: CLINIC | Age: 55
End: 2020-12-09

## 2020-12-09 DIAGNOSIS — M25.569 KNEE PAIN, UNSPECIFIED CHRONICITY, UNSPECIFIED LATERALITY: ICD-10-CM

## 2020-12-09 DIAGNOSIS — M25.511 ACUTE PAIN OF RIGHT SHOULDER: Primary | ICD-10-CM
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
## 2020-12-10 ENCOUNTER — TELEPHONE (OUTPATIENT)
Dept: INTERNAL MEDICINE | Facility: CLINIC | Age: 55
End: 2020-12-10

## 2020-12-10 NOTE — TELEPHONE ENCOUNTER
----- Message from Germaine Cuenca sent at 12/10/2020  9:37 AM CST -----  Contact: Patient [phone]  Stated that the referral should be for Ortho not Ortho Surgery. Request that the [place] be [place] [facility] [place]    Please fix    Thank you

## 2020-12-10 NOTE — TELEPHONE ENCOUNTER
Spoke to patient, she was confused between Orthopedic surgery and Orthopedic. Instructed patient they are the same except if you need surgery he can do that too.  Stated understanding

## 2020-12-11 ENCOUNTER — PATIENT MESSAGE (OUTPATIENT)
Dept: OTHER | Facility: OTHER | Age: 55
End: 2020-12-11

## 2020-12-15 ENCOUNTER — TELEPHONE (OUTPATIENT)
Dept: ORTHOPEDICS | Facility: CLINIC | Age: 55
End: 2020-12-15

## 2020-12-15 NOTE — TELEPHONE ENCOUNTER
----- Message from Zackery Bernard sent at 12/15/2020 10:24 AM CST -----   Name of Who is Calling:     What is the request in detail:  patient request call back in reference to appointment Please contact to further discuss and advise      Can the clinic reply by [facility]: no    What Number to Call Back if not in [facility]:  [phone]

## 2021-01-04 ENCOUNTER — PATIENT MESSAGE (OUTPATIENT)
Dept: ADMINISTRATIVE | Facility: HOSPITAL | Age: 56
End: 2021-01-04

## 2021-02-17 DIAGNOSIS — Z12.31 OTHER SCREENING MAMMOGRAM: ICD-10-CM

## 2021-04-05 ENCOUNTER — PATIENT MESSAGE (OUTPATIENT)
Dept: ADMINISTRATIVE | Facility: HOSPITAL | Age: 56
End: 2021-04-05

## 2021-05-25 ENCOUNTER — TELEPHONE (OUTPATIENT)
Dept: INTERNAL MEDICINE | Facility: CLINIC | Age: 56
End: 2021-05-25

## 2021-05-25 DIAGNOSIS — E11.9 TYPE 2 DIABETES MELLITUS WITHOUT COMPLICATION, WITHOUT LONG-TERM CURRENT USE OF INSULIN: ICD-10-CM

## 2021-05-25 DIAGNOSIS — I10 ESSENTIAL HYPERTENSION: Primary | ICD-10-CM

## 2021-05-25 DIAGNOSIS — E66.01 CLASS 2 SEVERE OBESITY WITH SERIOUS COMORBIDITY AND BODY MASS INDEX (BMI) OF 35.0 TO 35.9 IN ADULT, UNSPECIFIED OBESITY TYPE: ICD-10-CM

## 2021-05-25 DIAGNOSIS — E78.5 HYPERLIPIDEMIA, UNSPECIFIED HYPERLIPIDEMIA TYPE: ICD-10-CM

## 2021-07-06 ENCOUNTER — PATIENT MESSAGE (OUTPATIENT)
Dept: ADMINISTRATIVE | Facility: HOSPITAL | Age: 56
End: 2021-07-06

## 2021-08-03 ENCOUNTER — PATIENT MESSAGE (OUTPATIENT)
Dept: ADMINISTRATIVE | Facility: HOSPITAL | Age: 56
End: 2021-08-03

## 2021-08-04 ENCOUNTER — HOSPITAL ENCOUNTER (OUTPATIENT)
Dept: RADIOLOGY | Facility: HOSPITAL | Age: 56
Discharge: HOME | End: 2021-08-04
Attending: INTERNAL MEDICINE
Payer: COMMERCIAL

## 2021-08-04 DIAGNOSIS — Z12.31 OTHER SCREENING MAMMOGRAM: ICD-10-CM

## 2021-08-04 PROCEDURE — 77063 MAMMO DIGITAL SCREENING BILAT WITH TOMO: ICD-10-PCS | Mod: 26,,, | Performed by: RADIOLOGY

## 2021-08-04 PROCEDURE — 77067 MAMMO DIGITAL SCREENING BILAT WITH TOMO: ICD-10-PCS | Mod: 26,,, | Performed by: RADIOLOGY

## 2021-08-04 PROCEDURE — 77067 SCR MAMMO BI INCL CAD: CPT | Mod: 26,,, | Performed by: RADIOLOGY

## 2021-08-04 PROCEDURE — 77063 BREAST TOMOSYNTHESIS BI: CPT | Mod: 26,,, | Performed by: RADIOLOGY

## 2021-08-04 PROCEDURE — 77067 SCR MAMMO BI INCL CAD: CPT | Mod: TC,PO

## 2021-09-16 NOTE — PROGRESS NOTES
"                                                    Physical Therapy Daily Note     Name: Sharron Shafer  Clinic Number: [id number]  Diagnosis:   Encounter Diagnoses   Name Primary?    Lumbar back pain with radiculopathy affecting right lower extremity     Decreased strength     Muscle tightness      Physician: Najma May, *  Precautions: acquired spondylolisthesis. DM Type II, Obesity (BMI 34.75 kg/m2). Fall risk. significant knee OA  Visit #: 6 of 20  PTA Visit #: 2  Time In: 2:30   Time Out: 3:30  Total treatment time: 60 min (1:1 with PTA 40 mins of treatment session)    Subjective     Pt reports: increased pain with prolonged sitting at work. Patient reports feeling like her Right knee is swollen.  Pain Scale: Sharron rates pain on a scale of 0-10 to be 4 currently.    Objective     Sharron received individual therapeutic exercises to develop strength, endurance, ROM, flexibility, posture and core stabilization for 60 minutes including:     Exercises  12/18/17 12/13/17 12/11/17 12/6/17 12/4/17    Visit  6  5 - FOTO done  4  3  2            Warm-up: Nustep  6 mins  ---  ---  ---  ---    LTR  2 min  2 min  2 min  2 min  2 min    DKTC on SB  2 min  2 min  2 min  2 min  2 min    HS stretch c strap  4 x 20" ea  5 x 10"  5 x 10" ea  5 x 10" ea  5 x 10" ea    ITB stretch c strap  4 x 20" ea  5 x 10"  5 x 10" ea  5 x 10" ea  5 x 10" ea    Supine hip flexor stretch  4 x 20" ea  4 x 20"  4 x 20"  4 x 20" ea  ---    SLR  2 x 10 ea  2 x 10  2 x 10  2 x 10  1 x 15 ea    HL Hip Add ball squeeze  2 min  2 min  2 min  2 mins  2 minutes    HL Hip Abduction  2 min  2 min  GTB x 2 min  RTB x 2 min  RTB x 2 min    Glute squeezes  20 x 5"   20 x 5"  ---  NP 2* to pain  ---    Pelvic tilts  20 x 3"  20 x 3"  ---  ---  ---    Open book  10 x ea  10 x ea  ---  ---  ---    Narrow rows  RTB x 20  RTB x 20  ---  ---  ---    SALPD  RTB x 20  RTB x 20  ---  ---  ---     Sharron received the following manual therapy " techniques to the lumbar spine: 00 minutes  Manual lumbar traction - not today  Right piriformis release, STM to Right piriformis and lumbar spine  Kinesio tape applied to Right QL/paraspinals for muscle inhibition. Patient received education regarding appropriate care and removal of Kinesiotape. Patient instructed in proper removal techniques if skin irritation occurs.     The patient received the following unsupervised modalities after being cleared for contradictions: 00 min x MHP to lumbar spine post treatment.    Written Home Exercises Provided: reviewed from IE  Pt demo good understanding of the education provided. Sharron demonstrated good return demonstration of activities.     Education provided re:  Sharron verbalized good understanding of education provided.   No spiritual or educational barriers to learning provided    Assessment     Sharron tolerated treatment session well today. Good tolerance to progression of therapeutic exercises with no exacerbation of symptoms. Patient with good response to increase in duration of stretches with visible decreased tissue flexibility in hamstrings and ITBand.  This is a 52 y.o. female referred to outpatient physical therapy and presents with a medical diagnosis of back, hip, and knee pain and demonstrates limitations as described in the problem list. Pt prognosis is Good. Pt will continue to benefit from skilled outpatient physical therapy to address the deficits listed in the problem list, provide pt/family education and to maximize pt's level of independence in the home and community environment.     Goals as follows: See IE on 11/17/17 (PN due by 12/17/17)     Plan     Continue with established Plan of Care towards PT goals.    Therapist: Tootie Becerra, PTA  12/18/2017   MFM - IUP at 28+ weeks admitted with arrested  labor in pregnancy complicated by A2GDM, short cervix on vaginal progesterone, uterine fibroid and prior  delivery.  Patient feeling well this morning.  Report no abdominal pain and irregular pelvic pressure.  Feeling tired from MGSO4 therapy.   Confirms fetal movement.  FHRT reactive with irregular contractions.  Bedside ultrasound performed and funic presentation appreciated.  Cervix appears closed on transabdominal imaging.  BPP 8/8 with normal UA Doppler studies.  Uterine fibroid appears unchanged.  Will complete betamethasone this evening.  Discontinue MGSO4 and ampicillin upon completion of betamethasone therapy.  Continue indomethacin for tocolysis x 48 hours.  Patient did not receive evening NPH and fasting BG this am 117.  Will start fasting, preprandial and 1 hour postprandial BG assessment while hospitalized secondary to expected hyperglycemia from betamethasone administration.  ISS coverage for premeal values >100.  Start NPH 12 units QHS as previously recommended.  Follow up culture results.  Patient voiced understanding of care plan and management and all questions answered.   Dora Case MD  Maternal Fetal Medicine MFM - IUP at 28+ weeks admitted with arrested  labor.  Patient feels well and reports no new contractions or abdominal pain.  Confirms fetal movement.  FHRT reassuring for gestational age without contractions.  Bedside ultrasound with BPP 8/8 and breech presentation.  Transabdominal evaluation of CL is stable.  Cervix closed on sterile vaginal exam.   Continue indocin for additional 24 hours.  Will increase evening NPH to 16 units given fasting hyperglycemia.  Continue pre and postprandial BG assessment with ISS coverage for premeal values >100.  Patient to ambulate and reassess for abdominal pain and contractions. Candidate for outpatient management once >24 hour from completion of betamethasone course, stable cervical assessment and no concerning symptoms.   Dora Case MD  Maternal Fetal Medicine  labor suppressed. Patient in good condition. May discharge home today with outpatient follow up by M in approximately 1 week.

## 2021-09-20 ENCOUNTER — CLINICAL SUPPORT (OUTPATIENT)
Dept: URGENT CARE | Facility: CLINIC | Age: 56
End: 2021-09-20
Payer: COMMERCIAL

## 2021-09-20 ENCOUNTER — TELEPHONE (OUTPATIENT)
Dept: URGENT CARE | Facility: CLINIC | Age: 56
End: 2021-09-20

## 2021-09-20 DIAGNOSIS — Z20.822 ENCOUNTER FOR LABORATORY TESTING FOR COVID-19 VIRUS: Primary | ICD-10-CM

## 2021-09-20 DIAGNOSIS — U07.1 COVID-19: Primary | ICD-10-CM

## 2021-09-20 DIAGNOSIS — U07.1 COVID-19 VIRUS DETECTED: ICD-10-CM

## 2021-09-20 LAB
CTP QC/QA: YES
SARS-COV-2 RDRP RESP QL NAA+PROBE: POSITIVE

## 2021-09-20 PROCEDURE — U0002 COVID-19 LAB TEST NON-CDC: HCPCS | Mod: QW,S$GLB,, | Performed by: INTERNAL MEDICINE

## 2021-09-20 PROCEDURE — U0002: ICD-10-PCS | Mod: QW,S$GLB,, | Performed by: INTERNAL MEDICINE

## 2021-09-20 RX ORDER — BENZONATATE 100 MG/1
100 CAPSULE ORAL 3 TIMES DAILY PRN
Qty: 15 CAPSULE | Refills: 0 | Status: SHIPPED | OUTPATIENT
Start: 2021-09-20 | End: 2021-09-25

## 2021-09-20 NOTE — PATIENT INSTRUCTIONS
Your test was POSITIVE for COVID-19 (coronavirus).       Please isolate yourself at home.  You may leave home and/or return to work once the following conditions are met:    If you were not hospitalized and are not severely immunocompromised*:   More than 10 days since symptoms first appeared AND   More than 24 hours fever free without medications AND   Symptoms have improved     If you were hospitalized OR are severely immunocompromised*:   More than 20 days since symptoms first appeared   More than 24 hours fever free without medications   Symptoms have improved    If you had no symptoms but tested positive:   More than 10 days since the date of the first positive test (20 days if severely immunocompromised).   If you develop symptoms, then use the guidelines above.     *Definition of severely immunocompromised:  - Current chemotherapy for cancer  - Untreated HIV with CD4 count less than 200  - Combined primary immunodeficiency disorder  - Prednisone more than 20 mg per day for more than 14 days  - Post-transplant patients      Contact Tracing    As one of the next steps, you will receive a call or text from the [place] Department of Health ([facility]) COVID-19 Tracing Team. See the contact information below so you know not to ignore the health departments call. It is important that you contact them back immediately so they can help.      Contact Tracer Number:  [phone]  Caller ID for most carriers: [facility]     What is contact tracing?  · Contact tracing is a process that helps identify everyone who has been in close contact with an infected person. Contact tracers let those people know they may have been exposed and guide them on next steps. Confidentiality is important for everyone; no one will be told who may have exposed them to the virus.  · Your involvement is important. The more we know about where and how this virus is spreading, the better chance we have at stopping it from spreading  further.  What does exposure mean?  · Exposure means you have been within 6 feet for more than 15 minutes with a person who has or had COVID-19.  What kind of questions do the contact tracers ask?  · A contact tracer will confirm your basic contact information including name, address, phone number, and next of kin, as well as asking about any symptoms you may have had. Theyll also ask you how you think you may have gotten sick, such as places where you may have been exposed to the virus, and people you were with. Those names will never be shared with anyone outside of that call, and will only be used to help trace and stop the spread of the virus.   I have privacy concerns. How will the state use my information?  · Your privacy about your health is important. All calls are completed using call centers that use the appropriate health privacy protection measures (HIPAA compliance), meaning that your patient information is safe. No one will ever ask you any questions related to immigration status. Your health comes first.   Do I have to participate?  · You do not have to participate, but we strongly encourage you to. Contact tracing can help us catch and control new outbreaks as theyre developing to keep your friends and family safe.   What if I dont hear from anyone?  · If you dont receive a call within 24 hours, you can call the number above right away to inquire about your status. That line is open from 8:00 am - 8:00 p.m., 7 days a week.  Contact tracing saves lives! Together, we have the power to beat this virus and keep our loved ones and neighbors safe.    For more information see CDC link below.      https://www.cdc.gov/coronavirus/2019-ncov/hcp/guidance-prevent-spread.html#precautions        Sources:  [facility], [place] Department [facility] and hospitals           Sincerely,     Leonarda Reilly MA

## 2021-09-22 ENCOUNTER — INFUSION (OUTPATIENT)
Dept: INFECTIOUS DISEASES | Facility: HOSPITAL | Age: 56
End: 2021-09-22
Attending: INTERNAL MEDICINE
Payer: COMMERCIAL

## 2021-09-22 VITALS
SYSTOLIC BLOOD PRESSURE: 149 MMHG | BODY MASS INDEX: 30 KG/M2 | WEIGHT: 163 LBS | DIASTOLIC BLOOD PRESSURE: 63 MMHG | TEMPERATURE: 98 F | HEIGHT: 62 IN | RESPIRATION RATE: 18 BRPM | HEART RATE: 89 BPM | OXYGEN SATURATION: 97 %

## 2021-09-22 DIAGNOSIS — U07.1 COVID-19: Primary | ICD-10-CM

## 2021-09-22 PROCEDURE — M0243 CASIRIVI AND IMDEVI INFUSION: HCPCS | Performed by: INTERNAL MEDICINE

## 2021-09-22 PROCEDURE — 63600175 PHARM REV CODE 636 W HCPCS: Performed by: INTERNAL MEDICINE

## 2021-09-22 PROCEDURE — 25000003 PHARM REV CODE 250: Performed by: INTERNAL MEDICINE

## 2021-09-22 RX ORDER — EPINEPHRINE 0.3 MG/.3ML
0.3 INJECTION SUBCUTANEOUS
Status: ACTIVE | OUTPATIENT
Start: 2021-09-22

## 2021-09-22 RX ORDER — SODIUM CHLORIDE 0.9 % (FLUSH) 0.9 %
10 SYRINGE (ML) INJECTION
Status: ACTIVE | OUTPATIENT
Start: 2021-09-22

## 2021-09-22 RX ORDER — ACETAMINOPHEN 325 MG/1
650 TABLET ORAL ONCE AS NEEDED
Status: ACTIVE | OUTPATIENT
Start: 2021-09-22 | End: 2033-02-18

## 2021-09-22 RX ORDER — ONDANSETRON 4 MG/1
4 TABLET, ORALLY DISINTEGRATING ORAL ONCE AS NEEDED
Status: ACTIVE | OUTPATIENT
Start: 2021-09-22 | End: 2033-02-18

## 2021-09-22 RX ORDER — ALBUTEROL SULFATE 90 UG/1
2 AEROSOL, METERED RESPIRATORY (INHALATION)
Status: ACTIVE | OUTPATIENT
Start: 2021-09-22

## 2021-09-22 RX ORDER — DIPHENHYDRAMINE HYDROCHLORIDE 50 MG/ML
25 INJECTION INTRAMUSCULAR; INTRAVENOUS ONCE AS NEEDED
Status: ACTIVE | OUTPATIENT
Start: 2021-09-22 | End: 2033-02-18

## 2021-09-22 RX ADMIN — CASIRIVIMAB AND IMDEVIMAB 600 MG: 600; 600 INJECTION, SOLUTION, CONCENTRATE INTRAVENOUS at 12:09

## 2021-10-06 ENCOUNTER — TELEPHONE (OUTPATIENT)
Dept: INTERNAL MEDICINE | Facility: CLINIC | Age: 56
End: 2021-10-06

## 2021-10-14 LAB
ALBUMIN SERPL-MCNC: 3.9 G/DL (ref 3.6–5.1)
ALBUMIN/GLOB SERPL: 1.3 (CALC) (ref 1–2.5)
ALP SERPL-CCNC: 93 U/L (ref 37–153)
ALT SERPL-CCNC: 15 U/L (ref 6–29)
AST SERPL-CCNC: 15 U/L (ref 10–35)
BASOPHILS # BLD AUTO: 31 CELLS/UL (ref 0–200)
BASOPHILS NFR BLD AUTO: 0.6 %
BILIRUB SERPL-MCNC: 0.5 MG/DL (ref 0.2–1.2)
BUN SERPL-MCNC: 8 MG/DL (ref 7–25)
BUN/CREAT SERPL: ABNORMAL (CALC) (ref 6–22)
CALCIUM SERPL-MCNC: 9.4 MG/DL (ref 8.6–10.4)
CHLORIDE SERPL-SCNC: 98 MMOL/L (ref 98–110)
CHOLEST SERPL-MCNC: 267 MG/DL
CHOLEST/HDLC SERPL: 3.7 (CALC)
CO2 SERPL-SCNC: 30 MMOL/L (ref 20–32)
CREAT SERPL-MCNC: 0.79 MG/DL (ref 0.5–1.05)
EOSINOPHIL # BLD AUTO: 362 CELLS/UL (ref 15–500)
EOSINOPHIL NFR BLD AUTO: 7.1 %
ERYTHROCYTE [DISTWIDTH] IN BLOOD BY AUTOMATED COUNT: 18.2 % (ref 11–15)
GLOBULIN SER CALC-MCNC: 3.1 G/DL (CALC) (ref 1.9–3.7)
GLUCOSE SERPL-MCNC: 179 MG/DL (ref 65–99)
HBA1C MFR BLD: 10.5 % OF TOTAL HGB
HCT VFR BLD AUTO: 34.3 % (ref 35–45)
HDLC SERPL-MCNC: 73 MG/DL
HGB BLD-MCNC: 10.2 G/DL (ref 11.7–15.5)
LDLC SERPL CALC-MCNC: 164 MG/DL (CALC)
LYMPHOCYTES # BLD AUTO: 2015 CELLS/UL (ref 850–3900)
LYMPHOCYTES NFR BLD AUTO: 39.5 %
MCH RBC QN AUTO: 24.3 PG (ref 27–33)
MCHC RBC AUTO-ENTMCNC: 29.7 G/DL (ref 32–36)
MCV RBC AUTO: 81.9 FL (ref 80–100)
MONOCYTES # BLD AUTO: 428 CELLS/UL (ref 200–950)
MONOCYTES NFR BLD AUTO: 8.4 %
NEUTROPHILS # BLD AUTO: 2264 CELLS/UL (ref 1500–7800)
NEUTROPHILS NFR BLD AUTO: 44.4 %
NONHDLC SERPL-MCNC: 194 MG/DL (CALC)
PLATELET # BLD AUTO: 584 THOUSAND/UL (ref 140–400)
PMV BLD REES-ECKER: 9.2 FL (ref 7.5–12.5)
POTASSIUM SERPL-SCNC: 4.7 MMOL/L (ref 3.5–5.3)
PROT SERPL-MCNC: 7 G/DL (ref 6.1–8.1)
RBC # BLD AUTO: 4.19 MILLION/UL (ref 3.8–5.1)
SODIUM SERPL-SCNC: 139 MMOL/L (ref 135–146)
T4 FREE SERPL-MCNC: 1.3 NG/DL (ref 0.8–1.8)
TRIGL SERPL-MCNC: 155 MG/DL
TSH SERPL-ACNC: 2.47 MIU/L (ref 0.4–4.5)
WBC # BLD AUTO: 5.1 THOUSAND/UL (ref 3.8–10.8)

## 2021-10-15 LAB
ALBUMIN/CREAT UR: 2 MCG/MG CREAT
APPEARANCE UR: ABNORMAL
BILIRUB UR QL STRIP: NEGATIVE
COLOR UR: YELLOW
CREAT UR-MCNC: 88 MG/DL (ref 20–275)
GLUCOSE UR QL STRIP: NEGATIVE
HGB UR QL STRIP: NEGATIVE
KETONES UR QL STRIP: NEGATIVE
LEUKOCYTE ESTERASE UR QL STRIP: NEGATIVE
MICROALBUMIN UR-MCNC: 0.2 MG/DL
NITRITE UR QL STRIP: POSITIVE
PH UR STRIP: 6.5 [PH] (ref 5–8)
PROT UR QL STRIP: NEGATIVE
SP GR UR STRIP: 1.01 (ref 1–1.03)

## 2021-10-17 ENCOUNTER — PATIENT MESSAGE (OUTPATIENT)
Dept: ADMINISTRATIVE | Facility: HOSPITAL | Age: 56
End: 2021-10-17
Payer: COMMERCIAL

## 2021-10-18 ENCOUNTER — PATIENT MESSAGE (OUTPATIENT)
Dept: ADMINISTRATIVE | Facility: HOSPITAL | Age: 56
End: 2021-10-18
Payer: COMMERCIAL

## 2021-10-19 ENCOUNTER — PATIENT MESSAGE (OUTPATIENT)
Dept: ADMINISTRATIVE | Facility: OTHER | Age: 56
End: 2021-10-19
Payer: COMMERCIAL

## 2021-10-19 ENCOUNTER — OFFICE VISIT (OUTPATIENT)
Dept: INTERNAL MEDICINE | Facility: CLINIC | Age: 56
End: 2021-10-19
Payer: COMMERCIAL

## 2021-10-19 VITALS
BODY MASS INDEX: 31.64 KG/M2 | WEIGHT: 171.94 LBS | RESPIRATION RATE: 16 BRPM | HEIGHT: 62 IN | TEMPERATURE: 98 F | HEART RATE: 68 BPM | SYSTOLIC BLOOD PRESSURE: 132 MMHG | DIASTOLIC BLOOD PRESSURE: 64 MMHG

## 2021-10-19 DIAGNOSIS — I74.10 AORTIC THROMBUS: ICD-10-CM

## 2021-10-19 DIAGNOSIS — I26.99 ACUTE PULMONARY EMBOLISM WITHOUT ACUTE COR PULMONALE, UNSPECIFIED PULMONARY EMBOLISM TYPE: ICD-10-CM

## 2021-10-19 DIAGNOSIS — I10 ESSENTIAL HYPERTENSION: Chronic | ICD-10-CM

## 2021-10-19 DIAGNOSIS — E11.9 TYPE 2 DIABETES MELLITUS WITHOUT COMPLICATION, WITHOUT LONG-TERM CURRENT USE OF INSULIN: Primary | Chronic | ICD-10-CM

## 2021-10-19 DIAGNOSIS — I82.412 DEEP VEIN THROMBOSIS (DVT) OF FEMORAL VEIN OF LEFT LOWER EXTREMITY, UNSPECIFIED CHRONICITY: ICD-10-CM

## 2021-10-19 PROCEDURE — 99999 PR PBB SHADOW E&M-EST. PATIENT-LVL V: CPT | Mod: PBBFAC,,, | Performed by: INTERNAL MEDICINE

## 2021-10-19 PROCEDURE — 99999 PR PBB SHADOW E&M-EST. PATIENT-LVL V: ICD-10-PCS | Mod: PBBFAC,,, | Performed by: INTERNAL MEDICINE

## 2021-10-19 PROCEDURE — 4010F ACE/ARB THERAPY RXD/TAKEN: CPT | Mod: CPTII,S$GLB,, | Performed by: INTERNAL MEDICINE

## 2021-10-19 PROCEDURE — 3061F PR NEG MICROALBUMINURIA RESULT DOCUMENTED/REVIEW: ICD-10-PCS | Mod: CPTII,S$GLB,, | Performed by: INTERNAL MEDICINE

## 2021-10-19 PROCEDURE — 3061F NEG MICROALBUMINURIA REV: CPT | Mod: CPTII,S$GLB,, | Performed by: INTERNAL MEDICINE

## 2021-10-19 PROCEDURE — 99214 PR OFFICE/OUTPT VISIT, EST, LEVL IV, 30-39 MIN: ICD-10-PCS | Mod: S$GLB,,, | Performed by: INTERNAL MEDICINE

## 2021-10-19 PROCEDURE — 3066F NEPHROPATHY DOC TX: CPT | Mod: CPTII,S$GLB,, | Performed by: INTERNAL MEDICINE

## 2021-10-19 PROCEDURE — 99214 OFFICE O/P EST MOD 30 MIN: CPT | Mod: S$GLB,,, | Performed by: INTERNAL MEDICINE

## 2021-10-19 PROCEDURE — 3066F PR DOCUMENTATION OF TREATMENT FOR NEPHROPATHY: ICD-10-PCS | Mod: CPTII,S$GLB,, | Performed by: INTERNAL MEDICINE

## 2021-10-19 PROCEDURE — 4010F PR ACE/ARB THEARPY RXD/TAKEN: ICD-10-PCS | Mod: CPTII,S$GLB,, | Performed by: INTERNAL MEDICINE

## 2021-10-19 RX ORDER — APIXABAN 5 MG/1
5 TABLET, FILM COATED ORAL 2 TIMES DAILY
COMMUNITY
Start: 2021-10-08 | End: 2021-11-03 | Stop reason: SDUPTHER

## 2021-10-26 ENCOUNTER — PATIENT MESSAGE (OUTPATIENT)
Dept: ADMINISTRATIVE | Facility: OTHER | Age: 56
End: 2021-10-26
Payer: COMMERCIAL

## 2021-10-27 ENCOUNTER — PATIENT MESSAGE (OUTPATIENT)
Dept: ADMINISTRATIVE | Facility: OTHER | Age: 56
End: 2021-10-27
Payer: COMMERCIAL

## 2021-11-04 ENCOUNTER — TELEPHONE (OUTPATIENT)
Dept: INTERNAL MEDICINE | Facility: CLINIC | Age: 56
End: 2021-11-04
Payer: COMMERCIAL

## 2022-01-12 ENCOUNTER — PATIENT MESSAGE (OUTPATIENT)
Dept: OTHER | Facility: OTHER | Age: 57
End: 2022-01-12
Payer: COMMERCIAL

## 2022-01-14 LAB
ALBUMIN SERPL-MCNC: 3.9 G/DL (ref 3.6–5.1)
ALBUMIN/CREAT UR: 21 MCG/MG CREAT
ALBUMIN/GLOB SERPL: 1.3 (CALC) (ref 1–2.5)
ALP SERPL-CCNC: 133 U/L (ref 37–153)
ALT SERPL-CCNC: 14 U/L (ref 6–29)
APPEARANCE UR: CLEAR
AST SERPL-CCNC: 14 U/L (ref 10–35)
BASOPHILS # BLD AUTO: 38 CELLS/UL (ref 0–200)
BASOPHILS NFR BLD AUTO: 0.7 %
BILIRUB SERPL-MCNC: 0.4 MG/DL (ref 0.2–1.2)
BILIRUB UR QL STRIP: NEGATIVE
BUN SERPL-MCNC: 8 MG/DL (ref 7–25)
BUN/CREAT SERPL: ABNORMAL (CALC) (ref 6–22)
CALCIUM SERPL-MCNC: 9.5 MG/DL (ref 8.6–10.4)
CHLORIDE SERPL-SCNC: 100 MMOL/L (ref 98–110)
CHOLEST SERPL-MCNC: 243 MG/DL
CHOLEST/HDLC SERPL: 3.2 (CALC)
CO2 SERPL-SCNC: 33 MMOL/L (ref 20–32)
COLOR UR: YELLOW
CREAT SERPL-MCNC: 0.77 MG/DL (ref 0.5–1.05)
CREAT UR-MCNC: 164 MG/DL (ref 20–275)
EOSINOPHIL # BLD AUTO: 189 CELLS/UL (ref 15–500)
EOSINOPHIL NFR BLD AUTO: 3.5 %
ERYTHROCYTE [DISTWIDTH] IN BLOOD BY AUTOMATED COUNT: 13.6 % (ref 11–15)
GLOBULIN SER CALC-MCNC: 3.1 G/DL (CALC) (ref 1.9–3.7)
GLUCOSE SERPL-MCNC: 204 MG/DL (ref 65–99)
GLUCOSE UR QL STRIP: NEGATIVE
HBA1C MFR BLD: 11.4 % OF TOTAL HGB
HCT VFR BLD AUTO: 34.9 % (ref 35–45)
HDLC SERPL-MCNC: 77 MG/DL
HGB BLD-MCNC: 10.8 G/DL (ref 11.7–15.5)
HGB UR QL STRIP: NEGATIVE
KETONES UR QL STRIP: ABNORMAL
LDLC SERPL CALC-MCNC: 140 MG/DL (CALC)
LEUKOCYTE ESTERASE UR QL STRIP: ABNORMAL
LYMPHOCYTES # BLD AUTO: 2808 CELLS/UL (ref 850–3900)
LYMPHOCYTES NFR BLD AUTO: 52 %
MCH RBC QN AUTO: 24.4 PG (ref 27–33)
MCHC RBC AUTO-ENTMCNC: 30.9 G/DL (ref 32–36)
MCV RBC AUTO: 78.8 FL (ref 80–100)
MICROALBUMIN UR-MCNC: 3.4 MG/DL
MONOCYTES # BLD AUTO: 346 CELLS/UL (ref 200–950)
MONOCYTES NFR BLD AUTO: 6.4 %
NEUTROPHILS # BLD AUTO: 2020 CELLS/UL (ref 1500–7800)
NEUTROPHILS NFR BLD AUTO: 37.4 %
NITRITE UR QL STRIP: POSITIVE
NONHDLC SERPL-MCNC: 166 MG/DL (CALC)
PH UR STRIP: 6 [PH] (ref 5–8)
PLATELET # BLD AUTO: 519 THOUSAND/UL (ref 140–400)
PMV BLD REES-ECKER: 9.8 FL (ref 7.5–12.5)
POTASSIUM SERPL-SCNC: 4.3 MMOL/L (ref 3.5–5.3)
PROT SERPL-MCNC: 7 G/DL (ref 6.1–8.1)
PROT UR QL STRIP: ABNORMAL
RBC # BLD AUTO: 4.43 MILLION/UL (ref 3.8–5.1)
SODIUM SERPL-SCNC: 139 MMOL/L (ref 135–146)
SP GR UR STRIP: 1.01 (ref 1–1.03)
TRIGL SERPL-MCNC: 131 MG/DL
WBC # BLD AUTO: 5.4 THOUSAND/UL (ref 3.8–10.8)

## 2022-01-18 ENCOUNTER — OFFICE VISIT (OUTPATIENT)
Dept: INTERNAL MEDICINE | Facility: CLINIC | Age: 57
End: 2022-01-18
Payer: COMMERCIAL

## 2022-01-18 VITALS
BODY MASS INDEX: 32.05 KG/M2 | TEMPERATURE: 98 F | HEIGHT: 62 IN | HEART RATE: 68 BPM | WEIGHT: 174.19 LBS | RESPIRATION RATE: 16 BRPM | SYSTOLIC BLOOD PRESSURE: 138 MMHG | DIASTOLIC BLOOD PRESSURE: 70 MMHG

## 2022-01-18 DIAGNOSIS — I10 ESSENTIAL HYPERTENSION: ICD-10-CM

## 2022-01-18 DIAGNOSIS — E78.49 OTHER HYPERLIPIDEMIA: ICD-10-CM

## 2022-01-18 DIAGNOSIS — E11.9 TYPE 2 DIABETES MELLITUS WITHOUT COMPLICATION, WITHOUT LONG-TERM CURRENT USE OF INSULIN: Primary | ICD-10-CM

## 2022-01-18 PROCEDURE — 1160F PR REVIEW ALL MEDS BY PRESCRIBER/CLIN PHARMACIST DOCUMENTED: ICD-10-PCS | Mod: CPTII,S$GLB,, | Performed by: INTERNAL MEDICINE

## 2022-01-18 PROCEDURE — 3075F PR MOST RECENT SYSTOLIC BLOOD PRESS GE 130-139MM HG: ICD-10-PCS | Mod: CPTII,S$GLB,, | Performed by: INTERNAL MEDICINE

## 2022-01-18 PROCEDURE — 99999 PR PBB SHADOW E&M-EST. PATIENT-LVL V: CPT | Mod: PBBFAC,,, | Performed by: INTERNAL MEDICINE

## 2022-01-18 PROCEDURE — 1160F RVW MEDS BY RX/DR IN RCRD: CPT | Mod: CPTII,S$GLB,, | Performed by: INTERNAL MEDICINE

## 2022-01-18 PROCEDURE — 1159F PR MEDICATION LIST DOCUMENTED IN MEDICAL RECORD: ICD-10-PCS | Mod: CPTII,S$GLB,, | Performed by: INTERNAL MEDICINE

## 2022-01-18 PROCEDURE — 99214 OFFICE O/P EST MOD 30 MIN: CPT | Mod: 25,S$GLB,, | Performed by: INTERNAL MEDICINE

## 2022-01-18 PROCEDURE — 3075F SYST BP GE 130 - 139MM HG: CPT | Mod: CPTII,S$GLB,, | Performed by: INTERNAL MEDICINE

## 2022-01-18 PROCEDURE — 3078F PR MOST RECENT DIASTOLIC BLOOD PRESSURE < 80 MM HG: ICD-10-PCS | Mod: CPTII,S$GLB,, | Performed by: INTERNAL MEDICINE

## 2022-01-18 PROCEDURE — 3078F DIAST BP <80 MM HG: CPT | Mod: CPTII,S$GLB,, | Performed by: INTERNAL MEDICINE

## 2022-01-18 PROCEDURE — 1159F MED LIST DOCD IN RCRD: CPT | Mod: CPTII,S$GLB,, | Performed by: INTERNAL MEDICINE

## 2022-01-18 PROCEDURE — 3061F NEG MICROALBUMINURIA REV: CPT | Mod: CPTII,S$GLB,, | Performed by: INTERNAL MEDICINE

## 2022-01-18 PROCEDURE — 99999 PR PBB SHADOW E&M-EST. PATIENT-LVL V: ICD-10-PCS | Mod: PBBFAC,,, | Performed by: INTERNAL MEDICINE

## 2022-01-18 PROCEDURE — 90471 PNEUMOCOCCAL POLYSACCHARIDE VACCINE 23-VALENT =>2YO SQ IM: ICD-10-PCS | Mod: S$GLB,,, | Performed by: INTERNAL MEDICINE

## 2022-01-18 PROCEDURE — 90732 PNEUMOCOCCAL POLYSACCHARIDE VACCINE 23-VALENT =>2YO SQ IM: ICD-10-PCS | Mod: S$GLB,,, | Performed by: INTERNAL MEDICINE

## 2022-01-18 PROCEDURE — 3066F NEPHROPATHY DOC TX: CPT | Mod: CPTII,S$GLB,, | Performed by: INTERNAL MEDICINE

## 2022-01-18 PROCEDURE — 3061F PR NEG MICROALBUMINURIA RESULT DOCUMENTED/REVIEW: ICD-10-PCS | Mod: CPTII,S$GLB,, | Performed by: INTERNAL MEDICINE

## 2022-01-18 PROCEDURE — 90732 PPSV23 VACC 2 YRS+ SUBQ/IM: CPT | Mod: S$GLB,,, | Performed by: INTERNAL MEDICINE

## 2022-01-18 PROCEDURE — 99214 PR OFFICE/OUTPT VISIT, EST, LEVL IV, 30-39 MIN: ICD-10-PCS | Mod: 25,S$GLB,, | Performed by: INTERNAL MEDICINE

## 2022-01-18 PROCEDURE — 90471 IMMUNIZATION ADMIN: CPT | Mod: S$GLB,,, | Performed by: INTERNAL MEDICINE

## 2022-01-18 PROCEDURE — 3066F PR DOCUMENTATION OF TREATMENT FOR NEPHROPATHY: ICD-10-PCS | Mod: CPTII,S$GLB,, | Performed by: INTERNAL MEDICINE

## 2022-01-18 PROCEDURE — 3008F BODY MASS INDEX DOCD: CPT | Mod: CPTII,S$GLB,, | Performed by: INTERNAL MEDICINE

## 2022-01-18 PROCEDURE — 3008F PR BODY MASS INDEX (BMI) DOCUMENTED: ICD-10-PCS | Mod: CPTII,S$GLB,, | Performed by: INTERNAL MEDICINE

## 2022-01-18 PROCEDURE — 3046F PR MOST RECENT HEMOGLOBIN A1C LEVEL > 9.0%: ICD-10-PCS | Mod: CPTII,S$GLB,, | Performed by: INTERNAL MEDICINE

## 2022-01-18 PROCEDURE — 3046F HEMOGLOBIN A1C LEVEL >9.0%: CPT | Mod: CPTII,S$GLB,, | Performed by: INTERNAL MEDICINE

## 2022-01-18 RX ORDER — ATORVASTATIN CALCIUM 80 MG/1
80 TABLET, FILM COATED ORAL NIGHTLY
Qty: 90 TABLET | Refills: 3 | Status: SHIPPED | OUTPATIENT
Start: 2022-01-18 | End: 2022-05-03 | Stop reason: SDUPTHER

## 2022-01-18 RX ORDER — DAPAGLIFLOZIN 5 MG/1
5 TABLET, FILM COATED ORAL DAILY
Qty: 90 EACH | Refills: 10 | Status: SHIPPED | OUTPATIENT
Start: 2022-01-18 | End: 2022-02-10

## 2022-01-18 NOTE — PROGRESS NOTES
Subjective:       Patient ID: Sharron Redmond is a 56 y.o. female.    Chief Complaint: Diabetes (Does digital diabetes . Going thru a lot of emotional things, sister  before thanksgiving. )    HPI   The patient presents for follow-up of medical conditions which include type 2 diabetes mellitus, hypertension, hyperlipidemia.  The patient has been experiencing domestic stress.  She recently lost her sister, lost her dog, went through a divorce.  She states that she is bouncing back however.  She is not depressed.  She is participating in the digital medicine monitoring program for hypertension and diabetes.  She feels blood pressures have been stable.  Blood sugar levels however remain above 200 while fasting.  Jardiance was recently ordered however it does not appear to be covered by the patient's health plan formulary.  We discussed possibly substituting Farxiga.  The patient is remains active but she does not regularly exercise.  She states she is sleeping more at night since her COVID infection several months ago.  Slight blurring of vision is noted.    The patient has a history of DVT.  Eliquis is being discontinued by her vascular specialist Dr. Davidson at [facility].  She states that she had a follow-up scan of her chest including the aorta.  The scan performed in  did show resolution of thoracic aorta thrombus.  This was felt to be a complication of COVID-19 infection.    The patient was also incidentally noted to have a right middle lobe lung nodule on chest CT.  A follow-up chest CT in 1 year was recommended.    Review of Systems   Constitutional: Negative for activity change, appetite change and unexpected weight change.   Eyes: Negative for visual disturbance.   Respiratory: Negative for shortness of breath.    Cardiovascular: Negative for chest pain, palpitations and leg swelling.   Gastrointestinal: Negative for abdominal pain, blood in stool and diarrhea.   Genitourinary: Negative for dysuria,  frequency, hematuria and urgency.   Neurological: Negative for weakness, numbness and headaches.   Psychiatric/Behavioral: Negative for sleep disturbance.            Physical Exam  Vitals and nursing note reviewed.   Constitutional:       General: She is not in acute distress.     Appearance: She is well-developed and well-nourished.   HENT:      Head: Normocephalic and atraumatic.   Eyes:      General: No scleral icterus.     Extraocular Movements: EOM normal.      Conjunctiva/sclera: Conjunctivae normal.      Pupils: Pupils are equal, round, and reactive to light.   Neck:      Thyroid: No thyromegaly.      Vascular: No JVD.   Cardiovascular:      Rate and Rhythm: Normal rate and regular rhythm.      Pulses: Intact distal pulses.      Heart sounds: Normal heart sounds. No murmur heard.  No friction rub. No gallop.    Pulmonary:      Effort: Pulmonary effort is normal. No respiratory distress.      Breath sounds: Normal breath sounds. No wheezing or rales.   Abdominal:      General: Bowel sounds are normal.      Palpations: Abdomen is soft. There is no mass.      Tenderness: There is no abdominal tenderness.   Musculoskeletal:         General: No tenderness or edema. Normal range of motion.      Cervical back: Normal range of motion and neck supple.      Comments: Back:  Negative straight leg raising test bilaterally.   Lymphadenopathy:      Cervical: No cervical adenopathy.   Skin:     General: Skin is warm and dry.      Findings: No rash.      Comments: No foot lesions are present.   Neurological:      Mental Status: She is alert and oriented to person, place, and time.      Cranial Nerves: No cranial nerve deficit.      Comments: Sensory exam is intact in both feet on monofilament  testing.   Psychiatric:         Mood and Affect: Mood and affect and mood normal.         Behavior: Behavior normal.         Thought Content: Thought content normal.       Protective Sensation (w/ 10 gram monofilament):  Right:  Intact  Left: Intact    Visual Inspection:  Normal -  Bilateral    Pedal Pulses:   Right: Present  Left: Present    Posterior tibialis:   Right:Present  Left: Present        No visits with results within 3 Month(s) from this visit.   Latest known visit with results is:   Clinical Support on 09/20/2021   Component Date Value Ref Range Status    POC Rapid COVID 09/20/2021 Positive* Negative Final     Acceptable 09/20/2021 Yes   Final       Assessment & Plan:      Sharron was seen today for diabetes.  Farxiga will be ordered.  Hopefully this will be covered.  Apparently Jardiance is not covered by her formulary.    The patient has been encouraged to increase exercise level and to lose weight.  Routine medications will be continued.  She was advised to resume atorvastatin 80 mg daily.  Apparently this prescription had run out.  Lab studies will be repeated in 3 months.  Pneumovax will be administered today.    Diagnoses and all orders for this visit:    Type 2 diabetes mellitus without complication, without long-term current use of insulin  -     dapagliflozin (FARXIGA) 5 mg Tab tablet; Take 1 tablet (5 mg total) by mouth once daily. For diabetes control.    Essential hypertension    Other hyperlipidemia    Other orders  -     atorvastatin (LIPITOR) 80 MG tablet; Take 1 tablet (80 mg total) by mouth every evening. For cholesterol control  -     (In Office Administered) Pneumococcal Polysaccharide Vaccine (23 Valent) (SQ/IM)         Follow up in about 3 months (around 4/18/2022).     Eric Najera MD

## 2022-02-02 ENCOUNTER — TELEPHONE (OUTPATIENT)
Dept: INTERNAL MEDICINE | Facility: CLINIC | Age: 57
End: 2022-02-02
Payer: COMMERCIAL

## 2022-02-02 NOTE — TELEPHONE ENCOUNTER
Fax from [facility] [phone] states jardiance not covered by insruance.        Covered alternatives are metformin and levemir flextouch.  (she is on metformin already)    mutliple other alternatives all require prior auth including farxiga rx'd recently.  She has not started that.  (ozempic,basaglar dwikpen, invokana, tradjenta, victoza,lantus solostar, farxiga, trigardy, anuvia, trulicity)     Is on  ?    Metformin is on allergy list.    Insurance   Id# [id number] I was holding and after 20 min disconnected.    What happened with bydurion angus rx'd?  That was suppose to be tried before they cover several other meds.    farxiga cover my meds key vd   [id number].

## 2022-02-14 NOTE — TELEPHONE ENCOUNTER
I rcvd questions to answer for prior auth on steglatro 5mg/ ertugliflozin/ jardiance.  Faxed back.    Tried farxiga, does not have ckd. Does not have chf. Does thvae contraindication to metformin (is allergic)  Tolerates xr only.  Is on metformin er and steglatro.  Tried metformin, glumetza, glucovance, glyburide-metformin.   Has not tried invokana, invokamet xr.     Prior auth approved.  [facility]  Good till 2/11/23  [facility]

## 2022-04-08 ENCOUNTER — TELEPHONE (OUTPATIENT)
Dept: INTERNAL MEDICINE | Facility: CLINIC | Age: 57
End: 2022-04-08
Payer: COMMERCIAL

## 2022-04-08 NOTE — TELEPHONE ENCOUNTER
Fax received from [facility] 3/31 need prior auth for metformin er 750mg.     Id# [id number]  Phone  [facility]    Allergic to plain metformin.   Tolerates xr metformin.    Did plain metformin 5/15-10/17.  Er  Started 10/17-3/22  Added glipizide recently..    Med history shows tried:.  trulicity 6/20-7/20  jardiance 2/22 not covered/ pa denied  bydureon 7/20   januvia 50mg 8/19- 2/20    aic 11.4    6 mos ago 9/9      Insurance  transferred me to .  Her insurance only covers metformin er 750mg 2 daily.  They have limitation of max 2 daily.  Denied our request for more.  Case# [id number]    3/31 digital med did the 3 pills daily. She  filled thru [employer] to help with cost.  So is doing meds and tolerating.

## 2022-04-21 LAB
ALBUMIN SERPL-MCNC: 3.9 G/DL (ref 3.6–5.1)
ALBUMIN/GLOB SERPL: 1.3 (CALC) (ref 1–2.5)
ALP SERPL-CCNC: 133 U/L (ref 37–153)
ALT SERPL-CCNC: 11 U/L (ref 6–29)
AST SERPL-CCNC: 13 U/L (ref 10–35)
BASOPHILS # BLD AUTO: 19 CELLS/UL (ref 0–200)
BASOPHILS NFR BLD AUTO: 0.4 %
BILIRUB SERPL-MCNC: 0.5 MG/DL (ref 0.2–1.2)
BUN SERPL-MCNC: 8 MG/DL (ref 7–25)
BUN/CREAT SERPL: ABNORMAL (CALC) (ref 6–22)
CALCIUM SERPL-MCNC: 9.3 MG/DL (ref 8.6–10.4)
CHLORIDE SERPL-SCNC: 102 MMOL/L (ref 98–110)
CHOLEST SERPL-MCNC: 259 MG/DL
CHOLEST/HDLC SERPL: 3.6 (CALC)
CO2 SERPL-SCNC: 29 MMOL/L (ref 20–32)
CREAT SERPL-MCNC: 0.81 MG/DL (ref 0.5–1.05)
EOSINOPHIL # BLD AUTO: 141 CELLS/UL (ref 15–500)
EOSINOPHIL NFR BLD AUTO: 3 %
ERYTHROCYTE [DISTWIDTH] IN BLOOD BY AUTOMATED COUNT: 16.5 % (ref 11–15)
GLOBULIN SER CALC-MCNC: 3.1 G/DL (CALC) (ref 1.9–3.7)
GLUCOSE SERPL-MCNC: 218 MG/DL (ref 65–99)
HBA1C MFR BLD: 11.4 % OF TOTAL HGB
HCT VFR BLD AUTO: 34.3 % (ref 35–45)
HDLC SERPL-MCNC: 71 MG/DL
HGB BLD-MCNC: 10.7 G/DL (ref 11.7–15.5)
LDLC SERPL CALC-MCNC: 157 MG/DL (CALC)
LYMPHOCYTES # BLD AUTO: 2143 CELLS/UL (ref 850–3900)
LYMPHOCYTES NFR BLD AUTO: 45.6 %
MCH RBC QN AUTO: 23.8 PG (ref 27–33)
MCHC RBC AUTO-ENTMCNC: 31.2 G/DL (ref 32–36)
MCV RBC AUTO: 76.4 FL (ref 80–100)
MONOCYTES # BLD AUTO: 291 CELLS/UL (ref 200–950)
MONOCYTES NFR BLD AUTO: 6.2 %
NEUTROPHILS # BLD AUTO: 2106 CELLS/UL (ref 1500–7800)
NEUTROPHILS NFR BLD AUTO: 44.8 %
NONHDLC SERPL-MCNC: 188 MG/DL (CALC)
PLATELET # BLD AUTO: 311 THOUSAND/UL (ref 140–400)
PMV BLD REES-ECKER: 10.2 FL (ref 7.5–12.5)
POTASSIUM SERPL-SCNC: 4 MMOL/L (ref 3.5–5.3)
PROT SERPL-MCNC: 7 G/DL (ref 6.1–8.1)
RBC # BLD AUTO: 4.49 MILLION/UL (ref 3.8–5.1)
SODIUM SERPL-SCNC: 138 MMOL/L (ref 135–146)
TRIGL SERPL-MCNC: 175 MG/DL
WBC # BLD AUTO: 4.7 THOUSAND/UL (ref 3.8–10.8)

## 2022-05-03 ENCOUNTER — OFFICE VISIT (OUTPATIENT)
Dept: INTERNAL MEDICINE | Facility: CLINIC | Age: 57
End: 2022-05-03
Payer: COMMERCIAL

## 2022-05-03 VITALS
HEART RATE: 111 BPM | BODY MASS INDEX: 33.1 KG/M2 | HEIGHT: 62 IN | OXYGEN SATURATION: 98 % | WEIGHT: 179.88 LBS | SYSTOLIC BLOOD PRESSURE: 140 MMHG | DIASTOLIC BLOOD PRESSURE: 60 MMHG | TEMPERATURE: 98 F

## 2022-05-03 DIAGNOSIS — E78.49 OTHER HYPERLIPIDEMIA: ICD-10-CM

## 2022-05-03 DIAGNOSIS — I10 ESSENTIAL HYPERTENSION: ICD-10-CM

## 2022-05-03 DIAGNOSIS — E66.9 CLASS 1 OBESITY WITH SERIOUS COMORBIDITY AND BODY MASS INDEX (BMI) OF 32.0 TO 32.9 IN ADULT, UNSPECIFIED OBESITY TYPE: ICD-10-CM

## 2022-05-03 DIAGNOSIS — E11.9 TYPE 2 DIABETES MELLITUS WITHOUT COMPLICATION, WITHOUT LONG-TERM CURRENT USE OF INSULIN: Primary | ICD-10-CM

## 2022-05-03 PROCEDURE — 99999 PR PBB SHADOW E&M-EST. PATIENT-LVL V: ICD-10-PCS | Mod: PBBFAC,,, | Performed by: INTERNAL MEDICINE

## 2022-05-03 PROCEDURE — 3077F SYST BP >= 140 MM HG: CPT | Mod: CPTII,S$GLB,, | Performed by: INTERNAL MEDICINE

## 2022-05-03 PROCEDURE — 3046F PR MOST RECENT HEMOGLOBIN A1C LEVEL > 9.0%: ICD-10-PCS | Mod: CPTII,S$GLB,, | Performed by: INTERNAL MEDICINE

## 2022-05-03 PROCEDURE — 3066F PR DOCUMENTATION OF TREATMENT FOR NEPHROPATHY: ICD-10-PCS | Mod: CPTII,S$GLB,, | Performed by: INTERNAL MEDICINE

## 2022-05-03 PROCEDURE — 3046F HEMOGLOBIN A1C LEVEL >9.0%: CPT | Mod: CPTII,S$GLB,, | Performed by: INTERNAL MEDICINE

## 2022-05-03 PROCEDURE — 3078F PR MOST RECENT DIASTOLIC BLOOD PRESSURE < 80 MM HG: ICD-10-PCS | Mod: CPTII,S$GLB,, | Performed by: INTERNAL MEDICINE

## 2022-05-03 PROCEDURE — 99999 PR PBB SHADOW E&M-EST. PATIENT-LVL V: CPT | Mod: PBBFAC,,, | Performed by: INTERNAL MEDICINE

## 2022-05-03 PROCEDURE — 3066F NEPHROPATHY DOC TX: CPT | Mod: CPTII,S$GLB,, | Performed by: INTERNAL MEDICINE

## 2022-05-03 PROCEDURE — 3061F PR NEG MICROALBUMINURIA RESULT DOCUMENTED/REVIEW: ICD-10-PCS | Mod: CPTII,S$GLB,, | Performed by: INTERNAL MEDICINE

## 2022-05-03 PROCEDURE — 99214 OFFICE O/P EST MOD 30 MIN: CPT | Mod: S$GLB,,, | Performed by: INTERNAL MEDICINE

## 2022-05-03 PROCEDURE — 3077F PR MOST RECENT SYSTOLIC BLOOD PRESSURE >= 140 MM HG: ICD-10-PCS | Mod: CPTII,S$GLB,, | Performed by: INTERNAL MEDICINE

## 2022-05-03 PROCEDURE — 3008F BODY MASS INDEX DOCD: CPT | Mod: CPTII,S$GLB,, | Performed by: INTERNAL MEDICINE

## 2022-05-03 PROCEDURE — 3078F DIAST BP <80 MM HG: CPT | Mod: CPTII,S$GLB,, | Performed by: INTERNAL MEDICINE

## 2022-05-03 PROCEDURE — 99214 PR OFFICE/OUTPT VISIT, EST, LEVL IV, 30-39 MIN: ICD-10-PCS | Mod: S$GLB,,, | Performed by: INTERNAL MEDICINE

## 2022-05-03 PROCEDURE — 1160F PR REVIEW ALL MEDS BY PRESCRIBER/CLIN PHARMACIST DOCUMENTED: ICD-10-PCS | Mod: CPTII,S$GLB,, | Performed by: INTERNAL MEDICINE

## 2022-05-03 PROCEDURE — 3008F PR BODY MASS INDEX (BMI) DOCUMENTED: ICD-10-PCS | Mod: CPTII,S$GLB,, | Performed by: INTERNAL MEDICINE

## 2022-05-03 PROCEDURE — 1159F PR MEDICATION LIST DOCUMENTED IN MEDICAL RECORD: ICD-10-PCS | Mod: CPTII,S$GLB,, | Performed by: INTERNAL MEDICINE

## 2022-05-03 PROCEDURE — 3061F NEG MICROALBUMINURIA REV: CPT | Mod: CPTII,S$GLB,, | Performed by: INTERNAL MEDICINE

## 2022-05-03 PROCEDURE — 1159F MED LIST DOCD IN RCRD: CPT | Mod: CPTII,S$GLB,, | Performed by: INTERNAL MEDICINE

## 2022-05-03 PROCEDURE — 1160F RVW MEDS BY RX/DR IN RCRD: CPT | Mod: CPTII,S$GLB,, | Performed by: INTERNAL MEDICINE

## 2022-05-03 RX ORDER — ATORVASTATIN CALCIUM 80 MG/1
80 TABLET, FILM COATED ORAL NIGHTLY
Qty: 90 TABLET | Refills: 3 | Status: SHIPPED | OUTPATIENT
Start: 2022-05-03 | End: 2022-09-06 | Stop reason: SDUPTHER

## 2022-05-03 NOTE — PROGRESS NOTES
Subjective:       Patient ID: Sharron Redmond is a 56 y.o. female.    Chief Complaint: Follow-up    HPI   The patient presents for follow-up of her medical conditions which include the 2 diabetes mellitus, hypertension, hyperlipidemia, and obesity.  The patient is followed by the [facility] the program for hypertension and diabetes.  Her compliance with medication however is questionable.  She states she does not feel she has been using the atorvastatin.  She has not yet started spironolactone as prescribed.  She recently changed her work schedule; she works overnight.  She feels this is preferable that allows her to be more compliant with her diet and medications.  She has recently experienced some personal stress with the loss of her sister.  She denies feeling depressed at the present time.    She has not experienced any hypoglycemic symptoms.  She states blood sugar levels have been elevated.  She checks her blood sugar levels twice a day.  Blood pressure levels have also been elevated.    She has any joint stiffness in the knees.  She uses local heat to her joints.  She does not desire in the prescription medication for her knee joint pain.    She has noted unspecified weight gain.    Review of Systems   Constitutional: Positive for activity change. Negative for appetite change and unexpected weight change.   Eyes: Negative for visual disturbance.   Respiratory: Negative for shortness of breath.    Cardiovascular: Negative for chest pain, palpitations and leg swelling.   Gastrointestinal: Negative for abdominal pain, blood in stool and diarrhea.   Genitourinary: Negative for dysuria, frequency, hematuria and urgency.   Musculoskeletal: Positive for arthralgias. Negative for back pain and joint swelling.   Neurological: Negative for weakness, numbness and headaches.   Psychiatric/Behavioral: Negative for sleep disturbance.            Physical Exam  Vitals and nursing note reviewed.   Constitutional:        General: She is not in acute distress.     Appearance: She is well-developed.   HENT:      Head: Normocephalic and atraumatic.   Eyes:      General: No scleral icterus.     Conjunctiva/sclera: Conjunctivae normal.      Pupils: Pupils are equal, round, and reactive to light.   Neck:      Thyroid: No thyromegaly.      Vascular: No JVD.   Cardiovascular:      Rate and Rhythm: Normal rate and regular rhythm.      Heart sounds: Normal heart sounds. No murmur heard.    No friction rub. No gallop.   Pulmonary:      Effort: Pulmonary effort is normal. No respiratory distress.      Breath sounds: Normal breath sounds. No wheezing or rales.   Abdominal:      General: Bowel sounds are normal.      Palpations: Abdomen is soft. There is no mass.      Tenderness: There is no abdominal tenderness.   Musculoskeletal:         General: No tenderness. Normal range of motion.      Cervical back: Normal range of motion and neck supple.   Lymphadenopathy:      Cervical: No cervical adenopathy.   Skin:     General: Skin is warm and dry.      Findings: No rash.      Comments: No foot lesions are present.   Neurological:      Mental Status: She is alert and oriented to person, place, and time.      Cranial Nerves: No cranial nerve deficit.      Comments: Sensory exam is intact in both feet on monofilament  testing.   Psychiatric:         Behavior: Behavior normal.         Protective Sensation (w/ 10 gram monofilament):  Right: Intact  Left: Intact    Visual Inspection:  Normal -  Bilateral    Pedal Pulses:   Right: Present  Left: Present    Posterior tibialis:   Right:Present  Left: Present       No visits with results within 3 Month(s) from this visit.   Latest known visit with results is:   Office Visit on 01/18/2022   Component Date Value Ref Range Status    Glucose 04/20/2022 218 (A) 65 - 99 mg/dL Final    Comment:               Fasting reference interval     For someone without known diabetes, a glucose  value >125 mg/dL indicates  that they may have  diabetes and this should be confirmed with a  follow-up test.         BUN 04/20/2022 8  7 - 25 mg/dL Final    Creatinine 04/20/2022 0.81  0.50 - 1.05 mg/dL Final    Comment: For patients >49 years of age, the reference limit  for Creatinine is approximately 13% higher for people  identified as -American.         eGFR if non African American 04/20/2022 81  > OR = 60 mL/min/1.73m2 Final    eGFR if  04/20/2022 93  > OR = 60 mL/min/1.73m2 Final    BUN/Creatinine Ratio 04/20/2022 NOT APPLICABLE  6 - 22 (calc) Final    Sodium 04/20/2022 138  135 - 146 mmol/L Final    Potassium 04/20/2022 4.0  3.5 - 5.3 mmol/L Final    Chloride 04/20/2022 102  98 - 110 mmol/L Final    CO2 04/20/2022 29  20 - 32 mmol/L Final    Calcium 04/20/2022 9.3  8.6 - 10.4 mg/dL Final    Total Protein 04/20/2022 7.0  6.1 - 8.1 g/dL Final    Albumin 04/20/2022 3.9  3.6 - 5.1 g/dL Final    Globulin, Total 04/20/2022 3.1  1.9 - 3.7 g/dL (calc) Final    Albumin/Globulin Ratio 04/20/2022 1.3  1.0 - 2.5 (calc) Final    Total Bilirubin 04/20/2022 0.5  0.2 - 1.2 mg/dL Final    Alkaline Phosphatase 04/20/2022 133  37 - 153 U/L Final    AST 04/20/2022 13  10 - 35 U/L Final    ALT 04/20/2022 11  6 - 29 U/L Final    Cholesterol 04/20/2022 259 (A) <200 mg/dL Final    HDL 04/20/2022 71  > OR = 50 mg/dL Final    Triglycerides 04/20/2022 175 (A) <150 mg/dL Final    LDL Cholesterol 04/20/2022 157 (A) mg/dL (calc) Final    Comment: Reference range: <100     Desirable range <100 mg/dL for primary prevention;    <70 mg/dL for patients with CHD or diabetic patients   with > or = 2 CHD risk factors.     LDL-C is now calculated using the Brandon-Rufino   calculation, which is a validated novel method providing   better accuracy than the Friedewald equation in the   estimation of LDL-C.   Brandon CARREON et al. THANH. 2013;310(50): 5932-6512   (http://education.VitalsGuard.ShelfX/faq/CNY003)      HDL/Cholesterol  Ratio 04/20/2022 3.6  <5.0 (calc) Final    Non HDL Chol. (LDL+VLDL) 04/20/2022 188 (A) <130 mg/dL (calc) Final    Comment: For patients with diabetes plus 1 major ASCVD risk   factor, treating to a non-HDL-C goal of <100 mg/dL   (LDL-C of <70 mg/dL) is considered a therapeutic   option.      WBC 04/20/2022 4.7  3.8 - 10.8 Thousand/uL Final    RBC 04/20/2022 4.49  3.80 - 5.10 Million/uL Final    Hemoglobin 04/20/2022 10.7 (A) 11.7 - 15.5 g/dL Final    Hematocrit 04/20/2022 34.3 (A) 35.0 - 45.0 % Final    MCV 04/20/2022 76.4 (A) 80.0 - 100.0 fL Final    MCH 04/20/2022 23.8 (A) 27.0 - 33.0 pg Final    MCHC 04/20/2022 31.2 (A) 32.0 - 36.0 g/dL Final    RDW 04/20/2022 16.5 (A) 11.0 - 15.0 % Final    Platelets 04/20/2022 311  140 - 400 Thousand/uL Final    MPV 04/20/2022 10.2  7.5 - 12.5 fL Final    Neutrophils, Abs 04/20/2022 2,106  1,500 - 7,800 cells/uL Final    Lymph # 04/20/2022 2,143  850 - 3,900 cells/uL Final    Mono # 04/20/2022 291  200 - 950 cells/uL Final    Eos # 04/20/2022 141  15 - 500 cells/uL Final    Baso # 04/20/2022 19  0 - 200 cells/uL Final    Neutrophils Relative 04/20/2022 44.8  % Final    Lymph % 04/20/2022 45.6  % Final    Mono % 04/20/2022 6.2  % Final    Eosinophil % 04/20/2022 3.0  % Final    Basophil % 04/20/2022 0.4  % Final    Hemoglobin A1C 04/20/2022 11.4 (A) <5.7 % of total Hgb Final    Comment: For someone without known diabetes, a hemoglobin A1c  value of 6.5% or greater indicates that they may have   diabetes and this should be confirmed with a follow-up   test.     For someone with known diabetes, a value <7% indicates   that their diabetes is well controlled and a value   greater than or equal to 7% indicates suboptimal   control. A1c targets should be individualized based on   duration of diabetes, age, comorbid conditions, and   other considerations.     Currently, no consensus exists regarding use of  hemoglobin A1c for diagnosis of diabetes for children.              Assessment & Plan:      Sharron was seen today for follow-up.  The patient was encouraged to begin spironolactone as prescribed for better blood pressure control.  The atorvastatin will be reordered.  The patient was also encouraged to get an eye examination.    We discussed obtaining: COVID-19, tetanus, and shingles vaccines through her local pharmacy.    Fasting blood tests will be obtained in 4 months.  We discussed the option of using Ozempici injection once a week.  This should help her diabetes as well as help her with weight loss.  The patient states she will consider this treatment option.  She has requested more time to allow herself to get settled .    Diagnoses and all orders for this visit:    Type 2 diabetes mellitus without complication, without long-term current use of insulin    Essential hypertension    Other hyperlipidemia    Other orders  -     atorvastatin (LIPITOR) 80 MG tablet; Take 1 tablet (80 mg total) by mouth every evening. For cholesterol control         Follow up in about 4 months (around 9/3/2022).     Eric Najera MD

## 2022-05-10 ENCOUNTER — PATIENT MESSAGE (OUTPATIENT)
Dept: OTHER | Facility: OTHER | Age: 57
End: 2022-05-10
Payer: COMMERCIAL

## 2022-07-07 ENCOUNTER — PATIENT MESSAGE (OUTPATIENT)
Dept: ADMINISTRATIVE | Facility: HOSPITAL | Age: 57
End: 2022-07-07
Payer: COMMERCIAL

## 2022-07-07 ENCOUNTER — PATIENT OUTREACH (OUTPATIENT)
Dept: ADMINISTRATIVE | Facility: HOSPITAL | Age: 57
End: 2022-07-07
Payer: COMMERCIAL

## 2022-09-02 LAB
ALBUMIN SERPL-MCNC: 3.8 G/DL (ref 3.6–5.1)
ALBUMIN/GLOB SERPL: 1.2 (CALC) (ref 1–2.5)
ALP SERPL-CCNC: 135 U/L (ref 37–153)
ALT SERPL-CCNC: 11 U/L (ref 6–29)
AST SERPL-CCNC: 11 U/L (ref 10–35)
BILIRUB SERPL-MCNC: 0.4 MG/DL (ref 0.2–1.2)
BUN SERPL-MCNC: 14 MG/DL (ref 7–25)
BUN/CREAT SERPL: ABNORMAL (CALC) (ref 6–22)
CALCIUM SERPL-MCNC: 9.3 MG/DL (ref 8.6–10.4)
CHLORIDE SERPL-SCNC: 101 MMOL/L (ref 98–110)
CHOLEST SERPL-MCNC: 259 MG/DL
CHOLEST/HDLC SERPL: 3.5 (CALC)
CO2 SERPL-SCNC: 28 MMOL/L (ref 20–32)
CREAT SERPL-MCNC: 0.75 MG/DL (ref 0.5–1.03)
EGFR: 92 ML/MIN/1.73M2
GLOBULIN SER CALC-MCNC: 3.2 G/DL (CALC) (ref 1.9–3.7)
GLUCOSE SERPL-MCNC: 263 MG/DL (ref 65–99)
HBA1C MFR BLD: 12 % OF TOTAL HGB
HDLC SERPL-MCNC: 75 MG/DL
LDLC SERPL CALC-MCNC: 158 MG/DL (CALC)
NONHDLC SERPL-MCNC: 184 MG/DL (CALC)
POTASSIUM SERPL-SCNC: 4 MMOL/L (ref 3.5–5.3)
PROT SERPL-MCNC: 7 G/DL (ref 6.1–8.1)
SODIUM SERPL-SCNC: 138 MMOL/L (ref 135–146)
TRIGL SERPL-MCNC: 140 MG/DL

## 2022-09-06 ENCOUNTER — PATIENT MESSAGE (OUTPATIENT)
Dept: INTERNAL MEDICINE | Facility: CLINIC | Age: 57
End: 2022-09-06

## 2022-09-06 ENCOUNTER — OFFICE VISIT (OUTPATIENT)
Dept: INTERNAL MEDICINE | Facility: CLINIC | Age: 57
End: 2022-09-06
Payer: COMMERCIAL

## 2022-09-06 VITALS
SYSTOLIC BLOOD PRESSURE: 148 MMHG | WEIGHT: 180.75 LBS | TEMPERATURE: 94 F | HEIGHT: 62 IN | BODY MASS INDEX: 33.26 KG/M2 | RESPIRATION RATE: 16 BRPM | HEART RATE: 72 BPM | DIASTOLIC BLOOD PRESSURE: 68 MMHG

## 2022-09-06 DIAGNOSIS — E11.9 TYPE 2 DIABETES MELLITUS WITHOUT COMPLICATION, WITHOUT LONG-TERM CURRENT USE OF INSULIN: Primary | Chronic | ICD-10-CM

## 2022-09-06 DIAGNOSIS — E66.09 CLASS 1 OBESITY DUE TO EXCESS CALORIES WITH SERIOUS COMORBIDITY AND BODY MASS INDEX (BMI) OF 33.0 TO 33.9 IN ADULT: ICD-10-CM

## 2022-09-06 DIAGNOSIS — Z12.31 ENCOUNTER FOR SCREENING MAMMOGRAM FOR BREAST CANCER: ICD-10-CM

## 2022-09-06 DIAGNOSIS — I10 ESSENTIAL HYPERTENSION: Chronic | ICD-10-CM

## 2022-09-06 DIAGNOSIS — E78.49 OTHER HYPERLIPIDEMIA: ICD-10-CM

## 2022-09-06 PROCEDURE — 3046F PR MOST RECENT HEMOGLOBIN A1C LEVEL > 9.0%: ICD-10-PCS | Mod: CPTII,S$GLB,, | Performed by: INTERNAL MEDICINE

## 2022-09-06 PROCEDURE — 3008F PR BODY MASS INDEX (BMI) DOCUMENTED: ICD-10-PCS | Mod: CPTII,S$GLB,, | Performed by: INTERNAL MEDICINE

## 2022-09-06 PROCEDURE — 3077F SYST BP >= 140 MM HG: CPT | Mod: CPTII,S$GLB,, | Performed by: INTERNAL MEDICINE

## 2022-09-06 PROCEDURE — 99214 PR OFFICE/OUTPT VISIT, EST, LEVL IV, 30-39 MIN: ICD-10-PCS | Mod: S$GLB,,, | Performed by: INTERNAL MEDICINE

## 2022-09-06 PROCEDURE — 3077F PR MOST RECENT SYSTOLIC BLOOD PRESSURE >= 140 MM HG: ICD-10-PCS | Mod: CPTII,S$GLB,, | Performed by: INTERNAL MEDICINE

## 2022-09-06 PROCEDURE — 3008F BODY MASS INDEX DOCD: CPT | Mod: CPTII,S$GLB,, | Performed by: INTERNAL MEDICINE

## 2022-09-06 PROCEDURE — 3078F PR MOST RECENT DIASTOLIC BLOOD PRESSURE < 80 MM HG: ICD-10-PCS | Mod: CPTII,S$GLB,, | Performed by: INTERNAL MEDICINE

## 2022-09-06 PROCEDURE — 3066F NEPHROPATHY DOC TX: CPT | Mod: CPTII,S$GLB,, | Performed by: INTERNAL MEDICINE

## 2022-09-06 PROCEDURE — 1159F PR MEDICATION LIST DOCUMENTED IN MEDICAL RECORD: ICD-10-PCS | Mod: CPTII,S$GLB,, | Performed by: INTERNAL MEDICINE

## 2022-09-06 PROCEDURE — 3061F PR NEG MICROALBUMINURIA RESULT DOCUMENTED/REVIEW: ICD-10-PCS | Mod: CPTII,S$GLB,, | Performed by: INTERNAL MEDICINE

## 2022-09-06 PROCEDURE — 3061F NEG MICROALBUMINURIA REV: CPT | Mod: CPTII,S$GLB,, | Performed by: INTERNAL MEDICINE

## 2022-09-06 PROCEDURE — 99999 PR PBB SHADOW E&M-EST. PATIENT-LVL V: ICD-10-PCS | Mod: PBBFAC,,, | Performed by: INTERNAL MEDICINE

## 2022-09-06 PROCEDURE — 99999 PR PBB SHADOW E&M-EST. PATIENT-LVL V: CPT | Mod: PBBFAC,,, | Performed by: INTERNAL MEDICINE

## 2022-09-06 PROCEDURE — 1160F RVW MEDS BY RX/DR IN RCRD: CPT | Mod: CPTII,S$GLB,, | Performed by: INTERNAL MEDICINE

## 2022-09-06 PROCEDURE — 1159F MED LIST DOCD IN RCRD: CPT | Mod: CPTII,S$GLB,, | Performed by: INTERNAL MEDICINE

## 2022-09-06 PROCEDURE — 3066F PR DOCUMENTATION OF TREATMENT FOR NEPHROPATHY: ICD-10-PCS | Mod: CPTII,S$GLB,, | Performed by: INTERNAL MEDICINE

## 2022-09-06 PROCEDURE — 3046F HEMOGLOBIN A1C LEVEL >9.0%: CPT | Mod: CPTII,S$GLB,, | Performed by: INTERNAL MEDICINE

## 2022-09-06 PROCEDURE — 1160F PR REVIEW ALL MEDS BY PRESCRIBER/CLIN PHARMACIST DOCUMENTED: ICD-10-PCS | Mod: CPTII,S$GLB,, | Performed by: INTERNAL MEDICINE

## 2022-09-06 PROCEDURE — 99214 OFFICE O/P EST MOD 30 MIN: CPT | Mod: S$GLB,,, | Performed by: INTERNAL MEDICINE

## 2022-09-06 PROCEDURE — 3078F DIAST BP <80 MM HG: CPT | Mod: CPTII,S$GLB,, | Performed by: INTERNAL MEDICINE

## 2022-09-06 RX ORDER — ATORVASTATIN CALCIUM 80 MG/1
80 TABLET, FILM COATED ORAL NIGHTLY
Qty: 90 TABLET | Refills: 3 | Status: SHIPPED | OUTPATIENT
Start: 2022-09-06 | End: 2023-10-22

## 2022-09-06 RX ORDER — AMLODIPINE BESYLATE 10 MG/1
10 TABLET ORAL DAILY
Qty: 90 TABLET | Refills: 3 | Status: SHIPPED | OUTPATIENT
Start: 2022-09-06 | End: 2023-07-19

## 2022-09-06 RX ORDER — CHLORTHALIDONE 25 MG/1
25 TABLET ORAL DAILY
Qty: 90 TABLET | Refills: 3 | Status: SHIPPED | OUTPATIENT
Start: 2022-09-06 | End: 2023-07-19

## 2022-09-06 RX ORDER — DAPAGLIFLOZIN 5 MG/1
5 TABLET, FILM COATED ORAL DAILY
Qty: 30 TABLET | Refills: 6 | Status: SHIPPED | OUTPATIENT
Start: 2022-09-06 | End: 2022-10-05

## 2022-09-06 RX ORDER — SPIRONOLACTONE 50 MG/1
50 TABLET, FILM COATED ORAL DAILY
Qty: 90 TABLET | Refills: 3 | Status: SHIPPED | OUTPATIENT
Start: 2022-09-06 | End: 2023-07-19

## 2022-09-06 NOTE — PROGRESS NOTES
Subjective:       Patient ID: Sharron Redmond is a 57 y.o. female.    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  )    HPI  The patient presents for follow-up of medical conditions which include type 2 diabetes mellitus, hypertension, hyperlipidemia, and obesity.    The patient reports blood sugar levels have been in the 200+ range.  No hypoglycemia has been noted.  The patient is participating in the digital medicine program for hypertension and diabetes.  She states her recent blood pressure readings have been inaccurate on self measurement.  Her blood pressure monitor is malfunctioning.  She will get a new monitor for home use.    She has been under some emotional stress lately.  She is experiencing increased anxiety with the approach of the anniversary of her sister's death on 11/24/2021.    Review of Systems   Constitutional:  Negative for activity change, appetite change and unexpected weight change.   Eyes:  Negative for visual disturbance.   Respiratory:  Negative for shortness of breath.    Cardiovascular:  Negative for chest pain, palpitations and leg swelling.   Gastrointestinal:  Negative for abdominal pain, blood in stool and diarrhea.   Genitourinary:  Negative for dysuria, frequency, hematuria and urgency.   Neurological:  Negative for weakness, numbness and headaches.   Psychiatric/Behavioral:  Negative for sleep disturbance.           Physical Exam  Vitals and nursing note reviewed.   Constitutional:       General: She is not in acute distress.     Appearance: Normal appearance. She is well-developed.   HENT:      Head: Normocephalic and atraumatic.   Eyes:      General: No scleral icterus.     Extraocular Movements: Extraocular movements intact.      Conjunctiva/sclera: Conjunctivae normal.   Neck:      Thyroid: No thyromegaly.      Vascular: No JVD.   Cardiovascular:      Rate and Rhythm: Normal rate and regular rhythm.      Heart sounds: Normal heart sounds. No murmur heard.     No friction rub. No gallop.   Pulmonary:      Effort: Pulmonary effort is normal. No respiratory distress.      Breath sounds: Normal breath sounds. No wheezing or rales.   Abdominal:      General: Bowel sounds are normal.      Palpations: Abdomen is soft. There is no mass.      Tenderness: There is no abdominal tenderness.   Musculoskeletal:         General: No tenderness. Normal range of motion.      Cervical back: Normal range of motion and neck supple.   Lymphadenopathy:      Cervical: No cervical adenopathy.   Skin:     General: Skin is warm and dry.      Findings: No rash.      Comments: No foot lesions are present.   Neurological:      Mental Status: She is alert and oriented to person, place, and time.      Cranial Nerves: No cranial nerve deficit.      Comments: Sensory exam is intact in both feet on monofilament testing.   Psychiatric:         Mood and Affect: Mood normal.         Behavior: Behavior normal.       Protective Sensation (w/ 10 gram monofilament):  Right: Intact  Left: Intact    Visual Inspection:  Normal -  Bilateral    Pedal Pulses:   Right: Present  Left: Present    Posterior tibialis:   Right:Present  Left: Present      Ancillary Orders on 06/10/2022   Component Date Value Ref Range Status    Glucose 08/03/2022 224 (H)  65 - 99 mg/dL Final    Comment:               Fasting reference interval     For someone without known diabetes, a glucose  value >125 mg/dL indicates that they may have  diabetes and this should be confirmed with a  follow-up test.         BUN 08/03/2022 9  7 - 25 mg/dL Final    Creatinine 08/03/2022 0.68  0.50 - 1.03 mg/dL Final    EGFR 08/03/2022 101  > OR = 60 mL/min/1.73m2 Final    Comment: The eGFR is based on the CKD-EPI 2021 equation. To calculate   the new eGFR from a previous Creatinine or Cystatin C  result, go to https://www.kidney.org/professionals/  kdoqi/gfr%5Fcalculator      BUN/Creatinine Ratio 08/03/2022 NOT APPLICABLE  6 - 22 (calc) Final    Sodium  08/03/2022 140  135 - 146 mmol/L Final    Potassium 08/03/2022 4.2  3.5 - 5.3 mmol/L Final    Chloride 08/03/2022 103  98 - 110 mmol/L Final    CO2 08/03/2022 30  20 - 32 mmol/L Final    Calcium 08/03/2022 8.8  8.6 - 10.4 mg/dL Final       Assessment & Plan:      Sharron was seen today for diabetes.  Farxiga will be ordered for additional diabetes control.  We discussed adding Ozempic to the diabetes therapeutic regimen.  The patient will consider.  She is resistant to injection therapy.  Current medications will be reordered.  The patient has been encouraged to increase oral water intake.    Laboratory studies will be repeated in 4 months.     Diagnoses and all orders for this visit:    Type 2 diabetes mellitus without complication, without long-term current use of insulin  -     amLODIPine (NORVASC) 10 MG tablet; Take 1 tablet (10 mg total) by mouth once daily. For blood pressure.  -     Discontinue: dapagliflozin (FARXIGA) 5 mg Tab tablet; Take 1 tablet (5 mg total) by mouth once daily. For diabetes control.  -     Cancel: Comprehensive Metabolic Panel; Future  -     Cancel: Lipid Panel; Future  -     Cancel: Hemoglobin A1C; Future  -     Hemoglobin A1C; Future  -     Lipid Panel; Future  -     Comprehensive Metabolic Panel; Future  -     Hemoglobin A1C  -     Lipid Panel  -     Comprehensive Metabolic Panel    Essential hypertension  -     chlorthalidone (HYGROTEN) 25 MG Tab; Take 1 tablet (25 mg total) by mouth once daily. For blood pressure.  -     spironolactone (ALDACTONE) 50 MG tablet; Take 1 tablet (50 mg total) by mouth once daily. For blood pressure.  -     Cancel: Comprehensive Metabolic Panel; Future  -     Cancel: Lipid Panel; Future  -     Cancel: Hemoglobin A1C; Future  -     Hemoglobin A1C; Future  -     Lipid Panel; Future  -     Comprehensive Metabolic Panel; Future  -     Hemoglobin A1C  -     Lipid Panel  -     Comprehensive Metabolic Panel    Other hyperlipidemia  -     Cancel: Comprehensive  Metabolic Panel; Future  -     Cancel: Lipid Panel; Future  -     Cancel: Hemoglobin A1C; Future  -     Hemoglobin A1C; Future  -     Lipid Panel; Future  -     Comprehensive Metabolic Panel; Future  -     Hemoglobin A1C  -     Lipid Panel  -     Comprehensive Metabolic Panel    Class 1 obesity due to excess calories with serious comorbidity and body mass index (BMI) of 33.0 to 33.9 in adult    Encounter for screening mammogram for breast cancer  -     Cancel: Mammo Digital Screening Bilat; Future  -     Mammo Digital Screening Bilat w/ Kiran; Future    Other orders  -     atorvastatin (LIPITOR) 80 MG tablet; Take 1 tablet (80 mg total) by mouth every evening. For cholesterol control  -     blood sugar diagnostic Strp; To check BG 1 time daily, to use with insurance preferred meter       Follow up in about 4 months (around 1/6/2023).     Eric Najera MD

## 2022-09-12 ENCOUNTER — HOSPITAL ENCOUNTER (OUTPATIENT)
Dept: RADIOLOGY | Facility: HOSPITAL | Age: 57
Discharge: HOME | End: 2022-09-12
Attending: INTERNAL MEDICINE
Payer: COMMERCIAL

## 2022-09-12 DIAGNOSIS — Z12.31 ENCOUNTER FOR SCREENING MAMMOGRAM FOR BREAST CANCER: ICD-10-CM

## 2022-09-12 PROCEDURE — 77067 MAMMO DIGITAL SCREENING BILAT WITH TOMO: ICD-10-PCS | Mod: 26,,, | Performed by: RADIOLOGY

## 2022-09-12 PROCEDURE — 77067 SCR MAMMO BI INCL CAD: CPT | Mod: TC,PO

## 2022-09-12 PROCEDURE — 77063 MAMMO DIGITAL SCREENING BILAT WITH TOMO: ICD-10-PCS | Mod: 26,,, | Performed by: RADIOLOGY

## 2022-09-12 PROCEDURE — 77063 BREAST TOMOSYNTHESIS BI: CPT | Mod: TC,PO

## 2022-09-12 PROCEDURE — 77063 BREAST TOMOSYNTHESIS BI: CPT | Mod: 26,,, | Performed by: RADIOLOGY

## 2022-09-12 PROCEDURE — 77067 SCR MAMMO BI INCL CAD: CPT | Mod: 26,,, | Performed by: RADIOLOGY

## 2022-09-13 ENCOUNTER — TELEPHONE (OUTPATIENT)
Dept: INTERNAL MEDICINE | Facility: CLINIC | Age: 57
End: 2022-09-13
Payer: COMMERCIAL

## 2022-09-13 NOTE — TELEPHONE ENCOUNTER
Krystina chagn [phone]  faxed us on true metrix test strips.  Stating freestyle strips covered.    The order we sent 9/6 said for any meter covered by her insurance to they can fill freestyle strips.  I left msg to do so.  Please call us if need anything further.

## 2022-10-10 ENCOUNTER — PATIENT MESSAGE (OUTPATIENT)
Dept: OTHER | Facility: OTHER | Age: 57
End: 2022-10-10
Payer: COMMERCIAL

## 2022-10-21 ENCOUNTER — PATIENT OUTREACH (OUTPATIENT)
Dept: ADMINISTRATIVE | Facility: HOSPITAL | Age: 57
End: 2022-10-21
Payer: COMMERCIAL

## 2022-10-21 ENCOUNTER — PATIENT MESSAGE (OUTPATIENT)
Dept: ADMINISTRATIVE | Facility: HOSPITAL | Age: 57
End: 2022-10-21
Payer: COMMERCIAL

## 2022-11-04 ENCOUNTER — PATIENT MESSAGE (OUTPATIENT)
Dept: ADMINISTRATIVE | Facility: OTHER | Age: 57
End: 2022-11-04
Payer: COMMERCIAL

## 2022-11-04 ENCOUNTER — PATIENT MESSAGE (OUTPATIENT)
Dept: OTHER | Facility: OTHER | Age: 57
End: 2022-11-04
Payer: COMMERCIAL

## 2022-11-15 ENCOUNTER — OFFICE VISIT (OUTPATIENT)
Dept: OPTOMETRY | Facility: CLINIC | Age: 57
End: 2022-11-15
Payer: COMMERCIAL

## 2022-11-15 DIAGNOSIS — H25.13 NUCLEAR SCLEROSIS OF BOTH EYES: ICD-10-CM

## 2022-11-15 DIAGNOSIS — E11.9 TYPE 2 DIABETES MELLITUS WITHOUT RETINOPATHY: Primary | ICD-10-CM

## 2022-11-15 PROCEDURE — 3046F PR MOST RECENT HEMOGLOBIN A1C LEVEL > 9.0%: ICD-10-PCS | Mod: CPTII,S$GLB,, | Performed by: OPTOMETRIST

## 2022-11-15 PROCEDURE — 3046F HEMOGLOBIN A1C LEVEL >9.0%: CPT | Mod: CPTII,S$GLB,, | Performed by: OPTOMETRIST

## 2022-11-15 PROCEDURE — 2023F DILAT RTA XM W/O RTNOPTHY: CPT | Mod: CPTII,S$GLB,, | Performed by: OPTOMETRIST

## 2022-11-15 PROCEDURE — 92014 COMPRE OPH EXAM EST PT 1/>: CPT | Mod: S$GLB,,, | Performed by: OPTOMETRIST

## 2022-11-15 PROCEDURE — 3066F PR DOCUMENTATION OF TREATMENT FOR NEPHROPATHY: ICD-10-PCS | Mod: CPTII,S$GLB,, | Performed by: OPTOMETRIST

## 2022-11-15 PROCEDURE — 2023F PR DILATED RETINAL EXAM W/O EVID OF RETINOPATHY: ICD-10-PCS | Mod: CPTII,S$GLB,, | Performed by: OPTOMETRIST

## 2022-11-15 PROCEDURE — 99999 PR PBB SHADOW E&M-EST. PATIENT-LVL I: ICD-10-PCS | Mod: PBBFAC,,, | Performed by: OPTOMETRIST

## 2022-11-15 PROCEDURE — 3061F NEG MICROALBUMINURIA REV: CPT | Mod: CPTII,S$GLB,, | Performed by: OPTOMETRIST

## 2022-11-15 PROCEDURE — 3066F NEPHROPATHY DOC TX: CPT | Mod: CPTII,S$GLB,, | Performed by: OPTOMETRIST

## 2022-11-15 PROCEDURE — 99999 PR PBB SHADOW E&M-EST. PATIENT-LVL I: CPT | Mod: PBBFAC,,, | Performed by: OPTOMETRIST

## 2022-11-15 PROCEDURE — 92014 PR EYE EXAM, EST PATIENT,COMPREHESV: ICD-10-PCS | Mod: S$GLB,,, | Performed by: OPTOMETRIST

## 2022-11-15 PROCEDURE — 3061F PR NEG MICROALBUMINURIA RESULT DOCUMENTED/REVIEW: ICD-10-PCS | Mod: CPTII,S$GLB,, | Performed by: OPTOMETRIST

## 2022-11-15 NOTE — PROGRESS NOTES
Subjective:       Patient ID: Sharron Redmond is a 57 y.o. female      Chief Complaint   Patient presents with    Concerns About Ocular Health    Diabetic Eye Exam     History of Present Illness  Dls: 5/29/20 Dr. Adams    58 y/o female presents today for diabetic eye exam.   Pt states no changes in vision. Pt wears single vision glasses for reading.      this am     No tearing  No itching  No burning  No pain  No ha's  No floaters  No flashes    Eye meds  None    Hemoglobin A1C       Date                     Value               Ref Range             Status                09/01/2022               12.0 (H)            <5.7 % of tota*       Final                       04/20/2022               11.4 (H)            <5.7 % of tota*       Final                   01/13/2022               11.4 (H)            <5.7 % of tota*       Final                 Assessment/Plan:     1. Type 2 diabetes mellitus without retinopathy  No diabetic retinopathy. Discussed with pt the effects of diabetes on vision, importance of good blood sugar control, compliance with meds, and follow up care with PCP. Return in 1 year for dilated eye exam, sooner PRN.    2. Nuclear sclerosis of both eyes  Educated pt on presence of cataracts and effects on vision. No surgery at this time. Recheck in one year, sooner PRN.    Follow up in about 1 year (around 11/15/2023) for Diabetic Eye Exam.

## 2022-11-16 ENCOUNTER — PATIENT MESSAGE (OUTPATIENT)
Dept: ADMINISTRATIVE | Facility: OTHER | Age: 57
End: 2022-11-16
Payer: COMMERCIAL

## 2022-12-20 ENCOUNTER — PATIENT MESSAGE (OUTPATIENT)
Dept: OTHER | Facility: OTHER | Age: 57
End: 2022-12-20
Payer: COMMERCIAL

## 2023-01-11 ENCOUNTER — PATIENT OUTREACH (OUTPATIENT)
Dept: ADMINISTRATIVE | Facility: HOSPITAL | Age: 58
End: 2023-01-11
Payer: COMMERCIAL

## 2023-01-11 NOTE — PROGRESS NOTES
Health Maintenance Due   Topic Date Due    Hepatitis C Screening  Never done    COVID-19 Vaccine (1) Never done    TETANUS VACCINE  Never done    Shingles Vaccine (1 of 2) Never done    Hemoglobin A1c  12/01/2022    Diabetes Urine Screening  01/13/2023    Pneumococcal Vaccines (Age 0-64) (2 - PCV) 01/18/2023   Chart reviewed.   Immunizations: Reconciled  Orders placed: Labs previously place for [facility].  Upcoming appts to satisfy  topics: N/A

## 2023-01-19 LAB
ALBUMIN SERPL-MCNC: 3.7 G/DL (ref 3.6–5.1)
ALBUMIN/GLOB SERPL: 1.3 (CALC) (ref 1–2.5)
ALP SERPL-CCNC: 134 U/L (ref 37–153)
ALT SERPL-CCNC: 14 U/L (ref 6–29)
AST SERPL-CCNC: 14 U/L (ref 10–35)
BILIRUB SERPL-MCNC: 0.4 MG/DL (ref 0.2–1.2)
BUN SERPL-MCNC: 9 MG/DL (ref 7–25)
BUN/CREAT SERPL: ABNORMAL (CALC) (ref 6–22)
CALCIUM SERPL-MCNC: 9 MG/DL (ref 8.6–10.4)
CHLORIDE SERPL-SCNC: 105 MMOL/L (ref 98–110)
CHOLEST SERPL-MCNC: 250 MG/DL
CHOLEST/HDLC SERPL: 3.3 (CALC)
CO2 SERPL-SCNC: 30 MMOL/L (ref 20–32)
CREAT SERPL-MCNC: 0.64 MG/DL (ref 0.5–1.03)
EGFR: 102 ML/MIN/1.73M2
GLOBULIN SER CALC-MCNC: 2.8 G/DL (CALC) (ref 1.9–3.7)
GLUCOSE SERPL-MCNC: 238 MG/DL (ref 65–99)
HBA1C MFR BLD: 12 % OF TOTAL HGB
HDLC SERPL-MCNC: 75 MG/DL
LDLC SERPL CALC-MCNC: 145 MG/DL (CALC)
NONHDLC SERPL-MCNC: 175 MG/DL (CALC)
POTASSIUM SERPL-SCNC: 4.2 MMOL/L (ref 3.5–5.3)
PROT SERPL-MCNC: 6.5 G/DL (ref 6.1–8.1)
SODIUM SERPL-SCNC: 141 MMOL/L (ref 135–146)
TRIGL SERPL-MCNC: 161 MG/DL

## 2023-01-23 ENCOUNTER — OFFICE VISIT (OUTPATIENT)
Dept: INTERNAL MEDICINE | Facility: CLINIC | Age: 58
End: 2023-01-23
Payer: COMMERCIAL

## 2023-01-23 ENCOUNTER — PATIENT MESSAGE (OUTPATIENT)
Dept: INTERNAL MEDICINE | Facility: CLINIC | Age: 58
End: 2023-01-23

## 2023-01-23 VITALS
HEART RATE: 74 BPM | BODY MASS INDEX: 33.1 KG/M2 | SYSTOLIC BLOOD PRESSURE: 130 MMHG | DIASTOLIC BLOOD PRESSURE: 80 MMHG | TEMPERATURE: 98 F | HEIGHT: 62 IN | WEIGHT: 179.88 LBS | OXYGEN SATURATION: 99 % | RESPIRATION RATE: 19 BRPM

## 2023-01-23 DIAGNOSIS — I10 ESSENTIAL HYPERTENSION: Chronic | ICD-10-CM

## 2023-01-23 DIAGNOSIS — E11.9 TYPE 2 DIABETES MELLITUS WITHOUT RETINOPATHY: Primary | ICD-10-CM

## 2023-01-23 DIAGNOSIS — E11.65 TYPE 2 DIABETES MELLITUS WITH HYPERGLYCEMIA, WITHOUT LONG-TERM CURRENT USE OF INSULIN: ICD-10-CM

## 2023-01-23 DIAGNOSIS — E78.49 OTHER HYPERLIPIDEMIA: ICD-10-CM

## 2023-01-23 PROCEDURE — 3008F BODY MASS INDEX DOCD: CPT | Mod: CPTII,S$GLB,, | Performed by: INTERNAL MEDICINE

## 2023-01-23 PROCEDURE — 1160F RVW MEDS BY RX/DR IN RCRD: CPT | Mod: CPTII,S$GLB,, | Performed by: INTERNAL MEDICINE

## 2023-01-23 PROCEDURE — 3046F PR MOST RECENT HEMOGLOBIN A1C LEVEL > 9.0%: ICD-10-PCS | Mod: CPTII,S$GLB,, | Performed by: INTERNAL MEDICINE

## 2023-01-23 PROCEDURE — 99999 PR PBB SHADOW E&M-EST. PATIENT-LVL V: ICD-10-PCS | Mod: PBBFAC,,, | Performed by: INTERNAL MEDICINE

## 2023-01-23 PROCEDURE — 3079F DIAST BP 80-89 MM HG: CPT | Mod: CPTII,S$GLB,, | Performed by: INTERNAL MEDICINE

## 2023-01-23 PROCEDURE — 1159F PR MEDICATION LIST DOCUMENTED IN MEDICAL RECORD: ICD-10-PCS | Mod: CPTII,S$GLB,, | Performed by: INTERNAL MEDICINE

## 2023-01-23 PROCEDURE — 1159F MED LIST DOCD IN RCRD: CPT | Mod: CPTII,S$GLB,, | Performed by: INTERNAL MEDICINE

## 2023-01-23 PROCEDURE — 3046F HEMOGLOBIN A1C LEVEL >9.0%: CPT | Mod: CPTII,S$GLB,, | Performed by: INTERNAL MEDICINE

## 2023-01-23 PROCEDURE — 99214 OFFICE O/P EST MOD 30 MIN: CPT | Mod: S$GLB,,, | Performed by: INTERNAL MEDICINE

## 2023-01-23 PROCEDURE — 1160F PR REVIEW ALL MEDS BY PRESCRIBER/CLIN PHARMACIST DOCUMENTED: ICD-10-PCS | Mod: CPTII,S$GLB,, | Performed by: INTERNAL MEDICINE

## 2023-01-23 PROCEDURE — 3079F PR MOST RECENT DIASTOLIC BLOOD PRESSURE 80-89 MM HG: ICD-10-PCS | Mod: CPTII,S$GLB,, | Performed by: INTERNAL MEDICINE

## 2023-01-23 PROCEDURE — 99214 PR OFFICE/OUTPT VISIT, EST, LEVL IV, 30-39 MIN: ICD-10-PCS | Mod: S$GLB,,, | Performed by: INTERNAL MEDICINE

## 2023-01-23 PROCEDURE — 3075F SYST BP GE 130 - 139MM HG: CPT | Mod: CPTII,S$GLB,, | Performed by: INTERNAL MEDICINE

## 2023-01-23 PROCEDURE — 3008F PR BODY MASS INDEX (BMI) DOCUMENTED: ICD-10-PCS | Mod: CPTII,S$GLB,, | Performed by: INTERNAL MEDICINE

## 2023-01-23 PROCEDURE — 3072F PR LOW RISK FOR RETINOPATHY: ICD-10-PCS | Mod: CPTII,S$GLB,, | Performed by: INTERNAL MEDICINE

## 2023-01-23 PROCEDURE — 3075F PR MOST RECENT SYSTOLIC BLOOD PRESS GE 130-139MM HG: ICD-10-PCS | Mod: CPTII,S$GLB,, | Performed by: INTERNAL MEDICINE

## 2023-01-23 PROCEDURE — 3072F LOW RISK FOR RETINOPATHY: CPT | Mod: CPTII,S$GLB,, | Performed by: INTERNAL MEDICINE

## 2023-01-23 PROCEDURE — 99999 PR PBB SHADOW E&M-EST. PATIENT-LVL V: CPT | Mod: PBBFAC,,, | Performed by: INTERNAL MEDICINE

## 2023-01-23 RX ORDER — ORAL SEMAGLUTIDE 3 MG/1
3 TABLET ORAL DAILY
Qty: 30 TABLET | Refills: 6 | Status: SHIPPED | OUTPATIENT
Start: 2023-01-23

## 2023-01-23 RX ORDER — ASPIRIN 81 MG/1
81 TABLET ORAL
COMMUNITY

## 2023-01-23 RX ORDER — GLIPIZIDE 5 MG/1
5 TABLET ORAL
COMMUNITY
Start: 2022-04-26 | End: 2023-01-23 | Stop reason: ALTCHOICE

## 2023-01-27 ENCOUNTER — PATIENT OUTREACH (OUTPATIENT)
Dept: ADMINISTRATIVE | Facility: HOSPITAL | Age: 58
End: 2023-01-27
Payer: COMMERCIAL

## 2023-01-27 ENCOUNTER — PATIENT MESSAGE (OUTPATIENT)
Dept: ADMINISTRATIVE | Facility: HOSPITAL | Age: 58
End: 2023-01-27
Payer: COMMERCIAL

## 2023-03-13 NOTE — PROGRESS NOTES
Subjective:       Patient ID: Sharron eRdmond is a 57 y.o. female.    Chief Complaint: Follow-up    HPI  The patient presents for follow-up of medical conditions which include type 2 diabetes mellitus, hypertension, hyperlipidemia.  The patient has been participating in the digital medicine program for hypertension and diabetes monitoring.  Blood sugar levels have been high.  She relates she has been more compliant with her diet.  Blood pressure levels have been fairly well controlled.  She denies having any hypoglycemic episodes.      Review of Systems   Constitutional:  Negative for activity change, appetite change and unexpected weight change.   Eyes:  Negative for visual disturbance.   Respiratory:  Negative for shortness of breath.    Cardiovascular:  Negative for chest pain, palpitations and leg swelling.   Gastrointestinal:  Negative for abdominal pain, blood in stool and diarrhea.   Genitourinary:  Negative for dysuria, frequency, hematuria and urgency.   Neurological:  Negative for weakness, numbness and headaches.   Psychiatric/Behavioral:  Negative for sleep disturbance.           Physical Exam  Vitals and nursing note reviewed.   Constitutional:       General: She is not in acute distress.     Appearance: Normal appearance. She is well-developed.   HENT:      Head: Normocephalic and atraumatic.   Eyes:      General: No scleral icterus.     Extraocular Movements: Extraocular movements intact.      Conjunctiva/sclera: Conjunctivae normal.   Neck:      Thyroid: No thyromegaly.      Vascular: No JVD.   Cardiovascular:      Rate and Rhythm: Normal rate and regular rhythm.      Heart sounds: Normal heart sounds. No murmur heard.    No friction rub. No gallop.   Pulmonary:      Effort: Pulmonary effort is normal. No respiratory distress.      Breath sounds: Normal breath sounds. No wheezing or rales.   Abdominal:      General: Bowel sounds are normal.      Palpations: Abdomen is soft. There is no mass.       Tenderness: There is no abdominal tenderness.   Musculoskeletal:         General: No tenderness. Normal range of motion.      Cervical back: Normal range of motion and neck supple.   Lymphadenopathy:      Cervical: No cervical adenopathy.   Skin:     General: Skin is warm and dry.      Findings: No rash.      Comments: No foot lesions are present.   Neurological:      Mental Status: She is alert and oriented to person, place, and time.      Cranial Nerves: No cranial nerve deficit.   Psychiatric:         Mood and Affect: Mood normal.         Behavior: Behavior normal.       Protective Sensation (w/ 10 gram monofilament):  Right: Intact  Left: Intact    Visual Inspection:  Normal -  Bilateral    Pedal Pulses:   Right: Present  Left: Present    Posterior Tibialis Pulses:   Right:Present  Left: Present    No visits with results within 3 Month(s) from this visit.   Latest known visit with results is:   Office Visit on 09/06/2022   Component Date Value Ref Range Status    Hemoglobin A1C 01/18/2023 12.0 (H)  <5.7 % of total Hgb Final    Comment: For someone without known diabetes, a hemoglobin A1c  value of 6.5% or greater indicates that they may have   diabetes and this should be confirmed with a follow-up   test.     For someone with known diabetes, a value <7% indicates   that their diabetes is well controlled and a value   greater than or equal to 7% indicates suboptimal   control. A1c targets should be individualized based on   duration of diabetes, age, comorbid conditions, and   other considerations.     Currently, no consensus exists regarding use of  hemoglobin A1c for diagnosis of diabetes for children.          Cholesterol 01/18/2023 250 (H)  <200 mg/dL Final    HDL 01/18/2023 75  > OR = 50 mg/dL Final    Triglycerides 01/18/2023 161 (H)  <150 mg/dL Final    LDL Cholesterol 01/18/2023 145 (H)  mg/dL (calc) Final    Comment: Reference range: <100     Desirable range <100 mg/dL for primary prevention;    <70  mg/dL for patients with CHD or diabetic patients   with > or = 2 CHD risk factors.     LDL-C is now calculated using the Raza   calculation, which is a validated novel method providing   better accuracy than the Friedewald equation in the   estimation of LDL-C.   Brandon CARREON et al. THANH. 2013;310(19): 0894-8313   (http://education.[employer]/faq/QGE935)      HDL/Cholesterol Ratio 01/18/2023 3.3  <5.0 (calc) Final    Non HDL Chol. (LDL+VLDL) 01/18/2023 175 (H)  <130 mg/dL (calc) Final    Comment: For patients with diabetes plus 1 major ASCVD risk   factor, treating to a non-HDL-C goal of <100 mg/dL   (LDL-C of <70 mg/dL) is considered a therapeutic   option.      Glucose 01/18/2023 238 (H)  65 - 99 mg/dL Final    Comment:               Fasting reference interval     For someone without known diabetes, a glucose  value >125 mg/dL indicates that they may have  diabetes and this should be confirmed with a  follow-up test.         BUN 01/18/2023 9  7 - 25 mg/dL Final    Creatinine 01/18/2023 0.64  0.50 - 1.03 mg/dL Final    eGFR 01/18/2023 102  > OR = 60 mL/min/1.73m2 Final    Comment: The eGFR is based on the CKD-EPI 2021 equation. To calculate   the new eGFR from a previous Creatinine or Cystatin C  result, go to https://www.kidney.org/professionals/  kdoqi/gfr%5Fcalculator      BUN/Creatinine Ratio 01/18/2023 NOT APPLICABLE  6 - 22 (calc) Final    Sodium 01/18/2023 141  135 - 146 mmol/L Final    Potassium 01/18/2023 4.2  3.5 - 5.3 mmol/L Final    Chloride 01/18/2023 105  98 - 110 mmol/L Final    CO2 01/18/2023 30  20 - 32 mmol/L Final    Calcium 01/18/2023 9.0  8.6 - 10.4 mg/dL Final    Total Protein 01/18/2023 6.5  6.1 - 8.1 g/dL Final    Albumin 01/18/2023 3.7  3.6 - 5.1 g/dL Final    Globulin, Total 01/18/2023 2.8  1.9 - 3.7 g/dL (calc) Final    Albumin/Globulin Ratio 01/18/2023 1.3  1.0 - 2.5 (calc) Final    Total Bilirubin 01/18/2023 0.4  0.2 - 1.2 mg/dL Final    Alkaline Phosphatase 01/18/2023  134  37 - 153 U/L Final    AST 01/18/2023 14  10 - 35 U/L Final    ALT 01/18/2023 14  6 - 29 U/L Final       Assessment & Plan:      Sharron was seen today for follow-up.  The dose of glipizide will be increased to 5 mg twice daily.  Rybelsus 3 mg daily will also be ordered for better diabetes control.  Lab studies will be repeated in 4 months.    Diagnoses and all orders for this visit:    Type 2 diabetes mellitus without retinopathy  -     Comprehensive Metabolic Panel; Future  -     Hemoglobin A1C; Future  -     Comprehensive Metabolic Panel  -     Hemoglobin A1C    Essential hypertension  -     Comprehensive Metabolic Panel; Future  -     Hemoglobin A1C; Future  -     Comprehensive Metabolic Panel  -     Hemoglobin A1C    Other hyperlipidemia    Type 2 diabetes mellitus with hyperglycemia, without long-term current use of insulin  -     semaglutide (RYBELSUS) 3 mg tablet; Take 1 tablet (3 mg total) by mouth once daily.         Follow up in about 4 months (around 5/23/2023).     Eric Najera MD

## 2023-05-18 ENCOUNTER — PATIENT OUTREACH (OUTPATIENT)
Dept: ADMINISTRATIVE | Facility: HOSPITAL | Age: 58
End: 2023-05-18
Payer: COMMERCIAL

## 2023-05-18 NOTE — PROGRESS NOTES
Health Maintenance Due   Topic Date Due    Hepatitis C Screening  Never done    COVID-19 Vaccine (1) Never done    TETANUS VACCINE  Never done    Shingles Vaccine (1 of 2) Never done    Diabetes Urine Screening  01/13/2023    Pneumococcal Vaccines (Age 0-64) (2 - PCV) 01/18/2023    Hemoglobin A1c  04/18/2023     Chart reviewed.   Immunizations: Reconciled  Orders placed: N/A  Upcoming appts to satisfy [facility] topics: N/A

## 2023-05-27 LAB
ALBUMIN SERPL-MCNC: 4.2 G/DL (ref 3.6–5.1)
ALBUMIN/GLOB SERPL: 1.4 (CALC) (ref 1–2.5)
ALP SERPL-CCNC: 148 U/L (ref 37–153)
ALT SERPL-CCNC: 12 U/L (ref 6–29)
AST SERPL-CCNC: 12 U/L (ref 10–35)
BILIRUB SERPL-MCNC: 0.4 MG/DL (ref 0.2–1.2)
BUN SERPL-MCNC: 9 MG/DL (ref 7–25)
BUN/CREAT SERPL: ABNORMAL (CALC) (ref 6–22)
CALCIUM SERPL-MCNC: 9.7 MG/DL (ref 8.6–10.4)
CHLORIDE SERPL-SCNC: 99 MMOL/L (ref 98–110)
CO2 SERPL-SCNC: 30 MMOL/L (ref 20–32)
CREAT SERPL-MCNC: 0.73 MG/DL (ref 0.5–1.03)
EGFR: 95 ML/MIN/1.73M2
GLOBULIN SER CALC-MCNC: 3 G/DL (CALC) (ref 1.9–3.7)
GLUCOSE SERPL-MCNC: 230 MG/DL (ref 65–99)
HBA1C MFR BLD: 10.9 % OF TOTAL HGB
POTASSIUM SERPL-SCNC: 4.3 MMOL/L (ref 3.5–5.3)
PROT SERPL-MCNC: 7.2 G/DL (ref 6.1–8.1)
SODIUM SERPL-SCNC: 138 MMOL/L (ref 135–146)

## 2023-05-30 ENCOUNTER — OFFICE VISIT (OUTPATIENT)
Dept: INTERNAL MEDICINE | Facility: CLINIC | Age: 58
End: 2023-05-30
Payer: COMMERCIAL

## 2023-05-30 VITALS
HEART RATE: 64 BPM | WEIGHT: 178 LBS | RESPIRATION RATE: 16 BRPM | DIASTOLIC BLOOD PRESSURE: 70 MMHG | SYSTOLIC BLOOD PRESSURE: 142 MMHG | HEIGHT: 62 IN | TEMPERATURE: 98 F | BODY MASS INDEX: 32.76 KG/M2

## 2023-05-30 DIAGNOSIS — E11.9 TYPE 2 DIABETES MELLITUS WITHOUT COMPLICATION, WITHOUT LONG-TERM CURRENT USE OF INSULIN: Primary | Chronic | ICD-10-CM

## 2023-05-30 DIAGNOSIS — I10 ESSENTIAL HYPERTENSION: Chronic | ICD-10-CM

## 2023-05-30 DIAGNOSIS — E78.49 OTHER HYPERLIPIDEMIA: ICD-10-CM

## 2023-05-30 DIAGNOSIS — D68.59 HYPERCOAGULABLE STATE: ICD-10-CM

## 2023-05-30 PROCEDURE — 3078F DIAST BP <80 MM HG: CPT | Mod: CPTII,S$GLB,, | Performed by: INTERNAL MEDICINE

## 2023-05-30 PROCEDURE — 3008F BODY MASS INDEX DOCD: CPT | Mod: CPTII,S$GLB,, | Performed by: INTERNAL MEDICINE

## 2023-05-30 PROCEDURE — 3072F LOW RISK FOR RETINOPATHY: CPT | Mod: CPTII,S$GLB,, | Performed by: INTERNAL MEDICINE

## 2023-05-30 PROCEDURE — 99999 PR PBB SHADOW E&M-EST. PATIENT-LVL V: CPT | Mod: PBBFAC,,, | Performed by: INTERNAL MEDICINE

## 2023-05-30 PROCEDURE — 1159F MED LIST DOCD IN RCRD: CPT | Mod: CPTII,S$GLB,, | Performed by: INTERNAL MEDICINE

## 2023-05-30 PROCEDURE — 3077F PR MOST RECENT SYSTOLIC BLOOD PRESSURE >= 140 MM HG: ICD-10-PCS | Mod: CPTII,S$GLB,, | Performed by: INTERNAL MEDICINE

## 2023-05-30 PROCEDURE — 99999 PR PBB SHADOW E&M-EST. PATIENT-LVL V: ICD-10-PCS | Mod: PBBFAC,,, | Performed by: INTERNAL MEDICINE

## 2023-05-30 PROCEDURE — 1159F PR MEDICATION LIST DOCUMENTED IN MEDICAL RECORD: ICD-10-PCS | Mod: CPTII,S$GLB,, | Performed by: INTERNAL MEDICINE

## 2023-05-30 PROCEDURE — 3046F PR MOST RECENT HEMOGLOBIN A1C LEVEL > 9.0%: ICD-10-PCS | Mod: CPTII,S$GLB,, | Performed by: INTERNAL MEDICINE

## 2023-05-30 PROCEDURE — 99214 PR OFFICE/OUTPT VISIT, EST, LEVL IV, 30-39 MIN: ICD-10-PCS | Mod: S$GLB,,, | Performed by: INTERNAL MEDICINE

## 2023-05-30 PROCEDURE — 3008F PR BODY MASS INDEX (BMI) DOCUMENTED: ICD-10-PCS | Mod: CPTII,S$GLB,, | Performed by: INTERNAL MEDICINE

## 2023-05-30 PROCEDURE — 1160F PR REVIEW ALL MEDS BY PRESCRIBER/CLIN PHARMACIST DOCUMENTED: ICD-10-PCS | Mod: CPTII,S$GLB,, | Performed by: INTERNAL MEDICINE

## 2023-05-30 PROCEDURE — 1160F RVW MEDS BY RX/DR IN RCRD: CPT | Mod: CPTII,S$GLB,, | Performed by: INTERNAL MEDICINE

## 2023-05-30 PROCEDURE — 3078F PR MOST RECENT DIASTOLIC BLOOD PRESSURE < 80 MM HG: ICD-10-PCS | Mod: CPTII,S$GLB,, | Performed by: INTERNAL MEDICINE

## 2023-05-30 PROCEDURE — 3072F PR LOW RISK FOR RETINOPATHY: ICD-10-PCS | Mod: CPTII,S$GLB,, | Performed by: INTERNAL MEDICINE

## 2023-05-30 PROCEDURE — 99214 OFFICE O/P EST MOD 30 MIN: CPT | Mod: S$GLB,,, | Performed by: INTERNAL MEDICINE

## 2023-05-30 PROCEDURE — 3046F HEMOGLOBIN A1C LEVEL >9.0%: CPT | Mod: CPTII,S$GLB,, | Performed by: INTERNAL MEDICINE

## 2023-05-30 PROCEDURE — 3077F SYST BP >= 140 MM HG: CPT | Mod: CPTII,S$GLB,, | Performed by: INTERNAL MEDICINE

## 2023-05-30 RX ORDER — GLIPIZIDE 10 MG/1
10 TABLET ORAL 2 TIMES DAILY WITH MEALS
Qty: 60 TABLET | Refills: 11 | Status: SHIPPED | OUTPATIENT
Start: 2023-05-30

## 2023-07-03 NOTE — PROGRESS NOTES
Subjective:       Patient ID: Sharron Redmond is a 57 y.o. female.    Chief Complaint: Diabetes (4 mos w/labs.  See cmp, aic results. )    HPI  The patient presents for follow-up of medical conditions which include type 2 diabetes mellitus, hypertension hyperlipidemia, hypercoagulable state following COVID-19 infection.  The patient also has been treated for iron-deficiency anemia.  She has received iron infusions under the direction of Hematology-Oncology at [facility] for her iron-deficiency.  She was advised to have an EGD and colonoscopy for evaluation.  She has been off of Eliquis since January 2022.    She is status post COVID infection on 09/20/2021.  She experienced bilateral pulmonary emboli on 10/06/2021 and thoracic aorta thrombus on 10/06/2021.  Left femoral DVT was diagnosed on 10/08/2020.  The patient is followed her cardiologist Dr. Rell Reinoso.  The patient reports her blood pressures have ranged from 120-140 systolic.    Review of Systems   Constitutional:  Negative for activity change, appetite change and unexpected weight change.   Eyes:  Negative for visual disturbance.   Respiratory:  Negative for shortness of breath.    Cardiovascular:  Negative for chest pain, palpitations and leg swelling.   Gastrointestinal:  Positive for nausea. Negative for abdominal pain, blood in stool and diarrhea.   Genitourinary:  Negative for dysuria, frequency, hematuria and urgency.   Musculoskeletal:  Positive for arthralgias (mild arthritis pain in both knees).   Neurological:  Negative for weakness, numbness and headaches.   Psychiatric/Behavioral:  Negative for sleep disturbance.           Physical Exam  Vitals and nursing note reviewed.   Constitutional:       General: She is not in acute distress.     Appearance: Normal appearance. She is well-developed.   HENT:      Head: Normocephalic and atraumatic.   Eyes:      General: No scleral icterus.     Extraocular Movements: Extraocular  movements intact.      Conjunctiva/sclera: Conjunctivae normal.   Neck:      Thyroid: No thyromegaly.      Vascular: No JVD.   Cardiovascular:      Rate and Rhythm: Normal rate and regular rhythm.      Heart sounds: Normal heart sounds. No murmur heard.    No friction rub. No gallop.   Pulmonary:      Effort: Pulmonary effort is normal. No respiratory distress.      Breath sounds: Normal breath sounds. No wheezing or rales.   Abdominal:      General: Bowel sounds are normal.      Palpations: Abdomen is soft. There is no mass.      Tenderness: There is no abdominal tenderness.   Musculoskeletal:         General: No tenderness. Normal range of motion.      Cervical back: Normal range of motion and neck supple.   Lymphadenopathy:      Cervical: No cervical adenopathy.   Skin:     General: Skin is warm and dry.      Findings: No rash.   Neurological:      Mental Status: She is alert and oriented to person, place, and time.      Cranial Nerves: No cranial nerve deficit.   Psychiatric:         Mood and Affect: Mood normal.         Behavior: Behavior normal.       Protective Sensation (w/ 10 gram monofilament):  Right: Intact  Left: Intact    Visual Inspection:  Normal -  Bilateral    Pedal Pulses:   Right: Present  Left: Present    Posterior Tibialis Pulses:   Right:Present  Left: Present    Component Date Value Ref Range Status    Glucose 05/26/2023 230 (H)  65 - 99 mg/dL Final    Comment:               Fasting reference interval     For someone without known diabetes, a glucose  value >125 mg/dL indicates that they may have  diabetes and this should be confirmed with a  follow-up test.         BUN 05/26/2023 9  7 - 25 mg/dL Final    Creatinine 05/26/2023 0.73  0.50 - 1.03 mg/dL Final    eGFR 05/26/2023 95  > OR = 60 mL/min/1.73m2 Final    Comment: The eGFR is based on the CKD-EPI 2021 equation. To calculate   the new eGFR from a previous Creatinine or Cystatin C  result, go to  https://www.kidney.org/professionals/  kdoqi/gfr%5Fcalculator      BUN/Creatinine Ratio 05/26/2023 NOT APPLICABLE  6 - 22 (calc) Final    Sodium 05/26/2023 138  135 - 146 mmol/L Final    Potassium 05/26/2023 4.3  3.5 - 5.3 mmol/L Final    Chloride 05/26/2023 99  98 - 110 mmol/L Final    CO2 05/26/2023 30  20 - 32 mmol/L Final    Calcium 05/26/2023 9.7  8.6 - 10.4 mg/dL Final    Total Protein 05/26/2023 7.2  6.1 - 8.1 g/dL Final    Albumin 05/26/2023 4.2  3.6 - 5.1 g/dL Final    Globulin, Total 05/26/2023 3.0  1.9 - 3.7 g/dL (calc) Final    Albumin/Globulin Ratio 05/26/2023 1.4  1.0 - 2.5 (calc) Final    Total Bilirubin 05/26/2023 0.4  0.2 - 1.2 mg/dL Final    Alkaline Phosphatase 05/26/2023 148  37 - 153 U/L Final    AST 05/26/2023 12  10 - 35 U/L Final    ALT 05/26/2023 12  6 - 29 U/L Final    Hemoglobin A1C 05/26/2023 10.9 (H)  <5.7 % of total Hgb Final    Comment: For someone without known diabetes, a hemoglobin A1c  value of 6.5% or greater indicates that they may have   diabetes and this should be confirmed with a follow-up   test.     For someone with known diabetes, a value <7% indicates   that their diabetes is well controlled and a value   greater than or equal to 7% indicates suboptimal   control. A1c targets should be individualized based on   duration of diabetes, age, comorbid conditions, and   other considerations.     Currently, no consensus exists regarding use of  hemoglobin A1c for diagnosis of diabetes for children.             Assessment & Plan:      Sharron was seen today for diabetes.  The dose of glipizide will be increased to 10 mg twice daily.  The patient should continue metformin, Farxiga and Rybelsus.    The patient should continue self blood pressure monitoring.  Current antihypertensive medication will be continued for now.  Medication compliance was discussed.      Fasting blood tests will be repeated in 3 months.    Diagnoses and all orders for this visit:    Type 2 diabetes mellitus  without complication, without long-term current use of insulin  -     glipiZIDE (GLUCOTROL) 10 MG tablet; Take 1 tablet (10 mg total) by mouth 2 (two) times daily with meals.  -     Cancel: Comprehensive Metabolic Panel; Future  -     Cancel: Lipid Panel; Future  -     Cancel: Hemoglobin A1C; Future  -     Hemoglobin A1C; Future  -     Lipid Panel; Future  -     Comprehensive Metabolic Panel; Future  -     Hemoglobin A1C  -     Lipid Panel  -     Comprehensive Metabolic Panel    Essential hypertension  -     Cancel: Comprehensive Metabolic Panel; Future  -     Cancel: Lipid Panel; Future  -     Cancel: Hemoglobin A1C; Future  -     Hemoglobin A1C; Future  -     Lipid Panel; Future  -     Comprehensive Metabolic Panel; Future  -     Hemoglobin A1C  -     Lipid Panel  -     Comprehensive Metabolic Panel    Other hyperlipidemia  -     Cancel: Comprehensive Metabolic Panel; Future  -     Cancel: Lipid Panel; Future  -     Cancel: Hemoglobin A1C; Future  -     Hemoglobin A1C; Future  -     Lipid Panel; Future  -     Comprehensive Metabolic Panel; Future  -     Hemoglobin A1C  -     Lipid Panel  -     Comprehensive Metabolic Panel    Hypercoagulable state         Follow up in about 3 months (around 8/30/2023).     Eric Najera MD

## 2023-07-12 LAB — CRC RECOMMENDATION EXT: NORMAL

## 2023-07-18 DIAGNOSIS — I10 ESSENTIAL HYPERTENSION: Chronic | ICD-10-CM

## 2023-07-18 DIAGNOSIS — E11.9 TYPE 2 DIABETES MELLITUS WITHOUT COMPLICATION, WITHOUT LONG-TERM CURRENT USE OF INSULIN: Chronic | ICD-10-CM

## 2023-07-18 RX ORDER — DAPAGLIFLOZIN 10 MG/1
TABLET, FILM COATED ORAL
Qty: 90 TABLET | Refills: 1 | Status: SHIPPED | OUTPATIENT
Start: 2023-07-18

## 2023-07-18 NOTE — TELEPHONE ENCOUNTER
No care due was identified.  [facility] [place] Embedded Care Due Messages. Reference number: [id number].   7/18/2023 5:49:40 AM CDT

## 2023-07-18 NOTE — TELEPHONE ENCOUNTER
Refill Routing Note   Medication(s) are not appropriate for processing by [facility] for the following reason(s):      Required vitals abnormal    [facility] action(s):  Approve  Defer Care Due:  None identified     Medication Therapy Plan: BP 5/30/23 (!) 142/70      Appointments  past 12m or future 3m with PCP    Date Provider   Last Visit   5/30/2023 Eric Najera MD   Next Visit   10/3/2023 Eric Najera MD   ED visits in past 90 days: 0        Note composed:11:32 AM 07/18/2023

## 2023-07-19 RX ORDER — AMLODIPINE BESYLATE 10 MG/1
TABLET ORAL
Qty: 90 TABLET | Refills: 3 | Status: SHIPPED | OUTPATIENT
Start: 2023-07-19

## 2023-07-19 RX ORDER — SPIRONOLACTONE 50 MG/1
TABLET, FILM COATED ORAL
Qty: 90 TABLET | Refills: 3 | Status: SHIPPED | OUTPATIENT
Start: 2023-07-19

## 2023-07-19 RX ORDER — CHLORTHALIDONE 25 MG/1
TABLET ORAL
Qty: 90 TABLET | Refills: 3 | Status: SHIPPED | OUTPATIENT
Start: 2023-07-19

## 2023-07-21 ENCOUNTER — PATIENT OUTREACH (OUTPATIENT)
Dept: ADMINISTRATIVE | Facility: HOSPITAL | Age: 58
End: 2023-07-21
Payer: COMMERCIAL

## 2023-09-13 ENCOUNTER — TELEPHONE (OUTPATIENT)
Dept: INTERNAL MEDICINE | Facility: CLINIC | Age: 58
End: 2023-09-13
Payer: COMMERCIAL

## 2023-09-13 NOTE — TELEPHONE ENCOUNTER
I told her dr shepherd'd handicap license, he has to sign tomorrow when he  Returns to clinic.  She said ok to mail.    Will mail tomorrow 9/14/23

## 2023-09-13 NOTE — TELEPHONE ENCOUNTER
----- Message from Darryl Brooks sent at 9/13/2023  2:07 PM CDT -----  Contact: [phone] @ patient  Good afternoon patient would like a call back to see if she can get a handicap tag for her car. Please give patient a call back [phone]

## 2023-09-13 NOTE — TELEPHONE ENCOUNTER
She is struggling to use steps to get to her apartment/home with her sciatica..  Her spot she has been using is now going to be a handicap spot.    She struggles some days more than others.    Hoping you can ok a [email or web address] license.    Ok?

## 2023-09-14 ENCOUNTER — TELEPHONE (OUTPATIENT)
Dept: INTERNAL MEDICINE | Facility: CLINIC | Age: 58
End: 2023-09-14
Payer: COMMERCIAL

## 2023-09-14 NOTE — TELEPHONE ENCOUNTER
----- Message from Natasha Amor sent at 9/14/2023  9:26 AM CDT -----  Contact: [phone]  Pt called to advise that she changed her mind and she will  the paper work tomorrow from the office and it does not need to be mailed. Please Advise

## 2023-09-19 ENCOUNTER — PATIENT OUTREACH (OUTPATIENT)
Dept: ADMINISTRATIVE | Facility: HOSPITAL | Age: 58
End: 2023-09-19
Payer: COMMERCIAL

## 2023-09-19 NOTE — PROGRESS NOTES
Health Maintenance Due   Topic Date Due    Hepatitis C Screening  Never done    COVID-19 Vaccine (1) Never done    TETANUS VACCINE  Never done    Shingles Vaccine (1 of 2) Never done    Diabetes Urine Screening  01/13/2023    Pneumococcal Vaccines (Age 0-64) (2 - PCV) 01/18/2023    Hemoglobin A1c  08/26/2023    Influenza Vaccine (1) 09/01/2023    Mammogram  09/12/2023    Eye Exam  11/15/2023     Chart reviewed.   Immunizations: Reconciled  Orders placed: N/A  Upcoming appts to satisfy [facility] topics: N/A

## 2023-09-28 LAB
ALBUMIN SERPL-MCNC: 3.8 G/DL (ref 3.6–5.1)
ALBUMIN/GLOB SERPL: 1.3 (CALC) (ref 1–2.5)
ALP SERPL-CCNC: 127 U/L (ref 37–153)
ALT SERPL-CCNC: 13 U/L (ref 6–29)
AST SERPL-CCNC: 13 U/L (ref 10–35)
BILIRUB SERPL-MCNC: 0.5 MG/DL (ref 0.2–1.2)
BUN SERPL-MCNC: 10 MG/DL (ref 7–25)
BUN/CREAT SERPL: ABNORMAL (CALC) (ref 6–22)
CALCIUM SERPL-MCNC: 9.5 MG/DL (ref 8.6–10.4)
CHLORIDE SERPL-SCNC: 101 MMOL/L (ref 98–110)
CHOLEST SERPL-MCNC: 278 MG/DL
CHOLEST/HDLC SERPL: 3.9 (CALC)
CO2 SERPL-SCNC: 24 MMOL/L (ref 20–32)
CREAT SERPL-MCNC: 0.76 MG/DL (ref 0.5–1.03)
EGFR: 90 ML/MIN/1.73M2
GLOBULIN SER CALC-MCNC: 3 G/DL (CALC) (ref 1.9–3.7)
GLUCOSE SERPL-MCNC: 193 MG/DL (ref 65–99)
HBA1C MFR BLD: 11 % OF TOTAL HGB
HDLC SERPL-MCNC: 71 MG/DL
LDLC SERPL CALC-MCNC: 176 MG/DL (CALC)
NONHDLC SERPL-MCNC: 207 MG/DL (CALC)
POTASSIUM SERPL-SCNC: 4 MMOL/L (ref 3.5–5.3)
PROT SERPL-MCNC: 6.8 G/DL (ref 6.1–8.1)
SODIUM SERPL-SCNC: 138 MMOL/L (ref 135–146)
TRIGL SERPL-MCNC: 159 MG/DL

## 2023-10-03 ENCOUNTER — OFFICE VISIT (OUTPATIENT)
Dept: INTERNAL MEDICINE | Facility: CLINIC | Age: 58
End: 2023-10-03
Payer: COMMERCIAL

## 2023-10-03 VITALS
HEART RATE: 84 BPM | WEIGHT: 180.56 LBS | BODY MASS INDEX: 33.02 KG/M2 | SYSTOLIC BLOOD PRESSURE: 160 MMHG | OXYGEN SATURATION: 98 % | RESPIRATION RATE: 12 BRPM | TEMPERATURE: 98 F | DIASTOLIC BLOOD PRESSURE: 82 MMHG

## 2023-10-03 DIAGNOSIS — Z12.31 ENCOUNTER FOR SCREENING MAMMOGRAM FOR BREAST CANCER: ICD-10-CM

## 2023-10-03 DIAGNOSIS — I10 ESSENTIAL HYPERTENSION: ICD-10-CM

## 2023-10-03 DIAGNOSIS — D68.59 HYPERCOAGULABLE STATE: ICD-10-CM

## 2023-10-03 DIAGNOSIS — E78.49 OTHER HYPERLIPIDEMIA: ICD-10-CM

## 2023-10-03 DIAGNOSIS — E11.9 TYPE 2 DIABETES MELLITUS WITHOUT COMPLICATION, WITHOUT LONG-TERM CURRENT USE OF INSULIN: Primary | ICD-10-CM

## 2023-10-03 PROCEDURE — 99214 OFFICE O/P EST MOD 30 MIN: CPT | Mod: 25,S$GLB,, | Performed by: INTERNAL MEDICINE

## 2023-10-03 PROCEDURE — 3077F PR MOST RECENT SYSTOLIC BLOOD PRESSURE >= 140 MM HG: ICD-10-PCS | Mod: CPTII,S$GLB,, | Performed by: INTERNAL MEDICINE

## 2023-10-03 PROCEDURE — 99214 PR OFFICE/OUTPT VISIT, EST, LEVL IV, 30-39 MIN: ICD-10-PCS | Mod: 25,S$GLB,, | Performed by: INTERNAL MEDICINE

## 2023-10-03 PROCEDURE — 3077F SYST BP >= 140 MM HG: CPT | Mod: CPTII,S$GLB,, | Performed by: INTERNAL MEDICINE

## 2023-10-03 PROCEDURE — 90677 PNEUMOCOCCAL CONJUGATE VACCINE 20-VALENT: ICD-10-PCS | Mod: S$GLB,,, | Performed by: INTERNAL MEDICINE

## 2023-10-03 PROCEDURE — 90471 IMMUNIZATION ADMIN: CPT | Mod: S$GLB,,, | Performed by: INTERNAL MEDICINE

## 2023-10-03 PROCEDURE — 99999 PR PBB SHADOW E&M-EST. PATIENT-LVL V: CPT | Mod: PBBFAC,,, | Performed by: INTERNAL MEDICINE

## 2023-10-03 PROCEDURE — 3008F PR BODY MASS INDEX (BMI) DOCUMENTED: ICD-10-PCS | Mod: CPTII,S$GLB,, | Performed by: INTERNAL MEDICINE

## 2023-10-03 PROCEDURE — 99999 PR PBB SHADOW E&M-EST. PATIENT-LVL V: ICD-10-PCS | Mod: PBBFAC,,, | Performed by: INTERNAL MEDICINE

## 2023-10-03 PROCEDURE — 90471 PNEUMOCOCCAL CONJUGATE VACCINE 20-VALENT: ICD-10-PCS | Mod: S$GLB,,, | Performed by: INTERNAL MEDICINE

## 2023-10-03 PROCEDURE — 3079F DIAST BP 80-89 MM HG: CPT | Mod: CPTII,S$GLB,, | Performed by: INTERNAL MEDICINE

## 2023-10-03 PROCEDURE — 90677 PCV20 VACCINE IM: CPT | Mod: S$GLB,,, | Performed by: INTERNAL MEDICINE

## 2023-10-03 PROCEDURE — 3046F HEMOGLOBIN A1C LEVEL >9.0%: CPT | Mod: CPTII,S$GLB,, | Performed by: INTERNAL MEDICINE

## 2023-10-03 PROCEDURE — 3046F PR MOST RECENT HEMOGLOBIN A1C LEVEL > 9.0%: ICD-10-PCS | Mod: CPTII,S$GLB,, | Performed by: INTERNAL MEDICINE

## 2023-10-03 PROCEDURE — 3008F BODY MASS INDEX DOCD: CPT | Mod: CPTII,S$GLB,, | Performed by: INTERNAL MEDICINE

## 2023-10-03 PROCEDURE — 3079F PR MOST RECENT DIASTOLIC BLOOD PRESSURE 80-89 MM HG: ICD-10-PCS | Mod: CPTII,S$GLB,, | Performed by: INTERNAL MEDICINE

## 2023-10-03 NOTE — PROGRESS NOTES
Subjective:       Patient ID: Sharron Redmond is a 58 y.o. female.    Chief Complaint: Follow-up and Diabetes    HPI  The patient presents for follow-up of medical conditions which include type 2 diabetes mellitus, hypertension, hyperlipidemia.  The patient reports blood sugar levels have been quite variable.  She has had readings in the 200+ range.  No hypoglycemia has been noted.  She admits to often missing her metformin tablets as prescribed.  She does not monitor blood pressures but she is tolerating her medication well without side effects.  Her diet reportedly has remained unchanged.  She does not exercise although she remains active.    Review of Systems   Constitutional:  Negative for activity change, appetite change and unexpected weight change.   Eyes:  Negative for visual disturbance.   Respiratory:  Negative for shortness of breath.    Cardiovascular:  Negative for chest pain, palpitations and leg swelling.   Gastrointestinal:  Negative for abdominal pain, blood in stool and diarrhea.   Genitourinary:  Negative for dysuria, frequency, hematuria and urgency.   Neurological:  Negative for weakness, numbness and headaches.   Psychiatric/Behavioral:  Negative for sleep disturbance.             Physical Exam  Vitals and nursing note reviewed.   Constitutional:       General: She is not in acute distress.     Appearance: Normal appearance. She is well-developed.   HENT:      Head: Normocephalic and atraumatic.   Eyes:      General: No scleral icterus.     Extraocular Movements: Extraocular movements intact.      Conjunctiva/sclera: Conjunctivae normal.   Neck:      Thyroid: No thyromegaly.      Vascular: No JVD.   Cardiovascular:      Rate and Rhythm: Normal rate and regular rhythm.      Heart sounds: Normal heart sounds. No murmur heard.     No friction rub. No gallop.   Pulmonary:      Effort: Pulmonary effort is normal. No respiratory distress.      Breath sounds: Normal breath sounds. No wheezing or  rales.   Abdominal:      General: Bowel sounds are normal.      Palpations: Abdomen is soft. There is no mass.      Tenderness: There is no abdominal tenderness.   Musculoskeletal:         General: No tenderness. Normal range of motion.      Cervical back: Normal range of motion and neck supple.   Lymphadenopathy:      Cervical: No cervical adenopathy.   Skin:     General: Skin is warm and dry.      Findings: No rash.      Comments: No foot lesions are present.   Neurological:      Mental Status: She is alert and oriented to person, place, and time.      Cranial Nerves: No cranial nerve deficit.      Comments: Sensory exam is intact in both feet on monofilament testing.   Psychiatric:         Mood and Affect: Mood normal.         Behavior: Behavior normal.         Protective Sensation (w/ 10 gram monofilament):  Right: Intact  Left: Intact    Visual Inspection:  Normal -  Bilateral    Pedal Pulses:   Right: Present  Left: Present    Posterior Tibialis Pulses:   Right:Present  Left: Present        Assessment & Plan:      Sharron was seen today for follow-up and diabetes.  Screening mammogram will be obtained.  The patient reports she will get her flu vaccine at work.  Prevnar-20 vaccine will be administered today.    The patient was advised to take all 3 of her extended-release metformin habits daily at once.  This should improve medication compliance.  Laboratory studies will be obtained in 4 months.    Diagnoses and all orders for this visit:    Type 2 diabetes mellitus without complication, without long-term current use of insulin  -     Cancel: Comprehensive Metabolic Panel; Future  -     Cancel: Lipid Panel; Future  -     Cancel: Hemoglobin A1C; Future  -     Hemoglobin A1C; Future  -     Lipid Panel; Future  -     Comprehensive Metabolic Panel; Future  -     Hemoglobin A1C  -     Lipid Panel  -     Comprehensive Metabolic Panel    Essential hypertension  -     Cancel: Comprehensive Metabolic Panel; Future  -      Cancel: Lipid Panel; Future  -     Cancel: Hemoglobin A1C; Future  -     Hemoglobin A1C; Future  -     Lipid Panel; Future  -     Comprehensive Metabolic Panel; Future  -     Hemoglobin A1C  -     Lipid Panel  -     Comprehensive Metabolic Panel    Other hyperlipidemia  -     Cancel: Comprehensive Metabolic Panel; Future  -     Cancel: Lipid Panel; Future  -     Cancel: Hemoglobin A1C; Future  -     Hemoglobin A1C; Future  -     Lipid Panel; Future  -     Comprehensive Metabolic Panel; Future  -     Hemoglobin A1C  -     Lipid Panel  -     Comprehensive Metabolic Panel    Hypercoagulable state    Encounter for screening mammogram for breast cancer  -     Mammo Digital Screening Bilat w/ Kiran; Future    Other orders  -     (In Office Administered) Pneumococcal Conjugate Vaccine (20 Valent) (IM) (Preferred)         Follow up in about 4 months (around 2/3/2024).     Eric Najera MD

## 2023-10-05 ENCOUNTER — PATIENT OUTREACH (OUTPATIENT)
Dept: ADMINISTRATIVE | Facility: HOSPITAL | Age: 58
End: 2023-10-05
Payer: COMMERCIAL

## 2023-10-05 ENCOUNTER — PATIENT MESSAGE (OUTPATIENT)
Dept: ADMINISTRATIVE | Facility: HOSPITAL | Age: 58
End: 2023-10-05
Payer: COMMERCIAL

## 2023-10-11 ENCOUNTER — PATIENT MESSAGE (OUTPATIENT)
Dept: ADMINISTRATIVE | Facility: OTHER | Age: 58
End: 2023-10-11
Payer: COMMERCIAL

## 2023-10-18 ENCOUNTER — OFFICE VISIT (OUTPATIENT)
Dept: OPTOMETRY | Facility: CLINIC | Age: 58
End: 2023-10-18
Payer: COMMERCIAL

## 2023-10-18 ENCOUNTER — HOSPITAL ENCOUNTER (OUTPATIENT)
Dept: RADIOLOGY | Facility: HOSPITAL | Age: 58
Discharge: HOME | End: 2023-10-18
Attending: INTERNAL MEDICINE
Payer: COMMERCIAL

## 2023-10-18 DIAGNOSIS — H25.13 NUCLEAR SCLEROSIS OF BOTH EYES: ICD-10-CM

## 2023-10-18 DIAGNOSIS — E11.9 TYPE 2 DIABETES MELLITUS WITHOUT RETINOPATHY: Primary | ICD-10-CM

## 2023-10-18 DIAGNOSIS — Z12.31 ENCOUNTER FOR SCREENING MAMMOGRAM FOR BREAST CANCER: ICD-10-CM

## 2023-10-18 PROCEDURE — 92014 PR EYE EXAM, EST PATIENT,COMPREHESV: ICD-10-PCS | Mod: S$GLB,,, | Performed by: OPTOMETRIST

## 2023-10-18 PROCEDURE — 1159F PR MEDICATION LIST DOCUMENTED IN MEDICAL RECORD: ICD-10-PCS | Mod: CPTII,S$GLB,, | Performed by: OPTOMETRIST

## 2023-10-18 PROCEDURE — 3046F PR MOST RECENT HEMOGLOBIN A1C LEVEL > 9.0%: ICD-10-PCS | Mod: CPTII,S$GLB,, | Performed by: OPTOMETRIST

## 2023-10-18 PROCEDURE — 2023F PR DILATED RETINAL EXAM W/O EVID OF RETINOPATHY: ICD-10-PCS | Mod: CPTII,S$GLB,, | Performed by: OPTOMETRIST

## 2023-10-18 PROCEDURE — 3046F HEMOGLOBIN A1C LEVEL >9.0%: CPT | Mod: CPTII,S$GLB,, | Performed by: OPTOMETRIST

## 2023-10-18 PROCEDURE — 77063 MAMMO DIGITAL SCREENING BILAT WITH TOMO: ICD-10-PCS | Mod: 26,,, | Performed by: RADIOLOGY

## 2023-10-18 PROCEDURE — 1160F PR REVIEW ALL MEDS BY PRESCRIBER/CLIN PHARMACIST DOCUMENTED: ICD-10-PCS | Mod: CPTII,S$GLB,, | Performed by: OPTOMETRIST

## 2023-10-18 PROCEDURE — 2023F DILAT RTA XM W/O RTNOPTHY: CPT | Mod: CPTII,S$GLB,, | Performed by: OPTOMETRIST

## 2023-10-18 PROCEDURE — 99999 PR PBB SHADOW E&M-EST. PATIENT-LVL III: CPT | Mod: PBBFAC,,, | Performed by: OPTOMETRIST

## 2023-10-18 PROCEDURE — 77067 SCR MAMMO BI INCL CAD: CPT | Mod: TC,PO

## 2023-10-18 PROCEDURE — 1159F MED LIST DOCD IN RCRD: CPT | Mod: CPTII,S$GLB,, | Performed by: OPTOMETRIST

## 2023-10-18 PROCEDURE — 77067 MAMMO DIGITAL SCREENING BILAT WITH TOMO: ICD-10-PCS | Mod: 26,,, | Performed by: RADIOLOGY

## 2023-10-18 PROCEDURE — 92014 COMPRE OPH EXAM EST PT 1/>: CPT | Mod: S$GLB,,, | Performed by: OPTOMETRIST

## 2023-10-18 PROCEDURE — 99999 PR PBB SHADOW E&M-EST. PATIENT-LVL III: ICD-10-PCS | Mod: PBBFAC,,, | Performed by: OPTOMETRIST

## 2023-10-18 PROCEDURE — 77067 SCR MAMMO BI INCL CAD: CPT | Mod: 26,,, | Performed by: RADIOLOGY

## 2023-10-18 PROCEDURE — 1160F RVW MEDS BY RX/DR IN RCRD: CPT | Mod: CPTII,S$GLB,, | Performed by: OPTOMETRIST

## 2023-10-18 PROCEDURE — 77063 BREAST TOMOSYNTHESIS BI: CPT | Mod: 26,,, | Performed by: RADIOLOGY

## 2023-10-18 NOTE — PROGRESS NOTES
Subjective:       Patient ID: Sharron Redmond is a 58 y.o. female      Chief Complaint   Patient presents with    Concerns About Ocular Health    Diabetic Eye Exam    Hypertensive Eye Exam     History of Present Illness    Dls: 11/15/22 Dr. Adams     59 y/o female presents today for diabetic eye exam  Pt states no changes in vision. Pt wears single vision glasses for reading.      x 1 day     No tearing  No itching  No burning  No pain  No ha's  No floaters  No flashes    Eye meds  None    Pohx:   None    Fohx:  Glaucoma -mother   Cat- father      Hemoglobin A1C       Date                     Value               Ref Range             Status                09/27/2023               11.0 (H)            <5.7 % of tota*       Final                      05/26/2023               10.9 (H)            <5.7 % of tota*       Final                       01/18/2023               12.0 (H)            <5.7 % of tota*       Final              Assessment/Plan:     1. Type 2 diabetes mellitus without retinopathy  No diabetic retinopathy. Discussed with pt the effects of diabetes on vision, importance of good blood sugar control, compliance with meds, and follow up care with PCP. Return in 1 year for dilated eye exam, sooner PRN.    2. Nuclear sclerosis of both eyes  Educated pt on presence of cataracts and effects on vision. No surgery at this time. Recheck in one year, sooner PRN.      Follow up in about 1 year (around 10/18/2024) for Diabetic Eye Exam.

## 2023-10-21 NOTE — TELEPHONE ENCOUNTER
No care due was identified.  [facility] Embedded Care Due Messages. Reference number: [id number].   10/21/2023 5:42:19 AM CDT

## 2023-10-22 RX ORDER — ATORVASTATIN CALCIUM 80 MG/1
TABLET, FILM COATED ORAL
Qty: 90 TABLET | Refills: 3 | Status: SHIPPED | OUTPATIENT
Start: 2023-10-22

## 2023-10-22 NOTE — TELEPHONE ENCOUNTER
Refill Decision Note   Sharron Redmond  is requesting a refill authorization.  Brief Assessment and Rationale for Refill:  Approve     Medication Therapy Plan:         Comments:     Note composed:4:13 AM 10/22/2023

## 2024-02-22 ENCOUNTER — PATIENT OUTREACH (OUTPATIENT)
Dept: ADMINISTRATIVE | Facility: HOSPITAL | Age: 59
End: 2024-02-22
Payer: COMMERCIAL

## 2024-02-22 NOTE — PROGRESS NOTES

## 2024-03-26 ENCOUNTER — PATIENT MESSAGE (OUTPATIENT)
Dept: ADMINISTRATIVE | Facility: HOSPITAL | Age: 59
End: 2024-03-26
Payer: COMMERCIAL

## 2024-07-08 ENCOUNTER — PATIENT OUTREACH (OUTPATIENT)
Dept: ADMINISTRATIVE | Facility: HOSPITAL | Age: 59
End: 2024-07-08
Payer: COMMERCIAL

## 2024-07-08 NOTE — PROGRESS NOTES
Population Health Chart Review & Patient Outreach Details      Additional [facility] Health Notes:               Updates Requested / Reviewed:      Care Everywhere, , and Immunizations Reconciliation Completed or Queried: [facility] Topics Overdue:      [facility] Score: 3     Urine Screening  Hemoglobin A1c  Uncontrolled BP    Tetanus Vaccine  Shingles/Zoster Vaccine  RSV Vaccine                  Health Maintenance Topic(s) Outreach Outcomes & Actions Taken:    Primary Care Appt - Outreach Outcomes & Actions Taken  : Primary Care Appt Scheduled

## 2024-07-22 ENCOUNTER — OFFICE VISIT (OUTPATIENT)
Dept: INTERNAL MEDICINE | Facility: CLINIC | Age: 59
End: 2024-07-22
Payer: COMMERCIAL

## 2024-07-22 VITALS
SYSTOLIC BLOOD PRESSURE: 156 MMHG | WEIGHT: 184.94 LBS | HEIGHT: 62 IN | TEMPERATURE: 97 F | DIASTOLIC BLOOD PRESSURE: 78 MMHG | HEART RATE: 76 BPM | OXYGEN SATURATION: 99 % | RESPIRATION RATE: 16 BRPM | BODY MASS INDEX: 34.03 KG/M2

## 2024-07-22 DIAGNOSIS — I10 ESSENTIAL HYPERTENSION: Chronic | ICD-10-CM

## 2024-07-22 DIAGNOSIS — E11.9 TYPE 2 DIABETES MELLITUS WITHOUT COMPLICATION, WITHOUT LONG-TERM CURRENT USE OF INSULIN: ICD-10-CM

## 2024-07-22 DIAGNOSIS — E78.49 OTHER HYPERLIPIDEMIA: ICD-10-CM

## 2024-07-22 DIAGNOSIS — E66.09 CLASS 1 OBESITY DUE TO EXCESS CALORIES WITH SERIOUS COMORBIDITY AND BODY MASS INDEX (BMI) OF 33.0 TO 33.9 IN ADULT: ICD-10-CM

## 2024-07-22 DIAGNOSIS — N76.0 RECURRENT VAGINITIS: ICD-10-CM

## 2024-07-22 DIAGNOSIS — I10 ESSENTIAL HYPERTENSION: ICD-10-CM

## 2024-07-22 DIAGNOSIS — E11.65 TYPE 2 DIABETES MELLITUS WITH HYPERGLYCEMIA, WITHOUT LONG-TERM CURRENT USE OF INSULIN: Primary | ICD-10-CM

## 2024-07-22 PROCEDURE — 3078F DIAST BP <80 MM HG: CPT | Mod: CPTII,S$GLB,, | Performed by: INTERNAL MEDICINE

## 2024-07-22 PROCEDURE — 1160F RVW MEDS BY RX/DR IN RCRD: CPT | Mod: CPTII,S$GLB,, | Performed by: INTERNAL MEDICINE

## 2024-07-22 PROCEDURE — 3008F BODY MASS INDEX DOCD: CPT | Mod: CPTII,S$GLB,, | Performed by: INTERNAL MEDICINE

## 2024-07-22 PROCEDURE — 1159F MED LIST DOCD IN RCRD: CPT | Mod: CPTII,S$GLB,, | Performed by: INTERNAL MEDICINE

## 2024-07-22 PROCEDURE — 99999 PR PBB SHADOW E&M-EST. PATIENT-LVL V: CPT | Mod: PBBFAC,,, | Performed by: INTERNAL MEDICINE

## 2024-07-22 PROCEDURE — 3046F HEMOGLOBIN A1C LEVEL >9.0%: CPT | Mod: CPTII,S$GLB,, | Performed by: INTERNAL MEDICINE

## 2024-07-22 PROCEDURE — 99214 OFFICE O/P EST MOD 30 MIN: CPT | Mod: S$GLB,,, | Performed by: INTERNAL MEDICINE

## 2024-07-22 PROCEDURE — 3077F SYST BP >= 140 MM HG: CPT | Mod: CPTII,S$GLB,, | Performed by: INTERNAL MEDICINE

## 2024-07-22 PROCEDURE — 3072F LOW RISK FOR RETINOPATHY: CPT | Mod: CPTII,S$GLB,, | Performed by: INTERNAL MEDICINE

## 2024-07-22 RX ORDER — FERROUS GLUCONATE 324(38)MG
1 TABLET ORAL DAILY
COMMUNITY
Start: 2024-03-19 | End: 2025-03-19

## 2024-07-22 RX ORDER — FLUCONAZOLE 150 MG/1
150 TABLET ORAL DAILY
Qty: 2 TABLET | Refills: 3 | Status: SHIPPED | OUTPATIENT
Start: 2024-07-22 | End: 2024-07-24

## 2024-07-22 RX ORDER — ORAL SEMAGLUTIDE 7 MG/1
7 TABLET ORAL DAILY
Qty: 30 TABLET | Refills: 6 | Status: SHIPPED | OUTPATIENT
Start: 2024-07-22

## 2024-07-22 RX ORDER — CARVEDILOL 12.5 MG/1
12.5 TABLET ORAL 2 TIMES DAILY
Qty: 180 TABLET | Refills: 3 | Status: SHIPPED | OUTPATIENT
Start: 2024-07-22

## 2024-07-22 NOTE — PROGRESS NOTES
Subjective:       Patient ID: Sharron Redmond is a 59 y.o. female.    Chief Complaint: Follow-up (4 month)    HPI  The patient presents for follow-up of medical conditions which include type 2 diabetes mellitus, hypertension, hyperlipidemia, and obesity.  The patient states she has been checking her blood sugar levels daily.  Blood sugar levels have been running high up to 200+ range.  She had questions about carbohydrate intake in her diet.  She has noted some vaginal itching intermittently when she voids.  We discussed that this is likely related to her Farxiga therapy.  Chronic symptoms may warrant discontinuing this medication.  She will increase her water intake.  She currently uses topical Monistat intermittently.  We will order Diflucan for intermittent use also.    She has hypertension.  She states her blood pressure is often high in the doctor's office.  She does not monitor pressures at home on a regular basis however.    Review of Systems   Constitutional:  Negative for activity change, appetite change and unexpected weight change.   Eyes:  Negative for visual disturbance.   Respiratory:  Negative for shortness of breath.    Cardiovascular:  Negative for chest pain, palpitations and leg swelling.   Gastrointestinal:  Negative for abdominal pain, blood in stool and diarrhea.   Genitourinary:  Positive for vaginal discharge. Negative for dysuria, frequency, hematuria and urgency.        External vaginal itching is noted after voiding.   Neurological:  Negative for weakness, numbness and headaches.   Psychiatric/Behavioral:  Negative for sleep disturbance.             Physical Exam  Vitals and nursing note reviewed.   Constitutional:       General: She is not in acute distress.     Appearance: Normal appearance. She is well-developed.      Comments: The patient has gained 4 lb since 10/03/2023.   HENT:      Head: Normocephalic and atraumatic.   Eyes:      General: No scleral icterus.     Extraocular  Movements: Extraocular movements intact.      Conjunctiva/sclera: Conjunctivae normal.   Neck:      Thyroid: No thyromegaly.      Vascular: No JVD.   Cardiovascular:      Rate and Rhythm: Normal rate and regular rhythm.      Heart sounds: Normal heart sounds. No murmur heard.     No friction rub. No gallop.   Pulmonary:      Effort: Pulmonary effort is normal. No respiratory distress.      Breath sounds: Normal breath sounds. No wheezing or rales.   Abdominal:      General: Bowel sounds are normal.      Palpations: Abdomen is soft. There is no mass.      Tenderness: There is no abdominal tenderness. There is no right CVA tenderness or left CVA tenderness.   Musculoskeletal:         General: No tenderness. Normal range of motion.      Cervical back: Normal range of motion and neck supple.      Right lower leg: No edema.      Left lower leg: No edema.   Lymphadenopathy:      Cervical: No cervical adenopathy.   Skin:     General: Skin is warm and dry.      Findings: No rash.      Comments: No foot lesions are present.   Neurological:      Mental Status: She is alert and oriented to person, place, and time.      Cranial Nerves: No cranial nerve deficit.      Comments: Sensory exam is intact in both feet on monofilament and vibration testing.   Psychiatric:         Mood and Affect: Mood normal.         Behavior: Behavior normal.         Protective Sensation (w/ 10 gram monofilament):  Right: Intact  Left: Intact    Visual Inspection:  Normal -  Bilateral    Pedal Pulses:   Right: Present  Left: Present    Posterior Tibialis Pulses:   Right:Present  Left: Present    No visits with results within 3 Month(s) from this visit.   Latest known visit with results is:   Office Visit on 10/03/2023   Component Date Value Ref Range Status    Hemoglobin A1C 07/19/2024 12.4 (H)  <5.7 % of total Hgb Final    Comment: For someone without known diabetes, a hemoglobin A1c  value of 6.5% or greater indicates that they may have   diabetes  and this should be confirmed with a follow-up   test.     For someone with known diabetes, a value <7% indicates   that their diabetes is well controlled and a value   greater than or equal to 7% indicates suboptimal   control. A1c targets should be individualized based on   duration of diabetes, age, comorbid conditions, and   other considerations.     Currently, no consensus exists regarding use of  hemoglobin A1c for diagnosis of diabetes for children.         This test was performed on the Roche donna c503 platform.  Effective 11/13/23, a change in test platforms from the  Abbott  to the Roche donna c503 may have shifted  HbA1c results compared to historical results.  Based on laboratory validation testing conducted at  [facility], the Roche platform relative to the Abbott  platform had an average increase in HbA1c value of  < or = 0.3%. This difference is with                           in accepted   variability established by the National Glycohemoglobin  Standardization Program. Note that not all individuals  will have had a shift in their results and direct  comparisons between historical and current results for  testing conducted on different platforms is not  recommended.          Cholesterol 07/19/2024 270 (H)  <200 mg/dL Final    HDL 07/19/2024 58  > OR = 50 mg/dL Final    Triglycerides 07/19/2024 178 (H)  <150 mg/dL Final    LDL Cholesterol 07/19/2024 179 (H)  mg/dL (calc) Final    Comment: Reference range: <100     Desirable range <100 mg/dL for primary prevention;    <70 mg/dL for patients with CHD or diabetic patients   with > or = 2 CHD risk factors.     LDL-C is now calculated using the Brandon-Rufino   calculation, which is a validated novel method providing   better accuracy than the Friedewald equation in the   estimation of LDL-C.   Brandon CARREON et al. THANH. 2013;310(19): 9223-4691   (http://education.[employer]/faq/NSM201)      HDL/Cholesterol Ratio 07/19/2024 4.7  <5.0 (calc) Final     Non HDL Chol. (LDL+VLDL) 07/19/2024 212 (H)  <130 mg/dL (calc) Final    Comment: For patients with diabetes plus 1 major ASCVD risk   factor, treating to a non-HDL-C goal of <100 mg/dL   (LDL-C of <70 mg/dL) is considered a therapeutic   option.      Glucose 07/19/2024 235 (H)  65 - 99 mg/dL Final    Comment:               Fasting reference interval     For someone without known diabetes, a glucose  value >125 mg/dL indicates that they may have  diabetes and this should be confirmed with a  follow-up test.         BUN 07/19/2024 9  7 - 25 mg/dL Final    Creatinine 07/19/2024 0.62  0.50 - 1.05 mg/dL Final    eGFR 07/19/2024 102  > OR = 60 mL/min/1.73m2 Final    BUN/Creatinine Ratio 07/19/2024 SEE NOTE:  6 - 22 (calc) Final    Comment:    Not Reported: BUN and Creatinine are within     reference range.            Sodium 07/19/2024 139  135 - 146 mmol/L Final    Potassium 07/19/2024 4.1  3.5 - 5.3 mmol/L Final    Chloride 07/19/2024 101  98 - 110 mmol/L Final    CO2 07/19/2024 30  20 - 32 mmol/L Final    Calcium 07/19/2024 9.0  8.6 - 10.4 mg/dL Final    Total Protein 07/19/2024 6.8  6.1 - 8.1 g/dL Final    Albumin 07/19/2024 4.0  3.6 - 5.1 g/dL Final    Globulin, Total 07/19/2024 2.8  1.9 - 3.7 g/dL (calc) Final    Albumin/Globulin Ratio 07/19/2024 1.4  1.0 - 2.5 (calc) Final    Total Bilirubin 07/19/2024 0.4  0.2 - 1.2 mg/dL Final    Alkaline Phosphatase 07/19/2024 124  37 - 153 U/L Final    AST 07/19/2024 15  10 - 35 U/L Final    ALT 07/19/2024 17  6 - 29 U/L Final       Assessment & Plan:      Sharron was seen today for follow-up.  A blood pressure recheck with the nurse in 1 month is recommended.  Carvedilol 12.5 mg twice a day will be added for better blood pressure control.    The dose of rubella cyst will be increased to 7 mg daily for better diabetes control.    A trial of Diflucan will be ordered to use for intermittent yeast infections.  The patient may continue to use Monistat externally as needed.   Farxiga may need to be discontinued if the patient continues to have external vaginal irritation.  Hopefully this will lessen with improvement in blood sugar control.  However, the glycosuria associated with use of the Farxiga may cause these symptoms to persist.    Updated lab studies will be obtained in 4 months.    Diagnoses and all orders for this visit:    Type 2 diabetes mellitus with hyperglycemia, without long-term current use of insulin  -     Comprehensive Metabolic Panel; Future  -     Lipid Panel; Future  -     Hemoglobin A1C; Future  -     CBC Auto Differential; Future    Essential hypertension  -     Comprehensive Metabolic Panel; Future  -     Lipid Panel; Future  -     Hemoglobin A1C; Future  -     CBC Auto Differential; Future    Other hyperlipidemia  -     Comprehensive Metabolic Panel; Future  -     Lipid Panel; Future  -     Hemoglobin A1C; Future  -     CBC Auto Differential; Future    Type 2 diabetes mellitus without complication, without long-term current use of insulin  -     Comprehensive Metabolic Panel; Future  -     Lipid Panel; Future  -     Hemoglobin A1C; Future  -     CBC Auto Differential; Future    Class 1 obesity due to excess calories with serious comorbidity and body mass index (BMI) of 33.0 to 33.9 in adult    Recurrent vaginitis    Other orders  -     semaglutide (RYBELSUS) 7 mg tablet; Take 1 tablet (7 mg total) by mouth once daily.  -     carvediloL (COREG) 12.5 MG tablet; Take 1 tablet (12.5 mg total) by mouth 2 (two) times daily. For blood pressure.  -     fluconazole (DIFLUCAN) 150 MG Tab; Take 1 tablet (150 mg total) by mouth once daily. For yeast infection. for 2 days         Follow up in about 4 months (around 11/22/2024).     Eric Najera MD

## 2024-07-22 NOTE — TELEPHONE ENCOUNTER
No care due was identified.  [facility] Embedded Care Due Messages. Reference number: [id number].   7/22/2024 2:54:16 AM CDT

## 2024-07-22 NOTE — TELEPHONE ENCOUNTER
Refill Routing Note   Medication(s) are not appropriate for processing by [facility] for the following reason(s):        Required vitals abnormal    [facility] action(s):  Defer               Appointments  past 12m or future 3m with PCP    Date Provider   Last Visit   10/3/2023 Eirc Najera MD   Next Visit   7/22/2024 Eric Najera MD   ED visits in past 90 days: 0        Note composed:5:03 AM 07/22/2024

## 2024-07-23 RX ORDER — SPIRONOLACTONE 50 MG/1
TABLET, FILM COATED ORAL
Qty: 90 TABLET | Refills: 0 | Status: SHIPPED | OUTPATIENT
Start: 2024-07-23

## 2024-08-05 ENCOUNTER — TELEPHONE (OUTPATIENT)
Dept: INTERNAL MEDICINE | Facility: CLINIC | Age: 59
End: 2024-08-05
Payer: COMMERCIAL

## 2024-08-19 ENCOUNTER — CLINICAL SUPPORT (OUTPATIENT)
Dept: INTERNAL MEDICINE | Facility: CLINIC | Age: 59
End: 2024-08-19
Payer: COMMERCIAL

## 2024-08-19 VITALS — DIASTOLIC BLOOD PRESSURE: 70 MMHG | SYSTOLIC BLOOD PRESSURE: 138 MMHG | HEART RATE: 68 BPM

## 2024-08-19 DIAGNOSIS — I10 ESSENTIAL HYPERTENSION: Primary | ICD-10-CM

## 2024-08-19 PROCEDURE — 99999 PR PBB SHADOW E&M-EST. PATIENT-LVL III: CPT | Mod: PBBFAC,,,

## 2024-08-19 NOTE — PROGRESS NOTES
Bp ck scheduled for today to recheck blood pressure.  It was elevated at last visit with dr moss on 7/22/24.  156/78.    Today is better 138/70 p 68    Similar reading at work/ wrist cuff 138/77.    She has digital bp cuff at home and no longe ris in hypert digital clinic.  Will check 2 times a week and call if getting over this number.     I confirmed she is on all pressure medication listed in her medlist

## 2024-10-15 DIAGNOSIS — E11.9 TYPE 2 DIABETES MELLITUS WITHOUT COMPLICATION, WITHOUT LONG-TERM CURRENT USE OF INSULIN: Chronic | ICD-10-CM

## 2024-10-15 DIAGNOSIS — I10 ESSENTIAL HYPERTENSION: Chronic | ICD-10-CM

## 2024-10-15 RX ORDER — AMLODIPINE BESYLATE 10 MG/1
TABLET ORAL
Qty: 90 TABLET | Refills: 3 | Status: SHIPPED | OUTPATIENT
Start: 2024-10-15

## 2024-10-15 RX ORDER — CHLORTHALIDONE 25 MG/1
TABLET ORAL
Qty: 90 TABLET | Refills: 3 | Status: SHIPPED | OUTPATIENT
Start: 2024-10-15

## 2024-10-15 NOTE — TELEPHONE ENCOUNTER
Refill Routing Note   Medication(s) are not appropriate for processing by [facility] for the following reason(s):        Drug-drug interaction    [facility] action(s):  Defer  Approve      Medication Therapy Plan: ALDACTONE    Pharmacist review requested: Yes     Appointments  past 12m or future 3m with PCP    Date Provider   Last Visit   7/22/2024 Eric Najera MD   Next Visit   12/12/2024 Eric Najera MD   ED visits in past 90 days: 0        Note composed:8:04 AM 10/15/2024

## 2024-10-15 NOTE — TELEPHONE ENCOUNTER
Refill Decision Note   Sharron Ruy  is requesting a refill authorization.  Brief Assessment and Rationale for Refill:  Approve     Medication Therapy Plan:        Pharmacist review requested: Yes   Comments:     Note composed:11:17 AM 10/15/2024

## 2024-10-15 NOTE — TELEPHONE ENCOUNTER
No care due was identified.  [facility] Embedded Care Due Messages. Reference number: [id number].   10/15/2024 5:46:49 AM CDT

## 2024-10-31 DIAGNOSIS — Z12.31 OTHER SCREENING MAMMOGRAM: ICD-10-CM

## 2024-12-11 ENCOUNTER — TELEPHONE (OUTPATIENT)
Dept: INTERNAL MEDICINE | Facility: CLINIC | Age: 59
End: 2024-12-11
Payer: COMMERCIAL

## 2024-12-11 DIAGNOSIS — E11.9 TYPE 2 DIABETES MELLITUS WITHOUT COMPLICATION, WITHOUT LONG-TERM CURRENT USE OF INSULIN: Primary | ICD-10-CM

## 2024-12-11 DIAGNOSIS — D68.59 HYPERCOAGULABLE STATE: ICD-10-CM

## 2024-12-11 DIAGNOSIS — E78.49 OTHER HYPERLIPIDEMIA: ICD-10-CM

## 2024-12-11 NOTE — TELEPHONE ENCOUNTER
I called her  and apologized I couldn't call  Back yetserday.    Lab orders entered 7/22/24 no longer showing  In [employer] computer system.    I re entered w/ urine testing and she will  Go after see's dr moss tomorrow.

## 2024-12-11 NOTE — TELEPHONE ENCOUNTER
----- Message from Med Assistant Hickman sent at 12/10/2024 11:27 AM CST -----  Contact: [phone]    ----- Message -----  From: Doni Peterson  Sent: 12/10/2024  11:07 AM CST  To: Reinaldo HAYES Staff    .[email or web address]     Patient is calling for Medical Advice regarding: [facility] and no orders was sent she needs them sent over and the fax is not working     How long has patient had these symptoms:    Pharmacy name and phone#:    Patient wants a call back or thru [facility]: call back     Comments:    Please advise patient replies from provider may take up to 48 hours.

## 2024-12-12 ENCOUNTER — OFFICE VISIT (OUTPATIENT)
Dept: INTERNAL MEDICINE | Facility: CLINIC | Age: 59
End: 2024-12-12
Payer: COMMERCIAL

## 2024-12-12 VITALS
TEMPERATURE: 97 F | HEIGHT: 62 IN | BODY MASS INDEX: 32.86 KG/M2 | DIASTOLIC BLOOD PRESSURE: 74 MMHG | RESPIRATION RATE: 16 BRPM | SYSTOLIC BLOOD PRESSURE: 140 MMHG | OXYGEN SATURATION: 98 % | HEART RATE: 79 BPM | WEIGHT: 178.56 LBS

## 2024-12-12 DIAGNOSIS — I10 ESSENTIAL HYPERTENSION: ICD-10-CM

## 2024-12-12 DIAGNOSIS — E78.49 OTHER HYPERLIPIDEMIA: ICD-10-CM

## 2024-12-12 DIAGNOSIS — E11.9 TYPE 2 DIABETES MELLITUS WITHOUT COMPLICATION, WITHOUT LONG-TERM CURRENT USE OF INSULIN: Primary | ICD-10-CM

## 2024-12-12 PROCEDURE — 3008F BODY MASS INDEX DOCD: CPT | Mod: CPTII,S$GLB,, | Performed by: INTERNAL MEDICINE

## 2024-12-12 PROCEDURE — 99999 PR PBB SHADOW E&M-EST. PATIENT-LVL V: CPT | Mod: PBBFAC,,, | Performed by: INTERNAL MEDICINE

## 2024-12-12 PROCEDURE — 3077F SYST BP >= 140 MM HG: CPT | Mod: CPTII,S$GLB,, | Performed by: INTERNAL MEDICINE

## 2024-12-12 PROCEDURE — 3078F DIAST BP <80 MM HG: CPT | Mod: CPTII,S$GLB,, | Performed by: INTERNAL MEDICINE

## 2024-12-12 PROCEDURE — 1160F RVW MEDS BY RX/DR IN RCRD: CPT | Mod: CPTII,S$GLB,, | Performed by: INTERNAL MEDICINE

## 2024-12-12 PROCEDURE — 1159F MED LIST DOCD IN RCRD: CPT | Mod: CPTII,S$GLB,, | Performed by: INTERNAL MEDICINE

## 2024-12-12 PROCEDURE — G2211 COMPLEX E/M VISIT ADD ON: HCPCS | Mod: S$GLB,,, | Performed by: INTERNAL MEDICINE

## 2024-12-12 PROCEDURE — 99214 OFFICE O/P EST MOD 30 MIN: CPT | Mod: S$GLB,,, | Performed by: INTERNAL MEDICINE

## 2024-12-12 PROCEDURE — 3072F LOW RISK FOR RETINOPATHY: CPT | Mod: CPTII,S$GLB,, | Performed by: INTERNAL MEDICINE

## 2024-12-12 PROCEDURE — 3046F HEMOGLOBIN A1C LEVEL >9.0%: CPT | Mod: CPTII,S$GLB,, | Performed by: INTERNAL MEDICINE

## 2024-12-12 NOTE — PROGRESS NOTES
Subjective:       Patient ID: Sharron Redmond is a 59 y.o. female.    Chief Complaint: Hypertension (4 mos w [facility] labs.  They didn't show  orders I had prev entered./I entered yesterday again cmp, cbc, lipid, aic.  Want any other.?/She will do later this week), Diabetes, and Hyperlipidemia    History of Present Illness    Sharron presents today for follow-up on multiple chronic conditions.  Active medical conditions include type 2 diabetes mellitus, hypertension, hyperlipidemia, iron-deficiency anemia.    HYPERTENSION:  She reports inconsistent timing in taking her blood pressure medication, often remembering to take it while at work around 6 AM. She did not take her medication on the day of the appointment due to oversleeping.    WEIGHT MANAGEMENT:  She reports a current weight of 178 lbs, down 6 lbs since July. She implemented dietary changes approximately three weeks ago, including limiting intake to two meals per day, avoiding unhealthy snacks, and eliminating soft drinks. She now drinks only water and herbal tea. She has a history of gastric surgery and reports discomfort when overeating.    DIABETES:  She reports recent blood sugar readings were around 323 before starting her new diet three weeks ago, noting that her blood sugar tends to run higher in the morning. She currently takes Farcega daily, Glipizide twice daily with meals, Robelsis daily, and Metformin 750mg twice daily. Her new dietary approach includes timing her last meal around 4 PM to allow for better overnight blood sugar control.    IRON DEFICIENCY:  She was diagnosed with iron deficiency by her hematologist. No internal bleeding was found during endoscopic procedures, including colonoscopy and capsule endoscopy. She has experimented with different iron supplements, including natural plant-based iron pills. The iron deficiency may be related to her previous gastric surgery affecting nutrient absorption. She reports occasional constipation  when taking iron pills, requiring dietary adjustments to manage.    OTHER SYMPTOMS:  She denies recent acid reflux issues since her surgery. She experiences tingling in her middle finger and reports one instance of lightheadedness, which her hematologist attributed to possible iron deficiency anemia.    PREVENTIVE CARE:  She receives annual flu vaccines at work during wellness events and reports all other vaccinations are up to date. She has an upcoming eye exam scheduled on February 28, 2025.    EXERCISE:  She reports having a home gym setup being installed tomorrow, expressing a strong preference for working out at home. She indicates that having exercise equipment readily available makes a significant difference in her ability to maintain an active lifestyle.      ROS:  Constitutional: +lightheadedness  Gastrointestinal: +constipation, -heartburn, -change in bowel habits              Physical Exam  Vitals and nursing note reviewed.   Constitutional:       General: She is not in acute distress.     Appearance: Normal appearance. She is well-developed.   HENT:      Head: Normocephalic and atraumatic.   Eyes:      General: No scleral icterus.     Extraocular Movements: Extraocular movements intact.      Conjunctiva/sclera: Conjunctivae normal.   Neck:      Thyroid: No thyromegaly.      Vascular: No JVD.   Cardiovascular:      Rate and Rhythm: Normal rate and regular rhythm.      Heart sounds: Normal heart sounds. No murmur heard.     No friction rub. No gallop.   Pulmonary:      Effort: Pulmonary effort is normal. No respiratory distress.      Breath sounds: Normal breath sounds. No wheezing or rales.   Abdominal:      General: Bowel sounds are normal.      Palpations: Abdomen is soft. There is no mass.      Tenderness: There is no abdominal tenderness. There is no right CVA tenderness or left CVA tenderness.   Musculoskeletal:         General: No tenderness. Normal range of motion.      Cervical back: Normal range  of motion and neck supple.      Right lower leg: No edema.      Left lower leg: No edema.   Lymphadenopathy:      Cervical: No cervical adenopathy.   Skin:     General: Skin is warm and dry.      Findings: No rash.      Comments: No foot lesions are present.   Neurological:      Mental Status: She is alert and oriented to person, place, and time.      Cranial Nerves: No cranial nerve deficit.      Comments: Sensory exam is intact in both feet on monofilament and vibration testing.   Psychiatric:         Mood and Affect: Mood normal.         Behavior: Behavior normal.         Protective Sensation (w/ 10 gram monofilament):  Right: Intact  Left: Intact    Visual Inspection:  Normal -  Bilateral    Pedal Pulses:   Right: Present  Left: Present    Posterior Tibialis Pulses:   Right:Present  Left: Present        Assessment & Plan:     Assessment & Plan     Assessed blood pressure, which was elevated at 140/74   Evaluated weight loss progress, noting 6-pound decrease since July   Reviewed diabetes management, including recent blood sugar readings and dietary changes   Considered iron deficiency anemia, noting previous iron infusions and current supplementation   Assessed for diabetic neuropathy, finding no loss of sensation in feet    TYPE 2 DIABETES MELLITUS:   Explained how fasting blood sugar reflect liver glucose production.   Sharron to continue current diet modifications: limit to 2 meals per day, avoid snacking between meals, choose fruit or vegetables for snacks if necessary, avoid soft drinks, chips, candy, and chocolates, consume water and herbal tea only.   Sharron to maintain regular physical activity using home gym equipment.   Continued Farxiga daily, Glipizide twice daily with meals, Rybelsus daily, Metformin 750m tablets in the morning, 1 tablet in the evening.    HYPERTENSION:   Discussed complementary roles of chlorthalidone and spironolactone in managing fluid balance and potassium levels.    Continued Amlodipine daily, Carvedilol twice daily, Chlorthalidone daily, Spironolactone daily.    IRON DEFICIENCY ANEMIA:   Continued iron supplementation daily.    BARIATRIC SURGERY STATUS:   Provided information on potential vitamin deficiencies following gastric bypass surgery.    MEDICATION MANAGEMENT:   Explained importance of consistent timing for medication administration.    FOLLOW-UP AND LAB WORK:   urinalysis ordered.   Comprehensive metabolic panel and other routine labs ordered.   Follow up to complete ordered lab work at [facility].         Follow up in about 4 months (around 4/12/2025).     Eric Najera MD

## 2024-12-19 ENCOUNTER — OFFICE VISIT (OUTPATIENT)
Dept: URGENT CARE | Facility: CLINIC | Age: 59
End: 2024-12-19
Payer: COMMERCIAL

## 2024-12-19 VITALS
DIASTOLIC BLOOD PRESSURE: 84 MMHG | SYSTOLIC BLOOD PRESSURE: 173 MMHG | HEIGHT: 62 IN | TEMPERATURE: 99 F | BODY MASS INDEX: 32.76 KG/M2 | WEIGHT: 178 LBS | OXYGEN SATURATION: 97 % | HEART RATE: 95 BPM | RESPIRATION RATE: 18 BRPM

## 2024-12-19 DIAGNOSIS — J06.9 VIRAL URI: Primary | ICD-10-CM

## 2024-12-19 LAB
CTP QC/QA: YES
CTP QC/QA: YES
POC MOLECULAR INFLUENZA A AGN: NEGATIVE
POC MOLECULAR INFLUENZA B AGN: NEGATIVE
SARS-COV-2 AG RESP QL IA.RAPID: NEGATIVE

## 2024-12-19 PROCEDURE — 87502 INFLUENZA DNA AMP PROBE: CPT | Mod: QW,S$GLB,,

## 2024-12-19 RX ORDER — CETIRIZINE HYDROCHLORIDE 10 MG/1
10 TABLET ORAL DAILY
Qty: 30 TABLET | Refills: 0 | Status: SHIPPED | OUTPATIENT
Start: 2024-12-19 | End: 2025-01-18

## 2024-12-19 RX ORDER — BENZONATATE 200 MG/1
200 CAPSULE ORAL 3 TIMES DAILY PRN
Qty: 30 CAPSULE | Refills: 0 | Status: SHIPPED | OUTPATIENT
Start: 2024-12-19 | End: 2024-12-29

## 2024-12-19 NOTE — LETTER
December 19, 2024      [facility]  [address]  [place] [address]  Phone: [phone]  Fax: [phone]       Patient: Sharron Redmond   YOB: 1965  Date of Visit: 12/19/2024    To Whom It May Concern:    Mo Redmond  was at [facility] on 12/19/2024. The patient may return to work on 12/21/24. If you have any questions or concerns, or if I can be of further assistance, please do not hesitate to contact me.    Sincerely,    Mukesh Deleon PA-C

## 2024-12-19 NOTE — PROGRESS NOTES
"Subjective:      Patient ID: Sharron Redmond is a 59 y.o. female.    Vitals:  height is 5' 2" (1.575 m) and weight is 80.7 kg (178 lb). Her oral temperature is 99 °F (37.2 °C). Her blood pressure is 173/84 (abnormal) and her pulse is 95. Her respiration is 18 and oxygen saturation is 97%.     Chief Complaint: URI    59-year-old female here for nasal congestion, rhinorrhea, coughing that started 2 days ago.  Taking TheraFlu.  Denies fever, chills, nausea, vomiting, diarrhea, chest pain, SOB.    URI   This is a new problem. The current episode started in the past 7 days. The problem has been unchanged. There has been no fever. The cough is Productive. Associated symptoms include congestion, coughing, ear pain, rhinorrhea and sinus pain. Pertinent negatives include no abdominal pain, chest pain, diarrhea, headaches, nausea, neck pain, rash or vomiting. She has tried decongestant for the symptoms. The treatment provided mild relief.       Constitution: Negative for chills and fever.   HENT:  Positive for ear pain, congestion, sinus pain and sinus pressure. Negative for ear discharge.    Neck: Negative for neck pain.   Cardiovascular:  Negative for chest pain.   Respiratory:  Positive for cough and sputum production. Negative for shortness of breath.    Gastrointestinal:  Negative for abdominal pain, nausea, vomiting and diarrhea.   Skin:  Negative for rash.   Neurological:  Negative for dizziness and headaches.      Objective:     Physical Exam   Constitutional: She is oriented to person, place, and time. She appears well-developed.   HENT:   Head: Normocephalic and atraumatic.   Ears:   Right Ear: Tympanic membrane, external ear and ear canal normal.   Left Ear: Tympanic membrane, external ear and ear canal normal.   Nose: Rhinorrhea and congestion present.   Mouth/Throat: Oropharynx is clear and moist. Mucous membranes are moist. Oropharynx is clear.   Eyes: Conjunctivae, EOM and lids are normal. Pupils are equal, " round, and reactive to light.   Neck: Trachea normal and phonation normal. Neck supple.   Cardiovascular: Normal rate, regular rhythm, normal heart sounds and normal pulses.   Pulmonary/Chest: Effort normal and breath sounds normal. No respiratory distress.   Musculoskeletal: Normal range of motion.         General: Normal range of motion.   Neurological: no focal deficit. She is alert and oriented to person, place, and time.   Skin: Skin is warm, dry and intact. Capillary refill takes less than 2 seconds.   Psychiatric: Her speech is normal and behavior is normal. Judgment and thought content normal.   Nursing note and vitals reviewed.    Results for orders placed or performed in visit on 12/19/24   POCT Influenza A/B MOLECULAR    Collection Time: 12/19/24 11:23 AM   Result Value Ref Range    POC Molecular Influenza A Ag Negative Negative    POC Molecular Influenza B Ag Negative Negative     Acceptable Yes    SARS Coronavirus 2 Antigen, POCT Manual Read    Collection Time: 12/19/24 11:39 AM   Result Value Ref Range    SARS Coronavirus 2 Antigen Negative Negative     Acceptable Yes          Assessment:     1. Viral URI        Plan:       Viral URI  -     Cancel: SARS Coronavirus 2 Antigen, POCT Manual Read  -     POCT Influenza A/B MOLECULAR  -     SARS Coronavirus 2 Antigen, POCT Manual Read  -     benzonatate (TESSALON) 200 MG capsule; Take 1 capsule (200 mg total) by mouth 3 (three) times daily as needed for Cough.  Dispense: 30 capsule; Refill: 0  -     cetirizine (ZYRTEC) 10 MG tablet; Take 1 tablet (10 mg total) by mouth once daily.  Dispense: 30 tablet; Refill: 0                Patient Instructions   - Rest.    - Drink plenty of fluids.  - Viral upper respiratory infections typically run their course in 10-14 days.      - You can take over-the-counter claritin, zyrtec, allegra, or xyzal as directed. These are antihistamines that can help with runny nose, nasal congestion,  sneezing, and helps to dry up post-nasal drip, which usually causes sore throat and cough.              - If you do NOT have high blood pressure, you may use a decongestant form (D)  of this medication (ie. Claritin- D, zyrtec-D, allegra-D) or if you do not take the D form, you can take sudafed (pseudoephedrine) over the counter, which is a decongestant. Do NOT take two decongestant (D) medications at the same time (such as mucinex-D and claritin-D or plain sudafed and claritin D)    - If you DO have Hypertension, anxiety, or palpitations, it is safe to take Coricidin HBP for relief of sinus symptoms.     - You can use Flonase (fluticasone) nasal spray as directed for sinus congestion and postnasal drip. This is a steroid nasal spray that works locally over time to decrease the inflammation in your nose/sinuses and help with allergic symptoms. This is not an quick- relief spray like afrin, but it works well if used daily.  Discontinue if you develop nose bleed  - use nasal saline prior to Flonase.  - Use Ocean Spray Nasal Saline 1-3 puffs each nostril every 2-3 hours then blow out onto tissue. This is to irrigate the nasal passage way to clear the sinus openings. Use until sinus problem resolved.     - you can take plain Mucinex (guaifenesin) 1200 mg twice a day to help loosen mucous.      -warm salt water gargles can help with sore throat     - warm tea with honey can help with cough. Honey is a natural cough suppressant.     - Dextromethorphan (DM) is a cough suppressant over the counter (ie. mucinex DM, robitussin, delsym; dayquil/nyquil has DM as well.)        - Follow up with your PCP or specialty clinic as directed in the next 1-2 weeks if not improved or as needed.  You can call [phone] to schedule an appointment with the appropriate provider.       - Go to the ER if you develop new or worsening symptoms.      - You must understand that you have received an Urgent Care treatment only and that you may  be released before all of your medical problems are known or treated.   - You, the patient, will arrange for follow up care as instructed.   - If your condition worsens or fails to improve we recommend that you receive another evaluation at the ER immediately or contact your PCP to discuss your concerns or return here.

## 2024-12-19 NOTE — PATIENT INSTRUCTIONS

## 2024-12-24 ENCOUNTER — HOSPITAL ENCOUNTER (OUTPATIENT)
Dept: RADIOLOGY | Facility: HOSPITAL | Age: 59
Discharge: HOME | End: 2024-12-24
Attending: INTERNAL MEDICINE
Payer: COMMERCIAL

## 2024-12-24 DIAGNOSIS — Z12.31 OTHER SCREENING MAMMOGRAM: ICD-10-CM

## 2024-12-24 PROCEDURE — 77063 BREAST TOMOSYNTHESIS BI: CPT | Mod: TC,PO

## 2024-12-24 PROCEDURE — 77063 BREAST TOMOSYNTHESIS BI: CPT | Mod: 26,,, | Performed by: RADIOLOGY

## 2024-12-24 PROCEDURE — 77067 SCR MAMMO BI INCL CAD: CPT | Mod: 26,,, | Performed by: RADIOLOGY

## 2025-01-17 ENCOUNTER — PATIENT MESSAGE (OUTPATIENT)
Dept: ADMINISTRATIVE | Facility: HOSPITAL | Age: 60
End: 2025-01-17
Payer: COMMERCIAL

## 2025-02-28 ENCOUNTER — OFFICE VISIT (OUTPATIENT)
Dept: OPTOMETRY | Facility: CLINIC | Age: 60
End: 2025-02-28
Payer: COMMERCIAL

## 2025-02-28 DIAGNOSIS — E11.36 TYPE 2 DIABETES MELLITUS WITH DIABETIC CATARACT, WITHOUT LONG-TERM CURRENT USE OF INSULIN: Primary | ICD-10-CM

## 2025-02-28 DIAGNOSIS — H25.13 NUCLEAR SCLEROSIS OF BOTH EYES: ICD-10-CM

## 2025-02-28 PROCEDURE — 99999 PR PBB SHADOW E&M-EST. PATIENT-LVL II: CPT | Mod: PBBFAC,,, | Performed by: OPTOMETRIST

## 2025-02-28 NOTE — PROGRESS NOTES
Subjective:       Patient ID: Sharron Redmond is a 59 y.o. female      Chief Complaint   Patient presents with    Concerns About Ocular Health    Diabetic Eye Exam     History of Present Illness  Dls: 10/18/23 Dr. Adams     58 y/o female presents today for diabetic eye exam.   Pt states no changes in vision. Pt wears single vision glasses for reading       LBS ?     + ou  tearing  No itching  No burning  No pain  No ha's  No floaters  No flashes    Eye meds  None    Pohx:   None    Fohx:   Glaucoma - mother   Cat- father     Hemoglobin A1C       Date                     Value               Ref Range             Status                07/19/2024               12.4 (H)            <5.7 % of tota*       Final                  09/27/2023               11.0 (H)            <5.7 % of tota*       Final                     05/26/2023               10.9 (H)            <5.7 % of tota*       Final                    Assessment/Plan:     1. Type 2 diabetes mellitus with diabetic cataract, without long-term current use of insulin (Primary)  No diabetic retinopathy. Discussed with pt the effects of diabetes on vision, importance of good blood sugar control, compliance with meds, and follow up care with PCP. Return in 1 year for dilated eye exam, sooner PRN.    2. Nuclear sclerosis of both eyes  Educated pt on presence of cataracts and effects on vision. No surgery at this time. Recheck in one year, sooner PRN.      Follow up in about 1 year (around 2/28/2026) for Diabetic Eye Exam.

## 2025-04-22 ENCOUNTER — OFFICE VISIT (OUTPATIENT)
Dept: INTERNAL MEDICINE | Facility: CLINIC | Age: 60
End: 2025-04-22
Payer: COMMERCIAL

## 2025-04-22 VITALS
TEMPERATURE: 97 F | SYSTOLIC BLOOD PRESSURE: 140 MMHG | DIASTOLIC BLOOD PRESSURE: 72 MMHG | HEART RATE: 71 BPM | BODY MASS INDEX: 32.02 KG/M2 | HEIGHT: 62 IN | WEIGHT: 174 LBS | OXYGEN SATURATION: 99 % | RESPIRATION RATE: 16 BRPM

## 2025-04-22 DIAGNOSIS — E66.811 CLASS 1 OBESITY WITH SERIOUS COMORBIDITY AND BODY MASS INDEX (BMI) OF 31.0 TO 31.9 IN ADULT, UNSPECIFIED OBESITY TYPE: ICD-10-CM

## 2025-04-22 DIAGNOSIS — I10 ESSENTIAL HYPERTENSION: ICD-10-CM

## 2025-04-22 DIAGNOSIS — E11.65 TYPE 2 DIABETES MELLITUS WITH HYPERGLYCEMIA, WITHOUT LONG-TERM CURRENT USE OF INSULIN: Primary | ICD-10-CM

## 2025-04-22 DIAGNOSIS — E78.49 OTHER HYPERLIPIDEMIA: ICD-10-CM

## 2025-04-22 PROCEDURE — 3008F BODY MASS INDEX DOCD: CPT | Mod: CPTII,S$GLB,, | Performed by: INTERNAL MEDICINE

## 2025-04-22 PROCEDURE — 3046F HEMOGLOBIN A1C LEVEL >9.0%: CPT | Mod: CPTII,S$GLB,, | Performed by: INTERNAL MEDICINE

## 2025-04-22 PROCEDURE — 3066F NEPHROPATHY DOC TX: CPT | Mod: CPTII,S$GLB,, | Performed by: INTERNAL MEDICINE

## 2025-04-22 PROCEDURE — 3061F NEG MICROALBUMINURIA REV: CPT | Mod: CPTII,S$GLB,, | Performed by: INTERNAL MEDICINE

## 2025-04-22 PROCEDURE — 3077F SYST BP >= 140 MM HG: CPT | Mod: CPTII,S$GLB,, | Performed by: INTERNAL MEDICINE

## 2025-04-22 PROCEDURE — 99999 PR PBB SHADOW E&M-EST. PATIENT-LVL V: CPT | Mod: PBBFAC,,, | Performed by: INTERNAL MEDICINE

## 2025-04-22 PROCEDURE — 99214 OFFICE O/P EST MOD 30 MIN: CPT | Mod: S$GLB,,, | Performed by: INTERNAL MEDICINE

## 2025-04-22 PROCEDURE — 3078F DIAST BP <80 MM HG: CPT | Mod: CPTII,S$GLB,, | Performed by: INTERNAL MEDICINE

## 2025-04-22 RX ORDER — FLUCONAZOLE 150 MG/1
150 TABLET ORAL DAILY
Qty: 2 TABLET | Refills: 0 | Status: SHIPPED | OUTPATIENT
Start: 2025-04-22

## 2025-04-22 RX ORDER — EZETIMIBE 10 MG/1
10 TABLET ORAL DAILY
Qty: 90 TABLET | Refills: 3 | Status: SHIPPED | OUTPATIENT
Start: 2025-04-22 | End: 2026-04-22

## 2025-04-22 RX ORDER — ORAL SEMAGLUTIDE 14 MG/1
14 TABLET ORAL DAILY
Qty: 30 TABLET | Refills: 11 | Status: SHIPPED | OUTPATIENT
Start: 2025-04-22 | End: 2025-04-30

## 2025-04-22 NOTE — PROGRESS NOTES
Subjective:       Patient ID: Sharron Redmond is a 59 y.o. female.    Chief Complaint: Diabetes (4 mos w [facility] labs printed ) and Hypertension    History of Present Illness    CHIEF COMPLAINT:  Sharron presents today for follow up of diabetes and hypertension    HYPERTENSION:  She reports a blood pressure reading of 140/62 this morning prior to medication. Her blood pressure tends to be elevated in the morning and typically drops around noon.  Current blood pressure medications include amlodipine, carvedilol, chlorthalidone, and spironolactone.  No medication side effects have been noted.    DIABETES:  Her blood sugar readings fluctuate throughout the day with morning fasting glucose around 218, afternoon readings around 184-185. She experiences peripheral neuropathy symptoms with elevated blood sugar, including tingling in feet and occasional blurry vision. She reports frequent consumption of sweets, particularly chocolate, cakes, and pies.  Current medications include metformin, Farxiga, Glipizide, and Rybelsus.  The patient is using atorvastatin for management of cholesterol.    LABS:  HbA1c was 12. Cholesterol is elevated at 285 with LDL of 183. Urinalysis shows 2+ glucose, trace ketones, positive nitrites, trace protein, and leukocyte esterase.    MUSCULOSKELETAL:  She has bilateral knee arthritis, more severe on the right, with occasional exacerbations of pain. She reports intermittent knee swelling with associated tightness.    SOCIAL HISTORY:  She works 16-hour shifts four days per week, which impacts her medication management. She reports difficulty with medication administration during the second half of her shift due to workplace restrictions.      ROS:  Eyes: +blurry vision  Gastrointestinal: -diarrhea  Genitourinary: -dysuria  Musculoskeletal: +joint pain, +joint swelling, -back pain, -limb pain  Neurological: +tingling              Physical Exam  Vitals and nursing note reviewed.   Constitutional:        General: She is not in acute distress.     Appearance: Normal appearance. She is well-developed.   HENT:      Head: Normocephalic and atraumatic.   Eyes:      General: No scleral icterus.     Extraocular Movements: Extraocular movements intact.      Conjunctiva/sclera: Conjunctivae normal.   Neck:      Thyroid: No thyromegaly.      Vascular: No JVD.   Cardiovascular:      Rate and Rhythm: Normal rate and regular rhythm.      Heart sounds: Normal heart sounds. No murmur heard.     No friction rub. No gallop.   Pulmonary:      Effort: Pulmonary effort is normal. No respiratory distress.      Breath sounds: Normal breath sounds. No wheezing or rales.   Abdominal:      General: Bowel sounds are normal.      Palpations: Abdomen is soft. There is no mass.      Tenderness: There is no abdominal tenderness. There is no right CVA tenderness or left CVA tenderness.   Musculoskeletal:         General: No tenderness. Normal range of motion.      Cervical back: Normal range of motion and neck supple.      Right lower leg: No edema.      Left lower leg: No edema.   Lymphadenopathy:      Cervical: No cervical adenopathy.   Skin:     General: Skin is warm and dry.      Findings: No rash.      Comments: No foot lesions are present.   Neurological:      Mental Status: She is alert and oriented to person, place, and time.      Cranial Nerves: No cranial nerve deficit.      Comments: Sensory exam is intact in both feet on monofilament and vibration testing.   Psychiatric:         Mood and Affect: Mood normal.         Behavior: Behavior normal.       Protective Sensation (w/ 10 gram monofilament):  Right: Intact  Left: Intact    Visual Inspection:  Normal -  Bilateral    Pedal Pulses:   Right: Present  Left: Present    Posterior Tibialis Pulses:   Right:Present  Left: Present          Assessment & Plan:     Assessment & Plan     Diabetes control is poor with HbA1c of 12 and fasting glucose consistently over 200 mg/dL.   Cholesterol  levels are elevated with total cholesterol at 285 mg/dL and LDL at 183 mg/dL.   Monitoring for signs of diabetic neuropathy, which appears minimal at present.   Evaluated need for insulin therapy if oral medications and GLP-1 agonists fail to achieve adequate glycemic control.   Addressing potential yeast infection related to glucosuria from both poorly controlled diabetes and SGLT2 inhibitor use.    TYPE 2 DIABETES MELLITUS:   Assessed the patient's blood sugar control as poor based on high fasting glucose over 200, post-meal glucose around 185, and HbA1c of 12.   Explained the luis manuel phenomenon, where liver glucose production overnight can lead to high morning glucose levels in diabetes.   Discussed how SGLT2 inhibitors like Farxiga work by increasing glucose excretion in urine.   Educated the patient on the relationship between high glucose and increased risk of yeast infections.   Increased Rybelsus dosage from 7 mg to 14 mg weekly (advised to take two 7 mg doses until supply is used up, then switch to 14 mg dose) to improve glycemic control.   Continued current diabetes medications: Farxiga 10 mg daily, glipizide 10 mg twice daily, metformin (2 tablets in morning, 1 in evening), and Rybelsus daily.   Considered the possibility of insulin therapy if current treatments fail to control blood sugar.   Emphasized the importance of dietary control in managing diabetes, particularly limiting intake of sweets and starchy foods.   Recommend implementing a stricter diet focusing on fruits, vegetables, and low glycemic index foods.   Sharron to reduce intake of sweets, particularly chocolate and baked goods.   Sharron to limit consumption of starchy foods like potatoes and rice.   Recommend increasing physical activity and mobility, especially when sitting for long periods at work.    DIABETIC NEUROPATHY:   Noted the patient reports occasional tingling in feet when blood sugar is high.   Performed sensory exam on feet,  finding no loss of sensation.    DIABETIC RETINOPATHY:   Confirmed the patient had a recent diabetic eye exam.   Noted the patient reports no blurry vision or pressure in eyes from recent eye exam.    HYPERTENSION:   Noted the patient's reported blood pressure of 140/62 in the morning before taking medication.   Observed that the patient's blood pressure is high in the morning and drops later in the day.   Continued current blood pressure medications: amlodipine 10 mg daily, carvedilol 12.5 mg twice daily, chlorthalidone 25 mg daily, and spironolactone 50 mg daily.    HYPERLIPIDEMIA:   Noted the patient's cholesterol level is 285 (should be under 200) and LDL is 183 (should be less than 100).   Continued Lipitor (Atorvastatin) for cholesterol management.   Initiated ezetimibe (Zetia) daily in addition to atorvastatin for better cholesterol management.    OSTEOARTHRITIS OF KNEE:   Noted the patient reports occasional swelling and tightness in both knees, with the right knee being worse.   Recommend getting up and walking to improve mobility when sitting at a desk for long periods.    URINARY FINDINGS:   Noted urine test shows 2+ glucose in the urine.   Explained that glucose in urine is due to high blood sugar and the effect of Farxiga medication.   Noted urine test shows trace ketones.   Noted urine test shows trace protein, which patient has had before.   Observed microalbumin creatinine ratio is low a  t 4, indicating good kidney function.   Noted urine test shows positive nitrites and leukocyte esterase, which patient has had before.   Confirmed the patient denies any urinary symptoms such as burning during urination.   Prescribed fluconazole (Diflucan) 2 tablets as a single dose for potential yeast infection.    FOLLOW-UP:   Referred the patient to Jason Dalal, diabetes NP specialist, for additional diabetes management.   Instructed the patient to follow up with diabetes specialist before next appointment with  me.        Follow up in about 4 months (around 8/22/2025).     Eric Najera MD

## 2025-04-30 ENCOUNTER — OFFICE VISIT (OUTPATIENT)
Dept: INTERNAL MEDICINE | Facility: CLINIC | Age: 60
End: 2025-04-30
Payer: COMMERCIAL

## 2025-04-30 VITALS
OXYGEN SATURATION: 98 % | WEIGHT: 176.81 LBS | HEART RATE: 92 BPM | HEIGHT: 62 IN | BODY MASS INDEX: 32.54 KG/M2 | DIASTOLIC BLOOD PRESSURE: 70 MMHG | SYSTOLIC BLOOD PRESSURE: 164 MMHG

## 2025-04-30 DIAGNOSIS — E78.2 MIXED HYPERLIPIDEMIA: ICD-10-CM

## 2025-04-30 DIAGNOSIS — I10 ESSENTIAL HYPERTENSION: Chronic | ICD-10-CM

## 2025-04-30 DIAGNOSIS — E11.65 TYPE 2 DIABETES MELLITUS WITH HYPERGLYCEMIA, WITHOUT LONG-TERM CURRENT USE OF INSULIN: Primary | ICD-10-CM

## 2025-04-30 LAB — GLUCOSE SERPL-MCNC: 212 MG/DL (ref 70–110)

## 2025-04-30 PROCEDURE — 99999 PR PBB SHADOW E&M-EST. PATIENT-LVL V: CPT | Mod: PBBFAC,,,

## 2025-04-30 RX ORDER — SEMAGLUTIDE 0.68 MG/ML
0.5 INJECTION, SOLUTION SUBCUTANEOUS
Qty: 3 ML | Refills: 3 | Status: SHIPPED | OUTPATIENT
Start: 2025-04-30

## 2025-04-30 RX ORDER — INSULIN GLARGINE 100 [IU]/ML
10 INJECTION, SOLUTION SUBCUTANEOUS NIGHTLY
Qty: 6 ML | Refills: 3 | Status: SHIPPED | OUTPATIENT
Start: 2025-04-30 | End: 2026-04-30

## 2025-04-30 RX ORDER — BLOOD-GLUCOSE SENSOR
1 EACH MISCELLANEOUS
Qty: 3 EACH | Refills: 11 | Status: SHIPPED | OUTPATIENT
Start: 2025-04-30 | End: 2026-04-30

## 2025-04-30 NOTE — PATIENT INSTRUCTIONS
Continue monitoring your blood sugars using your Dexcom G7       Blood glucose goals:   Fasting blood glucose goal:  mg/dL.  Premeal blood glucose goal:  mg/dL.  Postmeal blood glucose goal ( 2 hrs): <180 mg/dL.  Bedtime / overnight blood glucose goal:  mg/dL.       Stop Rybelsus      Stop Glipizide      Continue Farxiga 10mg daily.      Continue Metformin XR 750mg BID wm      Start taking Atorvastatin at bedtime.      Start Ozempic 0.5mg weekly.   *Be sure to take your injection on the same day every week. You can take your injection with or without food.   *Please notify the office if you start to experience severe nausea, vomiting, stomach or back pain after taking this medication.        When taking Ozempic, you should limit foods that can increase the risk of side effects like nausea, vomiting, diarrhea, and stomach pain. These foods include:   High-fat foods: Fried foods, burgers, pizza, doughnuts, and other greasy foods   Sugary foods and drinks: Soda, juice, cakes, cookies, and other sweetened beverages   Refined carbohydrates: White bread, crackers, white flour, and white rice   Processed foods: Chips, pastries, and other ultra-processed foods   Spicy foods: Hot sauce, salsa, hot peppers, and other spicy foods   High glycemic index foods: Potatoes, cereal, pretzels, sports drinks, and other foods that raise blood sugar quickly   Acidic foods: Tomato sauce, citrus fruits, and coffee   Foods that contain caffeine: Coffee, carbonated beverages, and other caffeinated drinks   You should also limit high-fiber foods if you experience diarrhea or abdominal pain.        If your blood sugar is low, follow the 15-15 rule: Have 15 grams of carbs, then wait 15 minutes. If the blood glucose level is less than 70, treat with 15 g of fast-acting carbohydrate, such as 4 oz of fruit juice or three or four glucose tablets; and  recheck the blood glucose level after 15 minutes to ensure that it has  normalized.  Check your blood sugar again. If it's still less than 70 mg/dL, repeat this process.        Refer to the food brochures when planning meals.        Start exercising for 30 minutes at least 3 times / week as tolerates. Be sure to mix cardio with strength training.         Follow up on  5/9/2025 at 4 pm.

## 2025-04-30 NOTE — PROGRESS NOTES
CHIEF COMPLAINT: Type 2 Diabetes    Referred by PCP: Dr. Najera  Previously managed by: PCP  CDE person of contact (if needed): Ene Montiel       HPI: Mrs. Sharron Redmond is a 59 y.o. female who was diagnosed with Type 2 DM in 2007.     Last A1c: 11.9%    Hx of hospitalization for DM? No   Hx of HTN? yes  Hx of CKD? No   Hx of CHF? No   Hx of CVA? No   Hx of MI? No   Hx of Pancreatitis? No   Hx of DM neuropathy? No   Hx DR? No   Hx of Gastroparesis? No       VISIT SUMMARY:  Labs reviewed: GFR > 60; Lipid 304/158/71/201;   Reports finger-sticks 2-3 days weekly  Reports FBG range 185 - 260  POCT collected during visit 212, reports ate lunch over 2 hours earlier.  /70  Reports taking Rybelsus with other medications and twice daily  Discussed stopping Rybelsus and start Ozempic 0.5mg weekly.   Discussed starting Ozempic 0.5mg weekly.  Discussed how Ozempic lowers the risk of major cardiovascular events.  Also discussed how Ozempic lowest risk of worsening kidney disease, kidney failure ( end-stage kidney disease), and death due to cardiovascular disease.   Discussed stop Glipizide and start Lantus 10 units at bedtime  Discussed starting Dexcom G7   Dexcom G7 sensor applied on patient. Sensor provided by provider. Provider set up alan on patient's phone.  Patient sharing glucose readings with the clinic.   BG during visit 230  Reports taking statin in the morning      BG monitoring:   Name: FreeStyle meter  How often: 2-3 days week  BG range: 185-260      Lifestyle:   Diet: 2 meals daily  Exercise: no regular exercise      Previous Diabetes Meds Tried:  none        Current Diabetic Meds:   Rybelsus 14mg daily  Glipizide 10mg BID wm  Farxiga 10mg daily  Metformin XR 750mg BID wm    Diabetes Management Status:  Statin: Taking  ACE/ARB: Not taking      Foot exam due: 4/22/2026  Eye exam due: 2/28/2026      Screening or Prevention Patient's value Goal Complete/Controlled?   HgA1C Testing and Control   Lab Results  "  Component Value Date    HGBA1C 11.9 (H) 04/28/2025      Annually/Less than 8% No   Lipid profile : 04/28/2025 Annually Yes   LDL control Lab Results   Component Value Date    LDLCALC 201 (H) 04/28/2025    Annually/Less than 100 mg/dl  No   Nephropathy screening Lab Results   Component Value Date    LABMICR 5.0 09/16/2014     Lab Results   Component Value Date    PROTEINUA TRACE (A) 04/17/2025    Annually Yes   Blood pressure BP Readings from Last 1 Encounters:   04/30/25 (!) 164/70    Less than 140/90 Yes   Dilated retinal exam : 02/28/2025 Annually Yes   Foot exam   : 04/22/2025 Annually Yes     REVIEW OF SYSTEMS  General: Denies weakness, fatigue, or weight changes.   Eyes: Denies double or blurred vision, eye pain, or redness.  Cardiovascular: Denies CP, palpitations, or edema.   Respiratory: Denies cough or dyspnea.   GI: Denies heartburn, n/v, or changes in bowel patterns.   Skin: Denies rash, lesions, itching, or injection site problems.  Neuro: Denies severe HAs, numbness, tingling, tremors, or vertigo.   Endocrine: Denies polyuria, polydipsia, polyphagia, heat or cold intolerance.     Vital Signs  BP (!) 164/70 (BP Location: Right arm, Patient Position: Sitting)   Pulse 92   Ht 5' 2" (1.575 m)   Wt 80.2 kg (176 lb 12.9 oz)   SpO2 98%   BMI 32.34 kg/m²     Hemoglobin A1C   Date Value Ref Range Status   04/28/2025 11.9 (H) <5.7 % Final     Comment:     For someone without known diabetes, a hemoglobin A1c  value of 6.5% or greater indicates that they may have   diabetes and this should be confirmed with a follow-up   test.     For someone with known diabetes, a value <7% indicates   that their diabetes is well controlled and a value   greater than or equal to 7% indicates suboptimal   control. A1c targets should be individualized based on   duration of diabetes, age, comorbid conditions, and   other considerations.     Currently, no consensus exists regarding use of  hemoglobin A1c for diagnosis of " diabetes for children.         04/17/2025 12.0 (H) <5.7 % Final     Comment:     For someone without known diabetes, a hemoglobin A1c  value of 6.5% or greater indicates that they may have   diabetes and this should be confirmed with a follow-up   test.     For someone with known diabetes, a value <7% indicates   that their diabetes is well controlled and a value   greater than or equal to 7% indicates suboptimal   control. A1c targets should be individualized based on   duration of diabetes, age, comorbid conditions, and   other considerations.     Currently, no consensus exists regarding use of  hemoglobin A1c for diagnosis of diabetes for children.         07/19/2024 12.4 (H) <5.7 % of total Hgb Final     Comment:     For someone without known diabetes, a hemoglobin A1c  value of 6.5% or greater indicates that they may have   diabetes and this should be confirmed with a follow-up   test.     For someone with known diabetes, a value <7% indicates   that their diabetes is well controlled and a value   greater than or equal to 7% indicates suboptimal   control. A1c targets should be individualized based on   duration of diabetes, age, comorbid conditions, and   other considerations.     Currently, no consensus exists regarding use of  hemoglobin A1c for diagnosis of diabetes for children.         This test was performed on the Roche donna c503 platform.  Effective 11/13/23, a change in test platforms from the  Abbott  to the Roche donna c503 may have shifted  HbA1c results compared to historical results.  Based on laboratory validation testing conducted at  [facility], the Roche platform relative to the Abbott  platform had an average increase in HbA1c value of  < or = 0.3%. This difference is within accepted   variability established by the National Glycohemoglobin  Standardization Program. Note that not all individuals  will have had a shift in their results and direct  comparisons between historical and  current results for  testing conducted on different platforms is not  recommended.              Chemistry        Component Value Date/Time     04/28/2025 0224    K 4.3 04/28/2025 0224     04/28/2025 0224    CO2 31 04/28/2025 0224    BUN 9 04/28/2025 0224    CREATININE 0.67 04/28/2025 0224     (H) 04/28/2025 0224        Component Value Date/Time    CALCIUM 9.6 04/28/2025 0224    ALKPHOS 155 (H) 03/21/2023 1031    ALKPHOS 127 06/19/2020 0747    AST 13 04/28/2025 0224    ALT 13 04/28/2025 0224    BILITOT 0.5 04/28/2025 0224    ESTGFRAFRICA 113 05/20/2022 1534    EGFRNONAA 97 05/20/2022 1534           Lab Results   Component Value Date    TSH 2.47 10/13/2021      Lab Results   Component Value Date    CHOL 304 (H) 04/28/2025    CHOL 285 (H) 04/17/2025    CHOL 270 (H) 07/19/2024     Lab Results   Component Value Date    HDL 71 04/28/2025    HDL 71 04/17/2025    HDL 58 07/19/2024     Lab Results   Component Value Date    LDLCALC 201 (H) 04/28/2025    LDLCALC 183 (H) 04/17/2025    LDLCALC 179 (H) 07/19/2024     Lab Results   Component Value Date    TRIG 158 (H) 04/28/2025    TRIG 164 (H) 04/17/2025    TRIG 178 (H) 07/19/2024     Lab Results   Component Value Date    CHOLHDL 4.3 04/28/2025    CHOLHDL 4.0 04/17/2025    CHOLHDL 4.7 07/19/2024         PHYSICAL EXAMINATION  Constitutional: Appears well, no distress.  Eyes: Sclera white, PERRLA, EOMs intact.  Neck: Supple, trachea midline.   Respiratory: No cough, SOB, nor NASSAR.  Cardiovascular: RRR; no edema.  Skin: Warm and dry; no rash, lesions, acanthosis nigricans, nor injection site reactions.  Neuro: AAOx4, steady gait  Psychiatric: No unusual, disruptive, or inappropriate behavior.       Assessment/Plan  I spent a total of 85 minutes on the day of the visit.This includes face to face time and non-face to face time preparing to see the patient (eg, review of tests), obtaining and/or reviewing separately obtained history, documenting clinical information  in the electronic or other health record, independently interpreting results and communicating results to the patient/family/caregiver, or care coordinator.      1. Type 2 diabetes mellitus with hyperglycemia, without long-term current use of insulin  -     Ambulatory referral/consult to Internal Medicine  -     POCT Glucose, Hand-Held Device  -     semaglutide (OZEMPIC) 0.25 mg or 0.5 mg (2 mg/3 mL) pen injector; Inject 0.5 mg into the skin every 7 days.  Dispense: 3 mL; Refill: 3  -     blood-glucose sensor (DEXCOM G7 SENSOR) Dora; 1 Device by Misc.(Non-Drug; Combo Route) route every 10 days.  Dispense: 3 each; Refill: 11  -     insulin glargine U-100, Lantus, (LANTUS SOLOSTAR U-100 INSULIN) 100 unit/mL (3 mL) InPn pen; Inject 10 Units into the skin every evening.  Dispense: 6 mL; Refill: 3    -Reviewed last A1c. 11.9%  -Discussed A1c goal <7%.  -Discussed BG goals, provided copy.  -Continue POC. Advised to bring BG log or glucometer to every visit.  -Start CGM monitoring.  -Discussed importance of making lifestyle changes.  -Provided brochures on meal planning.  -Advised to start an exercise regimen.   -Stop Rybelsus.  -Cont. Farxiga 10mg daily.  -Continue Metformin XR 750mg BID wm.  -Start Ozempic 0.5mg weekly. Discussed MOA, SE, and dosage.  -Discussed how Ozempic lowers the risk of major cardiovascular events.  Also discussed how Ozempic lowest risk of worsening kidney disease, kidney failure ( end-stage kidney disease), and death due to cardiovascular disease.  -Discussed GLP-1 diet, copy provided on AVS.   -Discussed how to treat hypoglycemia if experienced.  -Discussed goal to wean insulin to a minimal dose w/o experiencing hypo or hyperglycemia.        2. Mixed hyperlipidemia  -Reviewed Lipid panel 304/158/71/201  -Discussed LDL goal <70.  -Discussed lifestyle changes.  -Continue atorvastatin 80 mg.  -Discussed importance of lipid control in setting of diabetes  -Discussed the importance of taking statin  every night and lifestyle changes to slow the risk of a cardiovascular event such as CVA or MI.    -Advised to start taking Atorvastatin at bedtime.      3. Essential hypertension  -Reviewed BP. 164/70  -Discussed BP goal 130/80 to prevent a cardiovascular event such as CVA or MI.  -Advised to monitor BP at home.  -Cont. Amlodipine, carvedilol, chlorthalidone, and spironolactone.           FOLLOW UP  Follow up in about 9 days (around 5/9/2025).

## 2025-05-08 ENCOUNTER — TELEPHONE (OUTPATIENT)
Dept: INTERNAL MEDICINE | Facility: CLINIC | Age: 60
End: 2025-05-08
Payer: COMMERCIAL

## 2025-05-08 NOTE — TELEPHONE ENCOUNTER
I called patient after nicole patel chatted me  [facility] labs needed for October visit with dr moss.    I had entered those lab orders on 4/24  And she went to lab and had them done   4/28  And she just did labs on 4/17/25.      I called and explained those labs were for next visit  In October.    I have entered more lab orders  And this time future dated them so can doesnt    need to go till right before she sees dr green

## 2025-05-09 ENCOUNTER — TELEPHONE (OUTPATIENT)
Dept: INTERNAL MEDICINE | Facility: CLINIC | Age: 60
End: 2025-05-09
Payer: COMMERCIAL
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
## 2025-05-16 ENCOUNTER — OFFICE VISIT (OUTPATIENT)
Dept: INTERNAL MEDICINE | Facility: CLINIC | Age: 60
End: 2025-05-16
Payer: COMMERCIAL

## 2025-05-16 DIAGNOSIS — E11.65 TYPE 2 DIABETES MELLITUS WITH HYPERGLYCEMIA, WITHOUT LONG-TERM CURRENT USE OF INSULIN: Primary | ICD-10-CM

## 2025-05-16 DIAGNOSIS — Z97.8 USES SELF-APPLIED CONTINUOUS GLUCOSE MONITORING DEVICE: ICD-10-CM

## 2025-05-16 DIAGNOSIS — E78.2 MIXED HYPERLIPIDEMIA: ICD-10-CM

## 2025-05-16 RX ORDER — INSULIN GLARGINE 100 [IU]/ML
15 INJECTION, SOLUTION SUBCUTANEOUS NIGHTLY
Start: 2025-05-16 | End: 2026-05-16

## 2025-05-16 NOTE — PROGRESS NOTES
The patient location is: home  The chief complaint leading to consultation is: T2DM    Visit type: audiovisual    Face to Face time with patient: 11  30 minutes of total time spent on the encounter, which includes face to face time and non-face to face time preparing to see the patient (eg, review of tests), Obtaining and/or reviewing separately obtained history, Documenting clinical information in the electronic or other health record, Independently interpreting results (not separately reported) and communicating results to the patient/family/caregiver, or Care coordination (not separately reported).         Each patient to whom he or she provides medical services by telemedicine is:  (1) informed of the relationship between the physician and patient and the respective role of any other health care provider with respect to management of the patient; and (2) notified that he or she may decline to receive medical services by telemedicine and may withdraw from such care at any time.    Notes:      CHIEF COMPLAINT: Type 2 Diabetes    Referred by PCP: Dr. Najera  Previously managed by: PCP  CDE person of contact (if needed): Ene Montiel       HPI: Mrs. Sharron Redmond is a 59 y.o. female who was diagnosed with Type 2 DM in 2007.     Last A1c: 11.9%    Hx of hospitalization for DM? No   Hx of HTN? yes  Hx of CKD? No   Hx of CHF? No   Hx of CVA? No   Hx of MI? No   Hx of Pancreatitis? No   Hx of DM neuropathy? No   Hx DR? No   Hx of Gastroparesis? No       VISIT SUMMARY:  Labs reviewed: GFR > 60; Lipid 304/158/71/201;   F/u today for CGM readings.   BG has improved some after starting Ozempic 0.5mg  BG av 256      BG monitoring:   Name: Dexcom G7   BG av 256      Lifestyle:   Diet: 2 meals daily  Exercise: no regular exercise      Previous Diabetes Meds Tried:  none        Current Diabetic Meds:   Ozempic 0.5 mg weekly  Farxiga 10mg daily  Metformin XR 750mg BID wm  Lantus 10 units at bedtime.      Diabetes Management  Status:  Statin: Taking  ACE/ARB: Not taking      Foot exam due: 4/22/2026  Eye exam due: 2/28/2026      Screening or Prevention Patient's value Goal Complete/Controlled?   HgA1C Testing and Control   Lab Results   Component Value Date    HGBA1C 11.9 (H) 04/28/2025      Annually/Less than 8% No   Lipid profile : 04/28/2025 Annually Yes   LDL control Lab Results   Component Value Date    LDLCALC 201 (H) 04/28/2025    Annually/Less than 100 mg/dl  No   Nephropathy screening Lab Results   Component Value Date    LABMICR 5.0 09/16/2014     Lab Results   Component Value Date    PROTEINUA TRACE (A) 04/17/2025    Annually Yes   Blood pressure BP Readings from Last 1 Encounters:   04/30/25 (!) 164/70    Less than 140/90 Yes   Dilated retinal exam : 02/28/2025 Annually Yes   Foot exam   : 04/22/2025 Annually Yes     REVIEW OF SYSTEMS  General: Denies weakness, fatigue, or weight changes.   Eyes: Denies double or blurred vision, eye pain, or redness.  Cardiovascular: Denies CP, palpitations, or edema.   Respiratory: Denies cough or dyspnea.   GI: Denies heartburn, n/v, or changes in bowel patterns.   Skin: Denies rash, lesions, itching, or injection site problems.  Neuro: Denies severe HAs, numbness, tingling, tremors, or vertigo.   Endocrine: Denies polyuria, polydipsia, polyphagia, heat or cold intolerance.     Vital Signs  There were no vitals taken for this visit.    Hemoglobin A1C   Date Value Ref Range Status   04/28/2025 11.9 (H) <5.7 % Final     Comment:     For someone without known diabetes, a hemoglobin A1c  value of 6.5% or greater indicates that they may have   diabetes and this should be confirmed with a follow-up   test.     For someone with known diabetes, a value <7% indicates   that their diabetes is well controlled and a value   greater than or equal to 7% indicates suboptimal   control. A1c targets should be individualized based on   duration of diabetes, age, comorbid conditions, and   other  considerations.     Currently, no consensus exists regarding use of  hemoglobin A1c for diagnosis of diabetes for children.         04/17/2025 12.0 (H) <5.7 % Final     Comment:     For someone without known diabetes, a hemoglobin A1c  value of 6.5% or greater indicates that they may have   diabetes and this should be confirmed with a follow-up   test.     For someone with known diabetes, a value <7% indicates   that their diabetes is well controlled and a value   greater than or equal to 7% indicates suboptimal   control. A1c targets should be individualized based on   duration of diabetes, age, comorbid conditions, and   other considerations.     Currently, no consensus exists regarding use of  hemoglobin A1c for diagnosis of diabetes for children.         07/19/2024 12.4 (H) <5.7 % of total Hgb Final     Comment:     For someone without known diabetes, a hemoglobin A1c  value of 6.5% or greater indicates that they may have   diabetes and this should be confirmed with a follow-up   test.     For someone with known diabetes, a value <7% indicates   that their diabetes is well controlled and a value   greater than or equal to 7% indicates suboptimal   control. A1c targets should be individualized based on   duration of diabetes, age, comorbid conditions, and   other considerations.     Currently, no consensus exists regarding use of  hemoglobin A1c for diagnosis of diabetes for children.         This test was performed on the Roche donna c503 platform.  Effective 11/13/23, a change in test platforms from the  Abbott  to the Roche donna c503 may have shifted  HbA1c results compared to historical results.  Based on laboratory validation testing conducted at  [facility], the Roche platform relative to the Abbott  platform had an average increase in HbA1c value of  < or = 0.3%. This difference is within accepted   variability established by the National Glycohemoglobin  Standardization Program. Note that not all  individuals  will have had a shift in their results and direct  comparisons between historical and current results for  testing conducted on different platforms is not  recommended.              Chemistry        Component Value Date/Time     04/28/2025 0224    K 4.3 04/28/2025 0224     04/28/2025 0224    CO2 31 04/28/2025 0224    BUN 9 04/28/2025 0224    CREATININE 0.67 04/28/2025 0224     (H) 04/28/2025 0224        Component Value Date/Time    CALCIUM 9.6 04/28/2025 0224    ALKPHOS 155 (H) 03/21/2023 1031    ALKPHOS 127 06/19/2020 0747    AST 13 04/28/2025 0224    ALT 13 04/28/2025 0224    BILITOT 0.5 04/28/2025 0224    ESTGFRAFRICA 113 05/20/2022 1534    EGFRNONAA 97 05/20/2022 1534           Lab Results   Component Value Date    TSH 2.47 10/13/2021      Lab Results   Component Value Date    CHOL 304 (H) 04/28/2025    CHOL 285 (H) 04/17/2025    CHOL 270 (H) 07/19/2024     Lab Results   Component Value Date    HDL 71 04/28/2025    HDL 71 04/17/2025    HDL 58 07/19/2024     Lab Results   Component Value Date    LDLCALC 201 (H) 04/28/2025    LDLCALC 183 (H) 04/17/2025    LDLCALC 179 (H) 07/19/2024     Lab Results   Component Value Date    TRIG 158 (H) 04/28/2025    TRIG 164 (H) 04/17/2025    TRIG 178 (H) 07/19/2024     Lab Results   Component Value Date    CHOLHDL 4.3 04/28/2025    CHOLHDL 4.0 04/17/2025    CHOLHDL 4.7 07/19/2024         PHYSICAL EXAMINATION  Constitutional: Appears well, no distress.  Eyes: Sclera white, PERRLA, EOMs intact.  Neck: Supple, trachea midline.   Respiratory: No cough, SOB, nor NASSAR.  Cardiovascular: RRR; no edema.  Skin: Warm and dry; no rash, lesions, acanthosis nigricans, nor injection site reactions.  Neuro: AAOx4, steady gait  Psychiatric: No unusual, disruptive, or inappropriate behavior.       Assessment/Plan    1. Type 2 diabetes mellitus with hyperglycemia, without long-term current use of insulin  Assessment & Plan:  -Reviewed last A1c. 11.9%  -Discussed A1c  goal 7-8%.  -Discussed BG goals, provided copy.  -Continue POC  1-2xs daily. Advised to bring BG log or glucometer to every visit.  -Cont  Dexcom G7 CGM monitoring.  -Discussed importance of making lifestyle changes.  -Provided brochures on meal planning.  -Advised to start an exercise regimen.   .-Cont. Farxiga 10mg daily.  -Continue Metformin XR 750mg BID wm.  -Cont Ozempic 0.5mg weekly.   -Increase Lantus to 15 units at bedtime.  -Discussed GLP-1 diet, copy provided on AVS.   -Discussed how to treat hypoglycemia if experienced.  -Discussed goal to wean insulin to a minimal dose w/o experiencing hypo or hyperglycemia.     Orders:  -     insulin glargine U-100, Lantus, (LANTUS SOLOSTAR U-100 INSULIN) 100 unit/mL (3 mL) InPn pen; Inject 15 Units into the skin every evening.    2. Mixed hyperlipidemia  Assessment & Plan:  -Reviewed Lipid panel 304/158/71/201  -Discussed LDL goal <70.  -Discussed lifestyle changes.  -Continue atorvastatin 80 mg.  -Discussed importance of lipid control in setting of diabetes  -Discussed the importance of taking statin every night and lifestyle changes to slow the risk of a cardiovascular event such as CVA or MI.          3. Uses self-applied continuous glucose monitoring device  Assessment & Plan:  -Dexcom G7  readings reviewed with patient.  -Interpretation:   -Diabetes poorly controlled; BG below  target range goal; FBG not at goal;  PPG excursions present.   -Medication regimen adjusted according to current findings.   -See glucose readings below.  -Re-eval upon next visit.            FOLLOW UP  Follow up in about 6 weeks (around 6/27/2025) for CGM readings.

## 2025-05-16 NOTE — ASSESSMENT & PLAN NOTE
-Reviewed Lipid panel 304/158/71/201  -Discussed LDL goal <70.  -Discussed lifestyle changes.  -Continue atorvastatin 80 mg.  -Discussed importance of lipid control in setting of diabetes  -Discussed the importance of taking statin every night and lifestyle changes to slow the risk of a cardiovascular event such as CVA or MI.

## 2025-05-16 NOTE — ASSESSMENT & PLAN NOTE
-Reviewed last A1c. 11.9%  -Discussed A1c goal 7-8%.  -Discussed BG goals, provided copy.  -Continue POC  1-2xs daily. Advised to bring BG log or glucometer to every visit.  -Cont  Dexcom G7 CGM monitoring.  -Discussed importance of making lifestyle changes.  -Provided brochures on meal planning.  -Advised to start an exercise regimen.   .-Cont. Farxiga 10mg daily.  -Continue Metformin XR 750mg BID wm.  -Cont Ozempic 0.5mg weekly.   -Increase Lantus to 15 units at bedtime.  -Discussed GLP-1 diet, copy provided on AVS.   -Discussed how to treat hypoglycemia if experienced.  -Discussed goal to wean insulin to a minimal dose w/o experiencing hypo or hyperglycemia.

## 2025-05-16 NOTE — ASSESSMENT & PLAN NOTE
-Dexcom G7  readings reviewed with patient.  -Interpretation:   -Diabetes poorly controlled; BG below  target range goal; FBG not at goal;  PPG excursions present.   -Medication regimen adjusted according to current findings.   -See glucose readings below.  -Re-eval upon next visit.

## 2025-05-16 NOTE — PATIENT INSTRUCTIONS
Continue monitoring your blood sugars using your Dexcom G7         Blood glucose goals:   Fasting blood glucose goal:  mg/dL.  Premeal blood glucose goal:  mg/dL.  Postmeal blood glucose goal ( 2 hrs): <180 mg/dL.  Bedtime / overnight blood glucose goal:  mg/dL.         Continue Farxiga 10mg daily.        Continue Metformin XR 750mg BID wm        Increase Lantus to 15 units at bedtime.         Continue Ozempic 0.5mg weekly.   *Be sure to take your injection on the same day every week. You can take your injection with or without food.   *Please notify the office if you start to experience severe nausea, vomiting, stomach or back pain after taking this medication.          When taking Ozempic, you should limit foods that can increase the risk of side effects like nausea, vomiting, diarrhea, and stomach pain. These foods include:   High-fat foods: Fried foods, burgers, pizza, doughnuts, and other greasy foods   Sugary foods and drinks: Soda, juice, cakes, cookies, and other sweetened beverages   Refined carbohydrates: White bread, crackers, white flour, and white rice   Processed foods: Chips, pastries, and other ultra-processed foods   Spicy foods: Hot sauce, salsa, hot peppers, and other spicy foods   High glycemic index foods: Potatoes, cereal, pretzels, sports drinks, and other foods that raise blood sugar quickly   Acidic foods: Tomato sauce, citrus fruits, and coffee   Foods that contain caffeine: Coffee, carbonated beverages, and other caffeinated drinks   You should also limit high-fiber foods if you experience diarrhea or abdominal pain.          If your blood sugar is low, follow the 15-15 rule: Have 15 grams of carbs, then wait 15 minutes. If the blood glucose level is less than 70, treat with 15 g of fast-acting carbohydrate, such as 4 oz of fruit juice or three or four glucose tablets; and  recheck the blood glucose level after 15 minutes to ensure that it has normalized.  Check your  blood sugar again. If it's still less than 70 mg/dL, repeat this process.          Refer to the food brochures when planning meals.           Start exercising for 30 minutes at least 3 times / week as tolerates. Be sure to mix cardio with strength training.            Follow up in 6 weeks

## 2025-06-06 ENCOUNTER — TELEPHONE (OUTPATIENT)
Dept: INTERNAL MEDICINE | Facility: CLINIC | Age: 60
End: 2025-06-06
Payer: COMMERCIAL

## 2025-06-06 DIAGNOSIS — E11.65 TYPE 2 DIABETES MELLITUS WITH HYPERGLYCEMIA, WITHOUT LONG-TERM CURRENT USE OF INSULIN: ICD-10-CM

## 2025-06-06 RX ORDER — BLOOD-GLUCOSE SENSOR
1 EACH MISCELLANEOUS
Qty: 9 EACH | Refills: 4 | Status: SHIPPED | OUTPATIENT
Start: 2025-06-06

## 2025-06-17 ENCOUNTER — TELEPHONE (OUTPATIENT)
Dept: INTERNAL MEDICINE | Facility: CLINIC | Age: 60
End: 2025-06-17
Payer: COMMERCIAL

## 2025-06-17 NOTE — TELEPHONE ENCOUNTER
Patient call returned regarding dexcom sensors. Patient stated he had a co-pay for 30 day supply for $140 , 90 day supply for $170. Patient stated she can't afford it and will stick her finger and keep a log. Advised to ask insurance company about sending through DME, patient voiced understanding.  Appointment on 06/27/2025.  Copied from CRM #[id number]. Topic: General Inquiry - Patient Advice  >> Jun 17, 2025 11:15 AM Jena wrote:  .1MEDICALADVICE     Patient is calling for Medical Advice regarding:pt is asking for a callback its about a prescription Dexcom 7 she is having issues with this.  Please call her back and advise.    How long has patient had these symptoms:    Pharmacy name and phone#:    Patient wants a call back or thru [facility], provide patient's call back phone number:[phone]     Comments:    Please advise patient replies from provider may take up to 48 hours.

## 2025-06-23 ENCOUNTER — PATIENT MESSAGE (OUTPATIENT)
Dept: ADMINISTRATIVE | Facility: HOSPITAL | Age: 60
End: 2025-06-23
Payer: COMMERCIAL

## 2025-06-23 ENCOUNTER — TELEPHONE (OUTPATIENT)
Dept: INTERNAL MEDICINE | Facility: CLINIC | Age: 60
End: 2025-06-23
Payer: COMMERCIAL

## 2025-06-23 NOTE — TELEPHONE ENCOUNTER
Copied from CRM #[id number]. Topic: General Inquiry - Patient Advice  >> Jun 23, 2025  3:11 PM Claudia wrote:  .1MEDICALADVICE     Patient is calling for Medical Advice regarding: Pt is calling in regards to needing a letter stating that she has to monitor her sugar and BP. 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 Please call and advise. Thank you    How long has patient had these symptoms:N/A    Pharmacy name and phone#:N/A    Patient wants a call back or thru [facility], provide patient's call back phone number:[phone] Patient    Comments:    Please advise patient replies from provider may take up to 48 hours.

## 2025-06-23 NOTE — TELEPHONE ENCOUNTER
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  Letter needs to say that she needs her phone at work  In case she needs to monitor blood pressure and blood sugar while working.

## 2025-06-24 NOTE — TELEPHONE ENCOUNTER
The patient needs to keep her cell phone with her at all times since it functions as a  for her continuous glucose monitor which constantly monitors her blood sugar levels.  Her continuous glucose sensor is worn on her body to detect high and low blood sugar extremes; this allows immediate adjustments in therapy to stabilize her diabetes.  Her cell phone records and displays her blood sugar levels.

## 2025-06-24 NOTE — TELEPHONE ENCOUNTER
Letter released on portal to patient.  I also called her & left msg advising her of this  And said to call me if needs me to fax copy  Of letter to supervisor at work.    I will need supervisor name and fax number if  She wants me to fax it somewhere.

## 2025-06-24 NOTE — TELEPHONE ENCOUNTER
Call put thru to me.    She needs letter faxed to  Sgt nicol butler  fax number     I told her faxing today.

## 2025-06-25 VITALS — SYSTOLIC BLOOD PRESSURE: 132 MMHG | DIASTOLIC BLOOD PRESSURE: 84 MMHG

## 2025-06-25 NOTE — TELEPHONE ENCOUNTER
Fax did not go thru.  I called her.     Told her it is a phone line that just keeps ringing.  No voicemail.    Now wants me to email her at [email or web address]

## 2025-06-27 ENCOUNTER — OFFICE VISIT (OUTPATIENT)
Dept: INTERNAL MEDICINE | Facility: CLINIC | Age: 60
End: 2025-06-27
Payer: COMMERCIAL

## 2025-06-27 VITALS
WEIGHT: 166.44 LBS | DIASTOLIC BLOOD PRESSURE: 76 MMHG | OXYGEN SATURATION: 97 % | HEART RATE: 85 BPM | HEIGHT: 62 IN | SYSTOLIC BLOOD PRESSURE: 160 MMHG | BODY MASS INDEX: 30.63 KG/M2

## 2025-06-27 DIAGNOSIS — Z97.8 USES SELF-APPLIED CONTINUOUS GLUCOSE MONITORING DEVICE: ICD-10-CM

## 2025-06-27 DIAGNOSIS — E11.65 TYPE 2 DIABETES MELLITUS WITH HYPERGLYCEMIA, WITHOUT LONG-TERM CURRENT USE OF INSULIN: Primary | ICD-10-CM

## 2025-06-27 DIAGNOSIS — E78.2 MIXED HYPERLIPIDEMIA: ICD-10-CM

## 2025-06-27 LAB — GLUCOSE SERPL-MCNC: 128 MG/DL (ref 70–110)

## 2025-06-27 PROCEDURE — 99999 PR PBB SHADOW E&M-EST. PATIENT-LVL V: CPT | Mod: PBBFAC,,,

## 2025-06-27 NOTE — TELEPHONE ENCOUNTER
I returned her call.  Letter we did , needs to be changed,     They wont approve the phone but she showed  Her supervisor her wrist cuff and freestyle meter  Device and they will ok that.     So she needs new Letter & needs to say   Name of machine :  wrist blood cuff & freestyle  Meter   How often uses: ev 3 hours as needed.  And say how long:  Indefinitely.  Needs to be to sgt valerie butler .    Ok I release another letter on portal with the changes?  Let me know if you want to word it or I can.    Thanks ashley

## 2025-06-27 NOTE — PROGRESS NOTES
CHIEF COMPLAINT: Type 2 Diabetes    Referred by PCP: Dr. Najera  Previously managed by: PCP  CDE person of contact (if needed): Ene Montiel       HPI: Mrs. Sharron Redmond is a 59 y.o. female who was diagnosed with Type 2 DM in 2007.     Last A1c: 11.9%    Hx of hospitalization for DM? No   Hx of HTN? yes  Hx of CKD? No   Hx of CHF? No   Hx of CVA? No   Hx of MI? No   Hx of Pancreatitis? No   Hx of DM neuropathy? No   Hx DR? No   Hx of Gastroparesis? No       VISIT SUMMARY:  Labs reviewed: A1c 11.9%; UACR ?; GFR > 60; Lipid 304/158/71/201;   F/u today for CGM and BG readings.   Reports employer no longer allows her to have her cell phone in possession, she had to terminate Dexcom.  BG has improved after starting Ozempic 0.5mg  Dexcom av improved from 256 to 221  POCT collected during visit 128.  Weight down 10 lb since LOV.  Currently keeping a diary of glucose, BP, and food.  Log attached to visit.  BG range: 119-176  Labs ordered by PCP. Will complete labs before seeing Dr. Najera in October 2025      BG monitoring:   Name: Dexcom G7 / Glucometer  BG av 221      Lifestyle:   Diet: 2 meals daily  Exercise: no regular exercise      Previous Diabetes Meds Tried:  none        Current Diabetic Meds:   Ozempic 0.5 mg weekly  Farxiga 10mg daily  Metformin XR 750mg BID wm  Lantus 10 units at bedtime.      Diabetes Management Status:  Statin: Taking  ACE/ARB: Not taking      Foot exam due: 4/22/2026  Eye exam due: 2/28/2026      Screening or Prevention Patient's value Goal Complete/Controlled?   HgA1C Testing and Control   Lab Results   Component Value Date    HGBA1C 11.9 (H) 04/28/2025      Annually/Less than 8% No   Lipid profile : 04/28/2025 Annually Yes   LDL control Lab Results   Component Value Date    LDLCALC 201 (H) 04/28/2025    Annually/Less than 100 mg/dl  No   Nephropathy screening Lab Results   Component Value Date    LABMICR 5.0 09/16/2014     Lab Results   Component Value Date    PROTEINUA TRACE (A) 04/17/2025  "   Annually Yes   Blood pressure BP Readings from Last 1 Encounters:   06/27/25 (!) 160/76    Less than 140/90 Yes   Dilated retinal exam : 02/28/2025 Annually Yes   Foot exam   : 04/22/2025 Annually Yes     REVIEW OF SYSTEMS  General: Denies weakness, fatigue, or weight changes.   Eyes: Denies double or blurred vision, eye pain, or redness.  Cardiovascular: Denies CP, palpitations, or edema.   Respiratory: Denies cough or dyspnea.   GI: Denies heartburn, n/v, or changes in bowel patterns.   Skin: Denies rash, lesions, itching, or injection site problems.  Neuro: Denies severe HAs, numbness, tingling, tremors, or vertigo.   Endocrine: Denies polyuria, polydipsia, polyphagia, heat or cold intolerance.     Vital Signs  BP (!) 160/76 (BP Location: Right arm, Patient Position: Sitting)   Pulse 85   Ht 5' 2" (1.575 m)   Wt 75.5 kg (166 lb 7.2 oz)   SpO2 97%   BMI 30.44 kg/m²     Hemoglobin A1C   Date Value Ref Range Status   04/28/2025 11.9 (H) <5.7 % Final     Comment:     For someone without known diabetes, a hemoglobin A1c  value of 6.5% or greater indicates that they may have   diabetes and this should be confirmed with a follow-up   test.     For someone with known diabetes, a value <7% indicates   that their diabetes is well controlled and a value   greater than or equal to 7% indicates suboptimal   control. A1c targets should be individualized based on   duration of diabetes, age, comorbid conditions, and   other considerations.     Currently, no consensus exists regarding use of  hemoglobin A1c for diagnosis of diabetes for children.         04/17/2025 12.0 (H) <5.7 % Final     Comment:     For someone without known diabetes, a hemoglobin A1c  value of 6.5% or greater indicates that they may have   diabetes and this should be confirmed with a follow-up   test.     For someone with known diabetes, a value <7% indicates   that their diabetes is well controlled and a value   greater than or equal to 7% " indicates suboptimal   control. A1c targets should be individualized based on   duration of diabetes, age, comorbid conditions, and   other considerations.     Currently, no consensus exists regarding use of  hemoglobin A1c for diagnosis of diabetes for children.         07/19/2024 12.4 (H) <5.7 % of total Hgb Final     Comment:     For someone without known diabetes, a hemoglobin A1c  value of 6.5% or greater indicates that they may have   diabetes and this should be confirmed with a follow-up   test.     For someone with known diabetes, a value <7% indicates   that their diabetes is well controlled and a value   greater than or equal to 7% indicates suboptimal   control. A1c targets should be individualized based on   duration of diabetes, age, comorbid conditions, and   other considerations.     Currently, no consensus exists regarding use of  hemoglobin A1c for diagnosis of diabetes for children.         This test was performed on the Roche donna c503 platform.  Effective 11/13/23, a change in test platforms from the  [employer]ott  to the Roche donna c503 may have shifted  HbA1c results compared to historical results.  Based on laboratory validation testing conducted at  [facility], the Roche platform relative to the Abbott  platform had an average increase in HbA1c value of  < or = 0.3%. This difference is within accepted   variability established by the National Glycohemoglobin  Standardization Program. Note that not all individuals  will have had a shift in their results and direct  comparisons between historical and current results for  testing conducted on different platforms is not  recommended.              Chemistry        Component Value Date/Time     04/28/2025 0224    K 4.3 04/28/2025 0224     04/28/2025 0224    CO2 31 04/28/2025 0224    BUN 9 04/28/2025 0224    CREATININE 0.67 04/28/2025 0224     (H) 04/28/2025 0224        Component Value Date/Time    CALCIUM 9.6 04/28/2025 0224     ALKPHOS 155 (H) 03/21/2023 1031    ALKPHOS 127 06/19/2020 0747    AST 13 04/28/2025 0224    ALT 13 04/28/2025 0224    BILITOT 0.5 04/28/2025 0224    ESTGFRAFRICA 113 05/20/2022 1534    EGFRNONAA 97 05/20/2022 1534           Lab Results   Component Value Date    TSH 2.47 10/13/2021      Lab Results   Component Value Date    CHOL 304 (H) 04/28/2025    CHOL 285 (H) 04/17/2025    CHOL 270 (H) 07/19/2024     Lab Results   Component Value Date    HDL 71 04/28/2025    HDL 71 04/17/2025    HDL 58 07/19/2024     Lab Results   Component Value Date    LDLCALC 201 (H) 04/28/2025    LDLCALC 183 (H) 04/17/2025    LDLCALC 179 (H) 07/19/2024     Lab Results   Component Value Date    TRIG 158 (H) 04/28/2025    TRIG 164 (H) 04/17/2025    TRIG 178 (H) 07/19/2024     Lab Results   Component Value Date    CHOLHDL 4.3 04/28/2025    CHOLHDL 4.0 04/17/2025    CHOLHDL 4.7 07/19/2024         PHYSICAL EXAMINATION  Constitutional: Appears well, no distress.  Eyes: Sclera white, PERRLA, EOMs intact.  Neck: Supple, trachea midline.   Respiratory: No cough, SOB, nor NASSAR.  Cardiovascular: RRR; no edema.  Skin: Warm and dry; no rash, lesions, acanthosis nigricans, nor injection site reactions.  Neuro: AAOx4, steady gait  Psychiatric: No unusual, disruptive, or inappropriate behavior.       Assessment/Plan    1. Type 2 diabetes mellitus with hyperglycemia, without long-term current use of insulin  Assessment & Plan:  -Reviewed last A1c. 11.9%  -Discussed A1c goal 7-8%.  -Discussed BG goals, provided copy.  -Continue POC  1-2xs daily. Advised to bring BG log or glucometer to every visit.              -Discussed importance of making lifestyle changes.  -Provided brochures on meal planning.  -Advised to start an exercise regimen.   .-Cont. Farxiga 10mg daily.  -Continue Metformin XR 750mg BID wm.  -Cont Ozempic 0.5mg weekly.   -Cont Lantus 15 units at bedtime.  -Discussed GLP-1 diet, copy provided on AVS.   -Discussed how to treat hypoglycemia if  experienced.  -Discussed goal to wean insulin to a minimal dose w/o experiencing hypo or hyperglycemia.     Orders:  -    POCT    2. Mixed hyperlipidemia  Assessment & Plan:  -Reviewed Lipid panel 304/158/71/201  -Discussed LDL goal <70.  -Discussed lifestyle changes.  -Continue atorvastatin 80 mg.  -Discussed importance of lipid control in setting of diabetes  -Discussed the importance of taking statin every night and lifestyle changes to slow the risk of a cardiovascular event such as CVA or MI.          3. Uses self-applied continuous glucose monitoring device  Assessment & Plan:  -Dexcom G7  readings reviewed with patient.  -Interpretation:   -Diabetes better controlled; BG below  target range goal; FBG not at goal;  PPG excursions present.   -Medication regimen adjusted according to current findings.   -See glucose readings below.  -Re-eval upon next visit.       FOLLOW UP  Follow up in 3 months (on 9/22/2025) for BG readings.

## 2025-06-27 NOTE — PATIENT INSTRUCTIONS
Continue monitoring your blood sugars using your Dexcom G7         Blood glucose goals:   Fasting blood glucose goal:  mg/dL.  Premeal blood glucose goal:  mg/dL.  Postmeal blood glucose goal ( 2 hrs): <180 mg/dL.  Bedtime / overnight blood glucose goal:  mg/dL.         Continue Farxiga 10mg daily.        Continue Metformin XR 750mg BID wm        Continue Lantus to 15 units at bedtime.         Continue Ozempic 0.5mg weekly.   *Be sure to take your injection on the same day every week. You can take your injection with or without food.   *Please notify the office if you start to experience severe nausea, vomiting, stomach or back pain after taking this medication.          When taking Ozempic, you should limit foods that can increase the risk of side effects like nausea, vomiting, diarrhea, and stomach pain. These foods include:   High-fat foods: Fried foods, burgers, pizza, doughnuts, and other greasy foods   Sugary foods and drinks: Soda, juice, cakes, cookies, and other sweetened beverages   Refined carbohydrates: White bread, crackers, white flour, and white rice   Processed foods: Chips, pastries, and other ultra-processed foods   Spicy foods: Hot sauce, salsa, hot peppers, and other spicy foods   High glycemic index foods: Potatoes, cereal, pretzels, sports drinks, and other foods that raise blood sugar quickly   Acidic foods: Tomato sauce, citrus fruits, and coffee   Foods that contain caffeine: Coffee, carbonated beverages, and other caffeinated drinks   You should also limit high-fiber foods if you experience diarrhea or abdominal pain.          If your blood sugar is low, follow the 15-15 rule: Have 15 grams of carbs, then wait 15 minutes. If the blood glucose level is less than 70, treat with 15 g of fast-acting carbohydrate, such as 4 oz of fruit juice or three or four glucose tablets; and  recheck the blood glucose level after 15 minutes to ensure that it has normalized.  Check your  blood sugar again. If it's still less than 70 mg/dL, repeat this process.          Refer to the food brochures when planning meals.           Start exercising for 30 minutes at least 3 times / week as tolerates. Be sure to mix cardio with strength training.            Follow up on 9/22/2025 @ 3 pm.